# Patient Record
Sex: MALE | Race: WHITE | Employment: OTHER | URBAN - METROPOLITAN AREA
[De-identification: names, ages, dates, MRNs, and addresses within clinical notes are randomized per-mention and may not be internally consistent; named-entity substitution may affect disease eponyms.]

---

## 2017-03-08 ENCOUNTER — TELEPHONE (OUTPATIENT)
Dept: SURGERY | Age: 72
End: 2017-03-08

## 2017-03-13 ENCOUNTER — PAT TELEPHONE (OUTPATIENT)
Dept: PREADMISSION TESTING | Age: 72
End: 2017-03-13

## 2017-03-13 VITALS — HEIGHT: 73 IN | WEIGHT: 225 LBS | BODY MASS INDEX: 29.82 KG/M2

## 2017-03-15 ENCOUNTER — HOSPITAL ENCOUNTER (OUTPATIENT)
Dept: SURGERY | Age: 72
Discharge: HOME | End: 2017-03-15
Attending: SURGERY | Admitting: SURGERY

## 2017-03-15 VITALS
BODY MASS INDEX: 30.58 KG/M2 | OXYGEN SATURATION: 96 % | RESPIRATION RATE: 20 BRPM | TEMPERATURE: 97.6 F | DIASTOLIC BLOOD PRESSURE: 99 MMHG | HEART RATE: 71 BPM | HEIGHT: 73 IN | SYSTOLIC BLOOD PRESSURE: 154 MMHG | WEIGHT: 230.71 LBS

## 2017-03-15 LAB
ANION GAP SERPL CALCULATED.3IONS-SCNC: 12 MMOL/L (ref 3–16)
BASOPHILS ABSOLUTE: 0 K/UL (ref 0–0.2)
BASOPHILS RELATIVE PERCENT: 0.5 %
BUN BLDV-MCNC: 11 MG/DL (ref 7–20)
CALCIUM SERPL-MCNC: 9.7 MG/DL (ref 8.3–10.6)
CHLORIDE BLD-SCNC: 100 MMOL/L (ref 99–110)
CO2: 25 MMOL/L (ref 21–32)
CREAT SERPL-MCNC: 0.6 MG/DL (ref 0.8–1.3)
EOSINOPHILS ABSOLUTE: 0.2 K/UL (ref 0–0.6)
EOSINOPHILS RELATIVE PERCENT: 4 %
GFR AFRICAN AMERICAN: >60
GFR NON-AFRICAN AMERICAN: >60
GLUCOSE BLD-MCNC: 102 MG/DL (ref 70–99)
HCT VFR BLD CALC: 48 % (ref 40.5–52.5)
HEMOGLOBIN: 16.3 G/DL (ref 13.5–17.5)
LYMPHOCYTES ABSOLUTE: 1.2 K/UL (ref 1–5.1)
LYMPHOCYTES RELATIVE PERCENT: 22.6 %
MCH RBC QN AUTO: 33.5 PG (ref 26–34)
MCHC RBC AUTO-ENTMCNC: 33.9 G/DL (ref 31–36)
MCV RBC AUTO: 99 FL (ref 80–100)
MONOCYTES ABSOLUTE: 0.6 K/UL (ref 0–1.3)
MONOCYTES RELATIVE PERCENT: 10.5 %
NEUTROPHILS ABSOLUTE: 3.4 K/UL (ref 1.7–7.7)
NEUTROPHILS RELATIVE PERCENT: 62.4 %
PDW BLD-RTO: 14 % (ref 12.4–15.4)
PLATELET # BLD: 187 K/UL (ref 135–450)
PMV BLD AUTO: 8.7 FL (ref 5–10.5)
POTASSIUM SERPL-SCNC: 4.1 MMOL/L (ref 3.5–5.1)
RBC # BLD: 4.85 M/UL (ref 4.2–5.9)
SODIUM BLD-SCNC: 137 MMOL/L (ref 136–145)
WBC # BLD: 5.4 K/UL (ref 4–11)

## 2017-03-15 PROCEDURE — 49568 PR IMPLANT MESH HERNIA REPAIR/DEBRIDEMENT CLOSURE: CPT | Performed by: SURGERY

## 2017-03-15 PROCEDURE — 49560 PR REPAIR INCISIONAL HERNIA,REDUCIBLE: CPT | Performed by: SURGERY

## 2017-03-15 PROCEDURE — 93010 ELECTROCARDIOGRAM REPORT: CPT | Performed by: INTERNAL MEDICINE

## 2017-03-15 RX ORDER — OXYCODONE HYDROCHLORIDE 5 MG/1
5 TABLET ORAL EVERY 4 HOURS PRN
Qty: 30 TABLET | Refills: 0 | Status: SHIPPED | OUTPATIENT
Start: 2017-03-15 | End: 2017-03-30 | Stop reason: CLARIF

## 2017-03-15 RX ORDER — SODIUM CHLORIDE 0.9 % (FLUSH) 0.9 %
10 SYRINGE (ML) INJECTION PRN
Status: DISCONTINUED | OUTPATIENT
Start: 2017-03-15 | End: 2017-03-16 | Stop reason: HOSPADM

## 2017-03-15 RX ORDER — SODIUM CHLORIDE 9 MG/ML
INJECTION, SOLUTION INTRAVENOUS CONTINUOUS
Status: DISCONTINUED | OUTPATIENT
Start: 2017-03-15 | End: 2017-03-16 | Stop reason: HOSPADM

## 2017-03-15 RX ORDER — OXYCODONE HYDROCHLORIDE 5 MG/1
5 TABLET ORAL EVERY 4 HOURS PRN
Qty: 30 TABLET | Refills: 0 | Status: SHIPPED | OUTPATIENT
Start: 2017-03-15 | End: 2017-03-15

## 2017-03-15 RX ORDER — NAPROXEN 500 MG/1
500 TABLET ORAL 2 TIMES DAILY WITH MEALS
Qty: 30 TABLET | Refills: 0 | Status: SHIPPED | OUTPATIENT
Start: 2017-03-15 | End: 2017-03-15

## 2017-03-15 RX ORDER — OXYCODONE HYDROCHLORIDE 5 MG/1
5 TABLET ORAL
Status: COMPLETED | OUTPATIENT
Start: 2017-03-15 | End: 2017-03-15

## 2017-03-15 RX ORDER — NAPROXEN 500 MG/1
500 TABLET ORAL 2 TIMES DAILY WITH MEALS
Qty: 30 TABLET | Refills: 0 | Status: SHIPPED | OUTPATIENT
Start: 2017-03-15 | End: 2017-03-30 | Stop reason: CLARIF

## 2017-03-15 RX ORDER — SODIUM CHLORIDE 0.9 % (FLUSH) 0.9 %
10 SYRINGE (ML) INJECTION EVERY 12 HOURS SCHEDULED
Status: DISCONTINUED | OUTPATIENT
Start: 2017-03-15 | End: 2017-03-16 | Stop reason: HOSPADM

## 2017-03-15 RX ADMIN — OXYCODONE HYDROCHLORIDE 5 MG: 5 TABLET ORAL at 14:32

## 2017-03-15 ASSESSMENT — PAIN - FUNCTIONAL ASSESSMENT: PAIN_FUNCTIONAL_ASSESSMENT: 0-10

## 2017-03-15 ASSESSMENT — PAIN SCALES - GENERAL
PAINLEVEL_OUTOF10: 2
PAINLEVEL_OUTOF10: 0
PAINLEVEL_OUTOF10: 2
PAINLEVEL_OUTOF10: 3
PAINLEVEL_OUTOF10: 3

## 2017-03-15 ASSESSMENT — PAIN DESCRIPTION - PAIN TYPE
TYPE: SURGICAL PAIN

## 2017-03-15 ASSESSMENT — ENCOUNTER SYMPTOMS: SHORTNESS OF BREATH: 0

## 2017-03-16 LAB
EKG ATRIAL RATE: 78 BPM
EKG DIAGNOSIS: NORMAL
EKG P AXIS: 68 DEGREES
EKG P-R INTERVAL: 182 MS
EKG Q-T INTERVAL: 376 MS
EKG QRS DURATION: 88 MS
EKG QTC CALCULATION (BAZETT): 428 MS
EKG R AXIS: 11 DEGREES
EKG T AXIS: 59 DEGREES
EKG VENTRICULAR RATE: 78 BPM

## 2017-03-17 ENCOUNTER — TELEPHONE (OUTPATIENT)
Dept: SURGERY | Age: 72
End: 2017-03-17

## 2017-03-30 ENCOUNTER — OFFICE VISIT (OUTPATIENT)
Dept: SURGERY | Age: 72
End: 2017-03-30

## 2017-03-30 DIAGNOSIS — K43.2 INCISIONAL HERNIA, WITHOUT OBSTRUCTION OR GANGRENE: Primary | ICD-10-CM

## 2017-03-30 PROCEDURE — 99024 POSTOP FOLLOW-UP VISIT: CPT | Performed by: SURGERY

## 2017-04-10 ENCOUNTER — OFFICE VISIT (OUTPATIENT)
Dept: INTERNAL MEDICINE CLINIC | Age: 72
End: 2017-04-10

## 2017-04-10 VITALS
BODY MASS INDEX: 30.62 KG/M2 | DIASTOLIC BLOOD PRESSURE: 80 MMHG | WEIGHT: 231 LBS | HEART RATE: 85 BPM | RESPIRATION RATE: 16 BRPM | SYSTOLIC BLOOD PRESSURE: 132 MMHG | OXYGEN SATURATION: 94 % | HEIGHT: 73 IN

## 2017-04-10 DIAGNOSIS — R03.0 ELEVATED BP WITHOUT DIAGNOSIS OF HYPERTENSION: Primary | ICD-10-CM

## 2017-04-10 PROCEDURE — 99213 OFFICE O/P EST LOW 20 MIN: CPT | Performed by: NURSE PRACTITIONER

## 2017-04-10 PROCEDURE — 3017F COLORECTAL CA SCREEN DOC REV: CPT | Performed by: NURSE PRACTITIONER

## 2017-04-10 PROCEDURE — 4040F PNEUMOC VAC/ADMIN/RCVD: CPT | Performed by: NURSE PRACTITIONER

## 2017-04-10 PROCEDURE — G8427 DOCREV CUR MEDS BY ELIG CLIN: HCPCS | Performed by: NURSE PRACTITIONER

## 2017-04-10 PROCEDURE — G8417 CALC BMI ABV UP PARAM F/U: HCPCS | Performed by: NURSE PRACTITIONER

## 2017-04-10 PROCEDURE — 1036F TOBACCO NON-USER: CPT | Performed by: NURSE PRACTITIONER

## 2017-04-10 PROCEDURE — 1123F ACP DISCUSS/DSCN MKR DOCD: CPT | Performed by: NURSE PRACTITIONER

## 2017-04-10 RX ORDER — LORATADINE AND PSEUDOEPHEDRINE 10; 240 MG/1; MG/1
TABLET, EXTENDED RELEASE ORAL
Qty: 90 TABLET | Refills: 0 | OUTPATIENT
Start: 2017-04-10

## 2017-04-11 RX ORDER — LORATADINE AND PSEUDOEPHEDRINE 10; 240 MG/1; MG/1
TABLET, EXTENDED RELEASE ORAL
Qty: 90 TABLET | Refills: 0 | OUTPATIENT
Start: 2017-04-11

## 2017-04-14 RX ORDER — LORATADINE AND PSEUDOEPHEDRINE 10; 240 MG/1; MG/1
TABLET, EXTENDED RELEASE ORAL
Qty: 90 TABLET | Refills: 0 | OUTPATIENT
Start: 2017-04-14

## 2017-05-09 ENCOUNTER — OFFICE VISIT (OUTPATIENT)
Dept: FAMILY MEDICINE CLINIC | Age: 72
End: 2017-05-09

## 2017-05-09 VITALS
BODY MASS INDEX: 30.87 KG/M2 | RESPIRATION RATE: 12 BRPM | DIASTOLIC BLOOD PRESSURE: 90 MMHG | SYSTOLIC BLOOD PRESSURE: 146 MMHG | WEIGHT: 234 LBS | HEART RATE: 67 BPM

## 2017-05-09 DIAGNOSIS — I10 ESSENTIAL HYPERTENSION: ICD-10-CM

## 2017-05-09 DIAGNOSIS — I71.40 ABDOMINAL AORTIC ANEURYSM (AAA) WITHOUT RUPTURE: ICD-10-CM

## 2017-05-09 DIAGNOSIS — R73.03 PREDIABETES: ICD-10-CM

## 2017-05-09 DIAGNOSIS — E78.00 HYPERCHOLESTEREMIA: Primary | ICD-10-CM

## 2017-05-09 DIAGNOSIS — R73.9 HYPERGLYCEMIA: ICD-10-CM

## 2017-05-09 DIAGNOSIS — K21.9 GASTROESOPHAGEAL REFLUX DISEASE, ESOPHAGITIS PRESENCE NOT SPECIFIED: ICD-10-CM

## 2017-05-09 PROCEDURE — 1036F TOBACCO NON-USER: CPT | Performed by: NURSE PRACTITIONER

## 2017-05-09 PROCEDURE — 4040F PNEUMOC VAC/ADMIN/RCVD: CPT | Performed by: NURSE PRACTITIONER

## 2017-05-09 PROCEDURE — G8427 DOCREV CUR MEDS BY ELIG CLIN: HCPCS | Performed by: NURSE PRACTITIONER

## 2017-05-09 PROCEDURE — 3017F COLORECTAL CA SCREEN DOC REV: CPT | Performed by: NURSE PRACTITIONER

## 2017-05-09 PROCEDURE — G8417 CALC BMI ABV UP PARAM F/U: HCPCS | Performed by: NURSE PRACTITIONER

## 2017-05-09 PROCEDURE — 1123F ACP DISCUSS/DSCN MKR DOCD: CPT | Performed by: NURSE PRACTITIONER

## 2017-05-09 PROCEDURE — 99214 OFFICE O/P EST MOD 30 MIN: CPT | Performed by: NURSE PRACTITIONER

## 2017-05-09 RX ORDER — LISINOPRIL 10 MG/1
10 TABLET ORAL DAILY
Qty: 30 TABLET | Refills: 1 | Status: SHIPPED | OUTPATIENT
Start: 2017-05-09 | End: 2017-05-15 | Stop reason: SDUPTHER

## 2017-05-09 ASSESSMENT — ENCOUNTER SYMPTOMS
CONSTIPATION: 0
NAUSEA: 0
SHORTNESS OF BREATH: 0
DIARRHEA: 0
VOMITING: 0
CHEST TIGHTNESS: 0

## 2017-05-09 ASSESSMENT — PATIENT HEALTH QUESTIONNAIRE - PHQ9
SUM OF ALL RESPONSES TO PHQ QUESTIONS 1-9: 0
SUM OF ALL RESPONSES TO PHQ9 QUESTIONS 1 & 2: 0
1. LITTLE INTEREST OR PLEASURE IN DOING THINGS: 0
2. FEELING DOWN, DEPRESSED OR HOPELESS: 0

## 2017-05-11 DIAGNOSIS — R73.9 HYPERGLYCEMIA: ICD-10-CM

## 2017-05-11 DIAGNOSIS — E78.00 HYPERCHOLESTEREMIA: ICD-10-CM

## 2017-05-11 DIAGNOSIS — R73.03 PREDIABETES: ICD-10-CM

## 2017-05-11 LAB
A/G RATIO: 1.9 (ref 1.1–2.2)
ALBUMIN SERPL-MCNC: 4.4 G/DL (ref 3.4–5)
ALP BLD-CCNC: 73 U/L (ref 40–129)
ALT SERPL-CCNC: 21 U/L (ref 10–40)
ANION GAP SERPL CALCULATED.3IONS-SCNC: 14 MMOL/L (ref 3–16)
AST SERPL-CCNC: 22 U/L (ref 15–37)
BILIRUB SERPL-MCNC: 0.4 MG/DL (ref 0–1)
BUN BLDV-MCNC: 19 MG/DL (ref 7–20)
CALCIUM SERPL-MCNC: 9.6 MG/DL (ref 8.3–10.6)
CHLORIDE BLD-SCNC: 105 MMOL/L (ref 99–110)
CHOLESTEROL, TOTAL: 186 MG/DL (ref 0–199)
CO2: 25 MMOL/L (ref 21–32)
CREAT SERPL-MCNC: 0.8 MG/DL (ref 0.8–1.3)
GFR AFRICAN AMERICAN: >60
GFR NON-AFRICAN AMERICAN: >60
GLOBULIN: 2.3 G/DL
GLUCOSE BLD-MCNC: 97 MG/DL (ref 70–99)
HDLC SERPL-MCNC: 49 MG/DL (ref 40–60)
LDL CHOLESTEROL CALCULATED: 121 MG/DL
POTASSIUM SERPL-SCNC: 5 MMOL/L (ref 3.5–5.1)
SODIUM BLD-SCNC: 144 MMOL/L (ref 136–145)
TOTAL PROTEIN: 6.7 G/DL (ref 6.4–8.2)
TRIGL SERPL-MCNC: 80 MG/DL (ref 0–150)
VLDLC SERPL CALC-MCNC: 16 MG/DL

## 2017-05-12 LAB
ESTIMATED AVERAGE GLUCOSE: 116.9 MG/DL
HBA1C MFR BLD: 5.7 %

## 2017-05-15 DIAGNOSIS — I10 ESSENTIAL HYPERTENSION: ICD-10-CM

## 2017-05-15 RX ORDER — LISINOPRIL 10 MG/1
10 TABLET ORAL DAILY
Qty: 90 TABLET | Refills: 0 | Status: SHIPPED | OUTPATIENT
Start: 2017-05-15 | End: 2017-09-24 | Stop reason: SDUPTHER

## 2017-05-16 RX ORDER — ATORVASTATIN CALCIUM 80 MG/1
TABLET, FILM COATED ORAL
Qty: 90 TABLET | Refills: 1 | Status: SHIPPED | OUTPATIENT
Start: 2017-05-16 | End: 2017-11-09 | Stop reason: SDUPTHER

## 2017-06-06 ENCOUNTER — OFFICE VISIT (OUTPATIENT)
Dept: FAMILY MEDICINE CLINIC | Age: 72
End: 2017-06-06

## 2017-06-06 VITALS
DIASTOLIC BLOOD PRESSURE: 80 MMHG | HEART RATE: 68 BPM | WEIGHT: 229 LBS | RESPIRATION RATE: 12 BRPM | SYSTOLIC BLOOD PRESSURE: 130 MMHG | BODY MASS INDEX: 30.21 KG/M2

## 2017-06-06 DIAGNOSIS — K21.9 GASTROESOPHAGEAL REFLUX DISEASE, ESOPHAGITIS PRESENCE NOT SPECIFIED: ICD-10-CM

## 2017-06-06 DIAGNOSIS — I10 ESSENTIAL HYPERTENSION: ICD-10-CM

## 2017-06-06 DIAGNOSIS — I10 ESSENTIAL HYPERTENSION: Primary | ICD-10-CM

## 2017-06-06 LAB
ANION GAP SERPL CALCULATED.3IONS-SCNC: 12 MMOL/L (ref 3–16)
BUN BLDV-MCNC: 17 MG/DL (ref 7–20)
CALCIUM SERPL-MCNC: 9.4 MG/DL (ref 8.3–10.6)
CHLORIDE BLD-SCNC: 103 MMOL/L (ref 99–110)
CO2: 26 MMOL/L (ref 21–32)
CREAT SERPL-MCNC: 0.8 MG/DL (ref 0.8–1.3)
GFR AFRICAN AMERICAN: >60
GFR NON-AFRICAN AMERICAN: >60
GLUCOSE BLD-MCNC: 90 MG/DL (ref 70–99)
POTASSIUM SERPL-SCNC: 4.6 MMOL/L (ref 3.5–5.1)
SODIUM BLD-SCNC: 141 MMOL/L (ref 136–145)

## 2017-06-06 PROCEDURE — G8417 CALC BMI ABV UP PARAM F/U: HCPCS | Performed by: NURSE PRACTITIONER

## 2017-06-06 PROCEDURE — 99213 OFFICE O/P EST LOW 20 MIN: CPT | Performed by: NURSE PRACTITIONER

## 2017-06-06 PROCEDURE — 1036F TOBACCO NON-USER: CPT | Performed by: NURSE PRACTITIONER

## 2017-06-06 PROCEDURE — G8427 DOCREV CUR MEDS BY ELIG CLIN: HCPCS | Performed by: NURSE PRACTITIONER

## 2017-06-06 PROCEDURE — 1123F ACP DISCUSS/DSCN MKR DOCD: CPT | Performed by: NURSE PRACTITIONER

## 2017-06-06 PROCEDURE — 4040F PNEUMOC VAC/ADMIN/RCVD: CPT | Performed by: NURSE PRACTITIONER

## 2017-06-06 PROCEDURE — 3017F COLORECTAL CA SCREEN DOC REV: CPT | Performed by: NURSE PRACTITIONER

## 2017-06-06 ASSESSMENT — ENCOUNTER SYMPTOMS
SHORTNESS OF BREATH: 0
DIARRHEA: 0
VOMITING: 0
CONSTIPATION: 0
CHEST TIGHTNESS: 0
NAUSEA: 0

## 2017-08-25 ENCOUNTER — OFFICE VISIT (OUTPATIENT)
Dept: FAMILY MEDICINE CLINIC | Age: 72
End: 2017-08-25

## 2017-08-25 VITALS
SYSTOLIC BLOOD PRESSURE: 118 MMHG | HEART RATE: 71 BPM | RESPIRATION RATE: 12 BRPM | BODY MASS INDEX: 29.69 KG/M2 | DIASTOLIC BLOOD PRESSURE: 84 MMHG | WEIGHT: 225 LBS

## 2017-08-25 DIAGNOSIS — K21.9 GASTROESOPHAGEAL REFLUX DISEASE, ESOPHAGITIS PRESENCE NOT SPECIFIED: ICD-10-CM

## 2017-08-25 DIAGNOSIS — S01.112A EYEBROW LACERATION, LEFT, INITIAL ENCOUNTER: ICD-10-CM

## 2017-08-25 PROCEDURE — G8417 CALC BMI ABV UP PARAM F/U: HCPCS | Performed by: NURSE PRACTITIONER

## 2017-08-25 PROCEDURE — G8427 DOCREV CUR MEDS BY ELIG CLIN: HCPCS | Performed by: NURSE PRACTITIONER

## 2017-08-25 PROCEDURE — 1123F ACP DISCUSS/DSCN MKR DOCD: CPT | Performed by: NURSE PRACTITIONER

## 2017-08-25 PROCEDURE — 4040F PNEUMOC VAC/ADMIN/RCVD: CPT | Performed by: NURSE PRACTITIONER

## 2017-08-25 PROCEDURE — 99213 OFFICE O/P EST LOW 20 MIN: CPT | Performed by: NURSE PRACTITIONER

## 2017-08-25 PROCEDURE — 1036F TOBACCO NON-USER: CPT | Performed by: NURSE PRACTITIONER

## 2017-08-25 PROCEDURE — 3017F COLORECTAL CA SCREEN DOC REV: CPT | Performed by: NURSE PRACTITIONER

## 2017-08-25 RX ORDER — ESOMEPRAZOLE MAGNESIUM 40 MG/1
CAPSULE, DELAYED RELEASE ORAL
Qty: 90 CAPSULE | Refills: 1 | Status: SHIPPED | OUTPATIENT
Start: 2017-08-25 | End: 2018-02-09 | Stop reason: SDUPTHER

## 2017-08-26 ASSESSMENT — ENCOUNTER SYMPTOMS
SHORTNESS OF BREATH: 0
CONSTIPATION: 0
DIARRHEA: 0
CHEST TIGHTNESS: 0
NAUSEA: 0
VOMITING: 0

## 2017-08-28 RX ORDER — LORATADINE AND PSEUDOEPHEDRINE 10; 240 MG/1; MG/1
TABLET, EXTENDED RELEASE ORAL
Qty: 90 TABLET | Refills: 0 | Status: SHIPPED | OUTPATIENT
Start: 2017-08-28 | End: 2017-11-06 | Stop reason: ALTCHOICE

## 2017-09-24 DIAGNOSIS — I10 ESSENTIAL HYPERTENSION: ICD-10-CM

## 2017-09-25 RX ORDER — LISINOPRIL 10 MG/1
TABLET ORAL
Qty: 90 TABLET | Refills: 0 | Status: SHIPPED | OUTPATIENT
Start: 2017-09-25 | End: 2017-11-06 | Stop reason: ALTCHOICE

## 2017-11-06 ENCOUNTER — OFFICE VISIT (OUTPATIENT)
Dept: FAMILY MEDICINE CLINIC | Age: 72
End: 2017-11-06

## 2017-11-06 VITALS
BODY MASS INDEX: 30.62 KG/M2 | HEART RATE: 74 BPM | WEIGHT: 231 LBS | DIASTOLIC BLOOD PRESSURE: 72 MMHG | SYSTOLIC BLOOD PRESSURE: 128 MMHG | OXYGEN SATURATION: 96 % | HEIGHT: 73 IN | TEMPERATURE: 98.3 F

## 2017-11-06 DIAGNOSIS — K21.9 GASTROESOPHAGEAL REFLUX DISEASE, ESOPHAGITIS PRESENCE NOT SPECIFIED: ICD-10-CM

## 2017-11-06 DIAGNOSIS — Z23 NEED FOR INFLUENZA VACCINATION: ICD-10-CM

## 2017-11-06 DIAGNOSIS — E78.00 HYPERCHOLESTEREMIA: Primary | ICD-10-CM

## 2017-11-06 DIAGNOSIS — R73.9 HYPERGLYCEMIA: ICD-10-CM

## 2017-11-06 DIAGNOSIS — R73.03 PREDIABETES: ICD-10-CM

## 2017-11-06 DIAGNOSIS — I71.40 ABDOMINAL AORTIC ANEURYSM (AAA) WITHOUT RUPTURE: ICD-10-CM

## 2017-11-06 DIAGNOSIS — J30.89 CHRONIC NON-SEASONAL ALLERGIC RHINITIS, UNSPECIFIED TRIGGER: ICD-10-CM

## 2017-11-06 DIAGNOSIS — I10 ESSENTIAL HYPERTENSION: ICD-10-CM

## 2017-11-06 PROCEDURE — G8427 DOCREV CUR MEDS BY ELIG CLIN: HCPCS | Performed by: NURSE PRACTITIONER

## 2017-11-06 PROCEDURE — G8417 CALC BMI ABV UP PARAM F/U: HCPCS | Performed by: NURSE PRACTITIONER

## 2017-11-06 PROCEDURE — 99214 OFFICE O/P EST MOD 30 MIN: CPT | Performed by: NURSE PRACTITIONER

## 2017-11-06 PROCEDURE — 1123F ACP DISCUSS/DSCN MKR DOCD: CPT | Performed by: NURSE PRACTITIONER

## 2017-11-06 PROCEDURE — G8484 FLU IMMUNIZE NO ADMIN: HCPCS | Performed by: NURSE PRACTITIONER

## 2017-11-06 PROCEDURE — G0008 ADMIN INFLUENZA VIRUS VAC: HCPCS | Performed by: NURSE PRACTITIONER

## 2017-11-06 PROCEDURE — 3017F COLORECTAL CA SCREEN DOC REV: CPT | Performed by: NURSE PRACTITIONER

## 2017-11-06 PROCEDURE — 4040F PNEUMOC VAC/ADMIN/RCVD: CPT | Performed by: NURSE PRACTITIONER

## 2017-11-06 PROCEDURE — 1036F TOBACCO NON-USER: CPT | Performed by: NURSE PRACTITIONER

## 2017-11-06 PROCEDURE — 90662 IIV NO PRSV INCREASED AG IM: CPT | Performed by: NURSE PRACTITIONER

## 2017-11-06 RX ORDER — LOSARTAN POTASSIUM 50 MG/1
50 TABLET ORAL DAILY
Qty: 30 TABLET | Refills: 1 | Status: SHIPPED | OUTPATIENT
Start: 2017-11-06 | End: 2017-12-04 | Stop reason: SDUPTHER

## 2017-11-06 RX ORDER — CETIRIZINE HYDROCHLORIDE, PSEUDOEPHEDRINE HYDROCHLORIDE 5; 120 MG/1; MG/1
1 TABLET, FILM COATED, EXTENDED RELEASE ORAL 2 TIMES DAILY
COMMUNITY

## 2017-11-06 ASSESSMENT — ENCOUNTER SYMPTOMS
COUGH: 1
VOMITING: 0
DIARRHEA: 0
SHORTNESS OF BREATH: 0
NAUSEA: 0
CHEST TIGHTNESS: 0
CONSTIPATION: 0

## 2017-11-06 NOTE — PROGRESS NOTES
11/6/2017    This is a 80 y.o. male   Chief Complaint   Patient presents with    6 Month Follow-Up    Cough   . HPI  Mark Rodríguez is a 80 y.o. male here to f/u on HTN, HLD, prediabetes, aaa, gerd, chuckie. Patient with  known GERD disease. Patient with no overt symptoms to include belching, heart burn, water brash,odyophagia, dysphasia, or epigastric pain while on daily PPI medication. Tried to decrease PPI dose and cough increased. AAA--ultrasound done in Oct 2016 --no change --no sx  Pt. with history of hypercholesterolemia. Pt. Claims compliance with treatments recommended to include low cholesterol diet, and exercise. CHUCKIE--daily flonase and zyrtec-d   Glaucoma--seeing eye dr for this--borderline pressures per pt  [place] polyps--done 3/16  ED--stable on cialis prn--gets from urology  Prostate ca-s/p prostatectomy-seeing dr Elmer Arambula recently per pt    Positive for dry cough. Does have PND. Thought cough was r/t increased reflux, but now back on higher dose of nexium, cough continues. Hyperlipidemia:  No new myalgias or GI upset on atorvastatin (Lipitor).        Lab Results   Component Value Date    LABA1C 5.7 05/11/2017    LABA1C 5.7 10/04/2016    LABA1C 5.4 11/09/2015     Lab Results   Component Value Date    LABMICR Not Indicated 07/09/2015    CREATININE 0.8 06/06/2017     Lab Results   Component Value Date    ALT 21 05/11/2017    AST 22 05/11/2017     Lab Results   Component Value Date    CHOL 186 05/11/2017    TRIG 80 05/11/2017    HDL 49 05/11/2017    LDLCALC 121 (H) 05/11/2017           Patient Active Problem List   Diagnosis    Adenocarcinoma of prostate-s/p total prostatectomy--sees dr Karla Rebolledo of left eye    AAA (abdominal aortic aneurysm)-(3.2 cm- lunchanged--done 10/16)--repeat 1 yr    Diverticula, colon    Allergic rhinitis    Glaucoma-sees dr Aris Gibbons BPH w/o urinary obs/LUTS--s/p prostatectomy    Hypercholesteremia    GERD (gastroesophageal reflux disease)--on daily ppi    Colonic polyp-(last colo 3/16--pos-repeat 3-5yrs)-dr mccarty    ED (erectile dysfunction)--started on daily cialis by dr Obdulio Solano    Incisional hernia, without obstruction or gangrene    Prediabetes    Essential hypertension       Current Outpatient Prescriptions   Medication Sig Dispense Refill    Cetirizine HCl (ZYRTEC ALLERGY PO) Take by mouth      lisinopril (PRINIVIL;ZESTRIL) 10 MG tablet TAKE 1 TABLET BY MOUTH DAILY FOR HIGH BLOOD PRESSURE. 90 tablet 0    esomeprazole (NEXIUM) 40 MG delayed release capsule Take 1 capsule by mouth  every morning 90 capsule 1    atorvastatin (LIPITOR) 80 MG tablet Take 1 tablet by mouth  daily 90 tablet 1    polyethylene glycol (GLYCOLAX) powder Take 17 g by mouth daily      tadalafil (CIALIS) 20 MG tablet Take 20 mg by mouth as needed for Erectile Dysfunction      hydrocortisone 1 % cream Apply topically as needed Apply topically 2 times daily.  fluticasone (FLONASE) 50 MCG/ACT nasal spray 1 spray by Nasal route daily. (Patient taking differently: 1 spray by Nasal route as needed ) 3 Bottle 3    aspirin EC 81 MG EC tablet Take 81 mg by mouth daily.  brimonidine-timolol (COMBIGAN) 0.2-0.5 % ophthalmic solution Place 1 drop into the right eye every 12 hours.  loratadine-pseudoephedrine (CLARITIN-D 24HR)  MG per extended release tablet TAKE ONE TABLET BY MOUTH ONCE DAILY 90 tablet 0     No current facility-administered medications for this visit. No Known Allergies    Review of Systems   Constitutional: Negative for activity change and fatigue. Respiratory: Positive for cough. Negative for chest tightness and shortness of breath. Cardiovascular: Negative for chest pain. Gastrointestinal: Negative for constipation, diarrhea, nausea and vomiting. Neurological: Negative for dizziness, syncope, weakness and headaches. Psychiatric/Behavioral: Negative for confusion.        Vitals:    11/06/17 0957   BP: 128/72   Pulse: 74   Temp: 98.3 °F (36.8 °C)   SpO2: 96%   Weight: 231 lb (104.8 kg)   Height: 6' 1\" (1.854 m)       Body mass index is 30.48 kg/m². Wt Readings from Last 3 Encounters:   11/06/17 231 lb (104.8 kg)   08/25/17 225 lb (102.1 kg)   08/13/17 227 lb 8.2 oz (103.2 kg)       BP Readings from Last 3 Encounters:   11/06/17 128/72   08/25/17 118/84   08/13/17 (!) 146/91       Physical Exam   Constitutional: He is oriented to person, place, and time. He appears well-developed and well-nourished. No distress. HENT:   Head: Normocephalic and atraumatic. Eyes: EOM are normal.   Neck: Neck supple. Carotid bruit is not present. Cardiovascular: Normal rate, regular rhythm and normal heart sounds. Exam reveals no gallop and no friction rub. No murmur heard. Pulmonary/Chest: Effort normal and breath sounds normal. No respiratory distress. Musculoskeletal: He exhibits no edema. Right ankle: He exhibits normal pulse. Left ankle: He exhibits normal pulse. Neurological: He is alert and oriented to person, place, and time. Skin: Skin is warm and dry. Psychiatric: He has a normal mood and affect. His behavior is normal. Judgment and thought content normal.   Nursing note and vitals reviewed. Assessment and Plan  Rufina Tompkins was seen today for check-up. Diagnoses and all orders for this visit:    Hypercholesteremia  -     Comprehensive Metabolic Panel; Future  -     Lipid Panel; Future  On atorvastatin 80 mg daily. Diet and weight loss discussed in needed. Prediabetes/ Hyperglycemia  -     Hemoglobin A1C; Future  Low-carb diet, aerobic exercise, and weight loss discussed in needed. Gastroesophageal reflux disease, esophagitis presence not specified: Stable on Nexium 40 mg daily. Tried to decrease dose in the past, and was not able to tolerate. Abdominal aortic aneurysm (AAA) without rupture (HCC)  Asymptomatic. Recheck ultrasound.      Essential hypertension: controlled on lisinopril 10 mg daily, but believe cough is r/t medication. Will d/c lisinopril and start losartan 50 mg daily. Side effects discussed. Low-salt diet, aerobic exercise, and weight loss discussed in needed. Need for influenza vaccination  -     INFLUENZA, HIGH DOSE, 65 YRS +, IM, PF, PREFILL SYR, 0.5ML (FLUZONE HD)    Chronic non-seasonal allergic rhinitis, unspecified trigger  Stable. Continue flonase and zyrtec. Return in about 4 weeks (around 12/4/2017), or if symptoms worsen or fail to improve, for blood pressure and cough .

## 2017-11-09 RX ORDER — ATORVASTATIN CALCIUM 80 MG/1
TABLET, FILM COATED ORAL
Qty: 90 TABLET | Refills: 1 | Status: SHIPPED | OUTPATIENT
Start: 2017-11-09 | End: 2018-05-07 | Stop reason: SDUPTHER

## 2017-11-14 ENCOUNTER — HOSPITAL ENCOUNTER (OUTPATIENT)
Dept: ULTRASOUND IMAGING | Age: 72
Discharge: HOME | End: 2017-11-14
Attending: NURSE PRACTITIONER | Admitting: NURSE PRACTITIONER

## 2017-11-14 DIAGNOSIS — E78.00 HYPERCHOLESTEREMIA: ICD-10-CM

## 2017-11-14 DIAGNOSIS — R73.03 PREDIABETES: ICD-10-CM

## 2017-11-14 DIAGNOSIS — I10 ESSENTIAL HYPERTENSION: ICD-10-CM

## 2017-11-14 DIAGNOSIS — I71.40 ABDOMINAL AORTIC ANEURYSM (AAA) WITHOUT RUPTURE: ICD-10-CM

## 2017-11-14 DIAGNOSIS — I71.40 ABDOMINAL ANEURYSM: ICD-10-CM

## 2017-11-14 DIAGNOSIS — R73.9 HYPERGLYCEMIA: ICD-10-CM

## 2017-11-14 DIAGNOSIS — K21.9 GASTROESOPHAGEAL REFLUX DISEASE, ESOPHAGITIS PRESENCE NOT SPECIFIED: ICD-10-CM

## 2017-11-14 LAB
A/G RATIO: 1.7 (ref 1.1–2.2)
ALBUMIN SERPL-MCNC: 4.3 G/DL (ref 3.4–5)
ALP BLD-CCNC: 79 U/L (ref 40–129)
ALT SERPL-CCNC: 26 U/L (ref 10–40)
ANION GAP SERPL CALCULATED.3IONS-SCNC: 11 MMOL/L (ref 3–16)
AST SERPL-CCNC: 29 U/L (ref 15–37)
BILIRUB SERPL-MCNC: 0.4 MG/DL (ref 0–1)
BUN BLDV-MCNC: 15 MG/DL (ref 7–20)
CALCIUM SERPL-MCNC: 9.9 MG/DL (ref 8.3–10.6)
CHLORIDE BLD-SCNC: 101 MMOL/L (ref 99–110)
CHOLESTEROL, TOTAL: 196 MG/DL (ref 0–199)
CO2: 29 MMOL/L (ref 21–32)
CREAT SERPL-MCNC: 0.7 MG/DL (ref 0.8–1.3)
GFR AFRICAN AMERICAN: >60
GFR NON-AFRICAN AMERICAN: >60
GLOBULIN: 2.6 G/DL
GLUCOSE BLD-MCNC: 88 MG/DL (ref 70–99)
HDLC SERPL-MCNC: 60 MG/DL (ref 40–60)
LDL CHOLESTEROL CALCULATED: 118 MG/DL
MAGNESIUM: 2.3 MG/DL (ref 1.8–2.4)
POTASSIUM SERPL-SCNC: 4.7 MMOL/L (ref 3.5–5.1)
SODIUM BLD-SCNC: 141 MMOL/L (ref 136–145)
TOTAL PROTEIN: 6.9 G/DL (ref 6.4–8.2)
TRIGL SERPL-MCNC: 89 MG/DL (ref 0–150)
VLDLC SERPL CALC-MCNC: 18 MG/DL

## 2017-11-15 LAB
ESTIMATED AVERAGE GLUCOSE: 122.6 MG/DL
HBA1C MFR BLD: 5.9 %

## 2017-12-04 ENCOUNTER — OFFICE VISIT (OUTPATIENT)
Dept: FAMILY MEDICINE CLINIC | Age: 72
End: 2017-12-04

## 2017-12-04 VITALS
BODY MASS INDEX: 30.87 KG/M2 | DIASTOLIC BLOOD PRESSURE: 72 MMHG | WEIGHT: 234 LBS | SYSTOLIC BLOOD PRESSURE: 122 MMHG | RESPIRATION RATE: 12 BRPM | HEART RATE: 73 BPM

## 2017-12-04 DIAGNOSIS — I10 ESSENTIAL HYPERTENSION: ICD-10-CM

## 2017-12-04 PROCEDURE — G8427 DOCREV CUR MEDS BY ELIG CLIN: HCPCS | Performed by: NURSE PRACTITIONER

## 2017-12-04 PROCEDURE — G8484 FLU IMMUNIZE NO ADMIN: HCPCS | Performed by: NURSE PRACTITIONER

## 2017-12-04 PROCEDURE — 1036F TOBACCO NON-USER: CPT | Performed by: NURSE PRACTITIONER

## 2017-12-04 PROCEDURE — G8417 CALC BMI ABV UP PARAM F/U: HCPCS | Performed by: NURSE PRACTITIONER

## 2017-12-04 PROCEDURE — 1123F ACP DISCUSS/DSCN MKR DOCD: CPT | Performed by: NURSE PRACTITIONER

## 2017-12-04 PROCEDURE — 3017F COLORECTAL CA SCREEN DOC REV: CPT | Performed by: NURSE PRACTITIONER

## 2017-12-04 PROCEDURE — 4040F PNEUMOC VAC/ADMIN/RCVD: CPT | Performed by: NURSE PRACTITIONER

## 2017-12-04 PROCEDURE — 99213 OFFICE O/P EST LOW 20 MIN: CPT | Performed by: NURSE PRACTITIONER

## 2017-12-04 RX ORDER — LOSARTAN POTASSIUM 50 MG/1
50 TABLET ORAL DAILY
Qty: 90 TABLET | Refills: 1 | Status: SHIPPED | OUTPATIENT
Start: 2017-12-04 | End: 2017-12-15 | Stop reason: SDUPTHER

## 2017-12-04 ASSESSMENT — ENCOUNTER SYMPTOMS
CONSTIPATION: 0
DIARRHEA: 0
NAUSEA: 0
SHORTNESS OF BREATH: 0
CHEST TIGHTNESS: 0
VOMITING: 0

## 2017-12-15 DIAGNOSIS — I10 ESSENTIAL HYPERTENSION: ICD-10-CM

## 2017-12-15 RX ORDER — LOSARTAN POTASSIUM 50 MG/1
50 TABLET ORAL DAILY
Qty: 30 TABLET | Refills: 1 | Status: SHIPPED | OUTPATIENT
Start: 2017-12-15 | End: 2017-12-28

## 2017-12-27 DIAGNOSIS — I10 ESSENTIAL HYPERTENSION: ICD-10-CM

## 2017-12-27 RX ORDER — LISINOPRIL 10 MG/1
TABLET ORAL
Qty: 90 TABLET | Refills: 0 | Status: SHIPPED | OUTPATIENT
Start: 2017-12-27 | End: 2017-12-28

## 2017-12-28 DIAGNOSIS — I10 ESSENTIAL HYPERTENSION: ICD-10-CM

## 2017-12-28 RX ORDER — LOSARTAN POTASSIUM 50 MG/1
50 TABLET ORAL DAILY
Qty: 90 TABLET | Refills: 1 | Status: SHIPPED | OUTPATIENT
Start: 2017-12-28 | End: 2018-05-07 | Stop reason: SDUPTHER

## 2018-02-09 DIAGNOSIS — K21.9 GASTROESOPHAGEAL REFLUX DISEASE, ESOPHAGITIS PRESENCE NOT SPECIFIED: ICD-10-CM

## 2018-02-09 RX ORDER — ESOMEPRAZOLE MAGNESIUM 40 MG/1
CAPSULE, DELAYED RELEASE ORAL
Qty: 90 CAPSULE | Refills: 1 | Status: SHIPPED | OUTPATIENT
Start: 2018-02-09 | End: 2018-05-07 | Stop reason: SDUPTHER

## 2018-05-07 ENCOUNTER — OFFICE VISIT (OUTPATIENT)
Dept: FAMILY MEDICINE CLINIC | Age: 73
End: 2018-05-07

## 2018-05-07 ENCOUNTER — TELEPHONE (OUTPATIENT)
Dept: FAMILY MEDICINE CLINIC | Age: 73
End: 2018-05-07

## 2018-05-07 VITALS
DIASTOLIC BLOOD PRESSURE: 80 MMHG | WEIGHT: 237 LBS | SYSTOLIC BLOOD PRESSURE: 124 MMHG | RESPIRATION RATE: 12 BRPM | HEART RATE: 50 BPM | BODY MASS INDEX: 31.27 KG/M2

## 2018-05-07 DIAGNOSIS — E78.00 HYPERCHOLESTEREMIA: ICD-10-CM

## 2018-05-07 DIAGNOSIS — I71.40 ABDOMINAL AORTIC ANEURYSM (AAA) WITHOUT RUPTURE: ICD-10-CM

## 2018-05-07 DIAGNOSIS — J30.89 CHRONIC NON-SEASONAL ALLERGIC RHINITIS, UNSPECIFIED TRIGGER: ICD-10-CM

## 2018-05-07 DIAGNOSIS — R73.03 PREDIABETES: ICD-10-CM

## 2018-05-07 DIAGNOSIS — I10 ESSENTIAL HYPERTENSION: ICD-10-CM

## 2018-05-07 DIAGNOSIS — K21.9 GASTROESOPHAGEAL REFLUX DISEASE, ESOPHAGITIS PRESENCE NOT SPECIFIED: ICD-10-CM

## 2018-05-07 PROCEDURE — 1036F TOBACCO NON-USER: CPT | Performed by: NURSE PRACTITIONER

## 2018-05-07 PROCEDURE — G8417 CALC BMI ABV UP PARAM F/U: HCPCS | Performed by: NURSE PRACTITIONER

## 2018-05-07 PROCEDURE — 3017F COLORECTAL CA SCREEN DOC REV: CPT | Performed by: NURSE PRACTITIONER

## 2018-05-07 PROCEDURE — 4040F PNEUMOC VAC/ADMIN/RCVD: CPT | Performed by: NURSE PRACTITIONER

## 2018-05-07 PROCEDURE — 99214 OFFICE O/P EST MOD 30 MIN: CPT | Performed by: NURSE PRACTITIONER

## 2018-05-07 PROCEDURE — 1123F ACP DISCUSS/DSCN MKR DOCD: CPT | Performed by: NURSE PRACTITIONER

## 2018-05-07 PROCEDURE — G8427 DOCREV CUR MEDS BY ELIG CLIN: HCPCS | Performed by: NURSE PRACTITIONER

## 2018-05-07 RX ORDER — LOSARTAN POTASSIUM 50 MG/1
50 TABLET ORAL DAILY
Qty: 90 TABLET | Refills: 2 | Status: SHIPPED | OUTPATIENT
Start: 2018-05-07 | End: 2019-02-21 | Stop reason: SDUPTHER

## 2018-05-07 RX ORDER — ATORVASTATIN CALCIUM 80 MG/1
TABLET, FILM COATED ORAL
Qty: 90 TABLET | Refills: 2 | Status: SHIPPED | OUTPATIENT
Start: 2018-05-07 | End: 2018-07-25 | Stop reason: SDUPTHER

## 2018-05-07 ASSESSMENT — ENCOUNTER SYMPTOMS
SHORTNESS OF BREATH: 0
CHEST TIGHTNESS: 0
COUGH: 0
NAUSEA: 0
VOMITING: 0
DIARRHEA: 0
CONSTIPATION: 0

## 2018-05-08 DIAGNOSIS — R73.03 PREDIABETES: ICD-10-CM

## 2018-05-08 DIAGNOSIS — E78.00 HYPERCHOLESTEREMIA: ICD-10-CM

## 2018-05-08 DIAGNOSIS — I10 ESSENTIAL HYPERTENSION: ICD-10-CM

## 2018-05-08 LAB
A/G RATIO: 1.5 (ref 1.1–2.2)
ALBUMIN SERPL-MCNC: 4.3 G/DL (ref 3.4–5)
ALP BLD-CCNC: 69 U/L (ref 40–129)
ALT SERPL-CCNC: 23 U/L (ref 10–40)
ANION GAP SERPL CALCULATED.3IONS-SCNC: 13 MMOL/L (ref 3–16)
AST SERPL-CCNC: 26 U/L (ref 15–37)
BILIRUB SERPL-MCNC: 0.8 MG/DL (ref 0–1)
BUN BLDV-MCNC: 17 MG/DL (ref 7–20)
CALCIUM SERPL-MCNC: 9.5 MG/DL (ref 8.3–10.6)
CHLORIDE BLD-SCNC: 102 MMOL/L (ref 99–110)
CHOLESTEROL, TOTAL: 195 MG/DL (ref 0–199)
CO2: 26 MMOL/L (ref 21–32)
CREAT SERPL-MCNC: 0.7 MG/DL (ref 0.8–1.3)
ESTIMATED AVERAGE GLUCOSE: 108.3 MG/DL
GFR AFRICAN AMERICAN: >60
GFR NON-AFRICAN AMERICAN: >60
GLOBULIN: 2.8 G/DL
GLUCOSE BLD-MCNC: 97 MG/DL (ref 70–99)
HBA1C MFR BLD: 5.4 %
HDLC SERPL-MCNC: 51 MG/DL (ref 40–60)
LDL CHOLESTEROL CALCULATED: 128 MG/DL
POTASSIUM SERPL-SCNC: 4 MMOL/L (ref 3.5–5.1)
SODIUM BLD-SCNC: 141 MMOL/L (ref 136–145)
TOTAL PROTEIN: 7.1 G/DL (ref 6.4–8.2)
TRIGL SERPL-MCNC: 82 MG/DL (ref 0–150)
VLDLC SERPL CALC-MCNC: 16 MG/DL

## 2018-07-25 RX ORDER — ATORVASTATIN CALCIUM 80 MG/1
TABLET, FILM COATED ORAL
Qty: 90 TABLET | Refills: 1 | Status: SHIPPED | OUTPATIENT
Start: 2018-07-25 | End: 2019-10-01 | Stop reason: SDUPTHER

## 2018-11-08 ENCOUNTER — HOSPITAL ENCOUNTER (OUTPATIENT)
Dept: ULTRASOUND IMAGING | Age: 73
Discharge: HOME | End: 2018-11-08
Payer: MEDICARE

## 2018-11-08 ENCOUNTER — HOSPITAL ENCOUNTER (OUTPATIENT)
Dept: VASCULAR LAB | Age: 73
Discharge: HOME | End: 2018-11-08
Payer: MEDICARE

## 2018-11-08 DIAGNOSIS — I71.40 ABDOMINAL AORTIC ANEURYSM WITHOUT RUPTURE: ICD-10-CM

## 2018-11-08 DIAGNOSIS — I71.40 ABDOMINAL AORTIC ANEURYSM (AAA) WITHOUT RUPTURE: ICD-10-CM

## 2018-11-08 PROCEDURE — 76775 US EXAM ABDO BACK WALL LIM: CPT

## 2018-12-11 ENCOUNTER — TELEPHONE (OUTPATIENT)
Dept: FAMILY MEDICINE CLINIC | Age: 73
End: 2018-12-11

## 2018-12-17 ENCOUNTER — OFFICE VISIT (OUTPATIENT)
Dept: FAMILY MEDICINE CLINIC | Age: 73
End: 2018-12-17
Payer: MEDICARE

## 2018-12-17 VITALS
DIASTOLIC BLOOD PRESSURE: 70 MMHG | BODY MASS INDEX: 31 KG/M2 | RESPIRATION RATE: 18 BRPM | WEIGHT: 235 LBS | SYSTOLIC BLOOD PRESSURE: 110 MMHG | HEART RATE: 68 BPM

## 2018-12-17 DIAGNOSIS — I10 ESSENTIAL HYPERTENSION: ICD-10-CM

## 2018-12-17 DIAGNOSIS — I71.40 ABDOMINAL AORTIC ANEURYSM (AAA) WITHOUT RUPTURE: ICD-10-CM

## 2018-12-17 DIAGNOSIS — J30.89 CHRONIC NON-SEASONAL ALLERGIC RHINITIS: ICD-10-CM

## 2018-12-17 DIAGNOSIS — R73.03 PREDIABETES: ICD-10-CM

## 2018-12-17 DIAGNOSIS — K21.9 GASTROESOPHAGEAL REFLUX DISEASE, ESOPHAGITIS PRESENCE NOT SPECIFIED: ICD-10-CM

## 2018-12-17 DIAGNOSIS — E78.00 HYPERCHOLESTEREMIA: Primary | ICD-10-CM

## 2018-12-17 PROCEDURE — 1101F PT FALLS ASSESS-DOCD LE1/YR: CPT | Performed by: NURSE PRACTITIONER

## 2018-12-17 PROCEDURE — 4040F PNEUMOC VAC/ADMIN/RCVD: CPT | Performed by: NURSE PRACTITIONER

## 2018-12-17 PROCEDURE — G8427 DOCREV CUR MEDS BY ELIG CLIN: HCPCS | Performed by: NURSE PRACTITIONER

## 2018-12-17 PROCEDURE — G8417 CALC BMI ABV UP PARAM F/U: HCPCS | Performed by: NURSE PRACTITIONER

## 2018-12-17 PROCEDURE — 1123F ACP DISCUSS/DSCN MKR DOCD: CPT | Performed by: NURSE PRACTITIONER

## 2018-12-17 PROCEDURE — 1036F TOBACCO NON-USER: CPT | Performed by: NURSE PRACTITIONER

## 2018-12-17 PROCEDURE — 3017F COLORECTAL CA SCREEN DOC REV: CPT | Performed by: NURSE PRACTITIONER

## 2018-12-17 PROCEDURE — G8482 FLU IMMUNIZE ORDER/ADMIN: HCPCS | Performed by: NURSE PRACTITIONER

## 2018-12-17 PROCEDURE — 99214 OFFICE O/P EST MOD 30 MIN: CPT | Performed by: NURSE PRACTITIONER

## 2018-12-17 ASSESSMENT — PATIENT HEALTH QUESTIONNAIRE - PHQ9
1. LITTLE INTEREST OR PLEASURE IN DOING THINGS: 0
2. FEELING DOWN, DEPRESSED OR HOPELESS: 0
SUM OF ALL RESPONSES TO PHQ QUESTIONS 1-9: 0
SUM OF ALL RESPONSES TO PHQ9 QUESTIONS 1 & 2: 0
SUM OF ALL RESPONSES TO PHQ QUESTIONS 1-9: 0

## 2018-12-17 ASSESSMENT — ENCOUNTER SYMPTOMS
CONSTIPATION: 0
VOMITING: 0
CHEST TIGHTNESS: 0
NAUSEA: 0
DIARRHEA: 0
SHORTNESS OF BREATH: 0
COUGH: 0

## 2018-12-19 DIAGNOSIS — I10 ESSENTIAL HYPERTENSION: ICD-10-CM

## 2018-12-19 DIAGNOSIS — E78.00 HYPERCHOLESTEREMIA: ICD-10-CM

## 2018-12-19 DIAGNOSIS — R73.03 PREDIABETES: ICD-10-CM

## 2018-12-19 LAB
A/G RATIO: 1.7 (ref 1.1–2.2)
ALBUMIN SERPL-MCNC: 4.4 G/DL (ref 3.4–5)
ALP BLD-CCNC: 77 U/L (ref 40–129)
ALT SERPL-CCNC: 26 U/L (ref 10–40)
ANION GAP SERPL CALCULATED.3IONS-SCNC: 15 MMOL/L (ref 3–16)
AST SERPL-CCNC: 27 U/L (ref 15–37)
BILIRUB SERPL-MCNC: 0.3 MG/DL (ref 0–1)
BUN BLDV-MCNC: 17 MG/DL (ref 7–20)
CALCIUM SERPL-MCNC: 9.5 MG/DL (ref 8.3–10.6)
CHLORIDE BLD-SCNC: 101 MMOL/L (ref 99–110)
CHOLESTEROL, TOTAL: 200 MG/DL (ref 0–199)
CO2: 25 MMOL/L (ref 21–32)
CREAT SERPL-MCNC: 0.8 MG/DL (ref 0.8–1.3)
ESTIMATED AVERAGE GLUCOSE: 114 MG/DL
GFR AFRICAN AMERICAN: >60
GFR NON-AFRICAN AMERICAN: >60
GLOBULIN: 2.6 G/DL
GLUCOSE BLD-MCNC: 89 MG/DL (ref 70–99)
HBA1C MFR BLD: 5.6 %
HDLC SERPL-MCNC: 55 MG/DL (ref 40–60)
LDL CHOLESTEROL CALCULATED: 125 MG/DL
POTASSIUM SERPL-SCNC: 4.5 MMOL/L (ref 3.5–5.1)
SODIUM BLD-SCNC: 141 MMOL/L (ref 136–145)
TOTAL PROTEIN: 7 G/DL (ref 6.4–8.2)
TRIGL SERPL-MCNC: 102 MG/DL (ref 0–150)
VLDLC SERPL CALC-MCNC: 20 MG/DL

## 2019-02-21 DIAGNOSIS — I10 ESSENTIAL HYPERTENSION: ICD-10-CM

## 2019-02-21 RX ORDER — LOSARTAN POTASSIUM 50 MG/1
TABLET ORAL
Qty: 90 TABLET | Refills: 1 | Status: SHIPPED | OUTPATIENT
Start: 2019-02-21 | End: 2019-08-03 | Stop reason: SDUPTHER

## 2019-05-30 ENCOUNTER — OFFICE VISIT (OUTPATIENT)
Dept: FAMILY MEDICINE CLINIC | Age: 74
End: 2019-05-30
Payer: MEDICARE

## 2019-05-30 VITALS
TEMPERATURE: 98.4 F | BODY MASS INDEX: 31.01 KG/M2 | DIASTOLIC BLOOD PRESSURE: 100 MMHG | WEIGHT: 234 LBS | HEART RATE: 81 BPM | SYSTOLIC BLOOD PRESSURE: 146 MMHG | HEIGHT: 73 IN | OXYGEN SATURATION: 95 %

## 2019-05-30 DIAGNOSIS — R07.89 CHEST WALL PAIN: Primary | ICD-10-CM

## 2019-05-30 PROCEDURE — G8427 DOCREV CUR MEDS BY ELIG CLIN: HCPCS | Performed by: FAMILY MEDICINE

## 2019-05-30 PROCEDURE — 1036F TOBACCO NON-USER: CPT | Performed by: FAMILY MEDICINE

## 2019-05-30 PROCEDURE — 99213 OFFICE O/P EST LOW 20 MIN: CPT | Performed by: FAMILY MEDICINE

## 2019-05-30 PROCEDURE — G8417 CALC BMI ABV UP PARAM F/U: HCPCS | Performed by: FAMILY MEDICINE

## 2019-05-30 PROCEDURE — 1123F ACP DISCUSS/DSCN MKR DOCD: CPT | Performed by: FAMILY MEDICINE

## 2019-05-30 PROCEDURE — 4040F PNEUMOC VAC/ADMIN/RCVD: CPT | Performed by: FAMILY MEDICINE

## 2019-05-30 PROCEDURE — 3017F COLORECTAL CA SCREEN DOC REV: CPT | Performed by: FAMILY MEDICINE

## 2019-05-30 ASSESSMENT — PATIENT HEALTH QUESTIONNAIRE - PHQ9
SUM OF ALL RESPONSES TO PHQ QUESTIONS 1-9: 0
SUM OF ALL RESPONSES TO PHQ QUESTIONS 1-9: 0
1. LITTLE INTEREST OR PLEASURE IN DOING THINGS: 0
SUM OF ALL RESPONSES TO PHQ9 QUESTIONS 1 & 2: 0
2. FEELING DOWN, DEPRESSED OR HOPELESS: 0

## 2019-05-30 NOTE — PROGRESS NOTES
PROBLEM VISIT NOTE     Subjective:     Chief Complaint   Patient presents with    Breast Pain     sharp shooting pain at bottom of left breast. 5 times today, goes away quickly. - approx 2-3 weeks     Corona Alfaro is a 68 y.o. male who presents pain episodic left chest  Sharp, lasts seconds. Worse when reach for something. About 5x a day. Duration 3 weeks  Associated with reaching but not always  Denies chest pressure, shortness of breath, N, sweat, rash, lumps    Patient's medications, allergies, past medical, and social histories were reviewed and updated as appropriate (see below). Review of Systems   General ROS: fever? No,    Night sweats? No  Endocrine ROS:malaise/lethargy? No   Unexpected weight changes? No  Respiratory ROS: cough? No   Shortness of breath? No  Cardiovascular ROS:chest pain? Yes   Shortness of breath with exertion? No  Gastrointestinal ROS: abdominal pain? No   Change in stools? No  Genito-Urinary ROS: painful urination? No   Trouble voiding? No  Neurological ROS: TIA or stroke symptoms? No   Numbness/tingling in feet? No    * Documentation provided by medical assistant reviewed and updated by provider. HISTORY:  Patient's medications, allergies, past medical, and social histories were reviewed and updated as appropriate.      CHART REVIEW  Health Maintenance   Topic Date Due    A1C test (Diabetic or Prediabetic)  12/19/2019    Potassium monitoring  12/19/2019    Creatinine monitoring  12/19/2019    Lipid screen  12/19/2023    Colon cancer screen colonoscopy  03/15/2026    DTaP/Tdap/Td vaccine (3 - Td) 08/12/2027    Flu vaccine  Completed    Shingles Vaccine  Completed    Pneumococcal 65+ years Vaccine  Completed    Hepatitis C screen  Discontinued     The 10-year ASCVD risk score (Tierra Kiser, et al., 2013) is: 26.3%    Values used to calculate the score:      Age: 68 years      Sex: Male      Is Non- : No      Diabetic: No      Tobacco smoker: No      Systolic Blood Pressure: 546 mmHg      Is BP treated: Yes      HDL Cholesterol: 55 mg/dL      Total Cholesterol: 200 mg/dL  Prior to Visit Medications    Medication Sig Taking? Authorizing Provider   losartan (COZAAR) 50 MG tablet TAKE 1 TABLET BY MOUTH EVERY DAY Yes ROSAMARIA Campbell CNP   esomeprazole (NEXIUM) 20 MG delayed release capsule TAKE 1 CAPSULE BY MOUTH EVERY DAY IN THE MORNING BEFORE BREAKFAST Yes ROSAMARIA Campbell CNP   atorvastatin (LIPITOR) 80 MG tablet TAKE 1 TABLET BY MOUTH DAILY Yes ROSAMARIA Campbell CNP   cetirizine-psuedoephedrine (ZYRTEC-D) 5-120 MG per extended release tablet Take 1 tablet by mouth 2 times daily Yes Historical Provider, MD   polyethylene glycol (GLYCOLAX) powder Take 17 g by mouth daily Yes Historical Provider, MD   tadalafil (CIALIS) 20 MG tablet Take 20 mg by mouth as needed for Erectile Dysfunction Yes Historical Provider, MD   hydrocortisone 1 % cream Apply topically as needed Apply topically 2 times daily. Yes Historical Provider, MD   fluticasone (FLONASE) 50 MCG/ACT nasal spray 1 spray by Nasal route daily. Patient taking differently: 1 spray by Nasal route as needed  Yes Mervin Seymour MD   aspirin EC 81 MG EC tablet Take 81 mg by mouth daily. Yes Historical Provider, MD   brimonidine-timolol (COMBIGAN) 0.2-0.5 % ophthalmic solution Place 1 drop into the right eye every 12 hours. Yes Giovana Fox MD      Family History   Problem Relation Age of Onset    Heart Disease Mother     High Blood Pressure Mother     High Blood Pressure Father      Social History     Tobacco Use    Smoking status: Former Smoker     Packs/day: 0.50     Years: 3.00     Pack years: 1.50     Types: Cigarettes     Start date: 1967     Last attempt to quit: 1970     Years since quittin.4    Smokeless tobacco: Never Used    Tobacco comment: counseled on tobacco exposure avoidance   Substance Use Topics    Alcohol use:  Yes Alcohol/week: 0.0 oz     Comment: 2-3 drinks 5 days per week beer or gin    Drug use: No      LAST LABS  Cholesterol, Total   Date Value Ref Range Status   12/19/2018 200 (H) 0 - 199 mg/dL Final     LDL Calculated   Date Value Ref Range Status   12/19/2018 125 (H) <100 mg/dL Final     HDL   Date Value Ref Range Status   12/19/2018 55 40 - 60 mg/dL Final     Triglycerides   Date Value Ref Range Status   12/19/2018 102 0 - 150 mg/dL Final     Lab Results   Component Value Date    GLUCOSE 89 12/19/2018     Lab Results   Component Value Date     12/19/2018    K 4.5 12/19/2018    CREATININE 0.8 12/19/2018     Lab Results   Component Value Date    WBC 5.4 03/15/2017    HGB 16.3 03/15/2017    HCT 48.0 03/15/2017    MCV 99.0 03/15/2017     03/15/2017     Lab Results   Component Value Date    ALT 26 12/19/2018    AST 27 12/19/2018    ALKPHOS 77 12/19/2018    BILITOT 0.3 12/19/2018     No results found for: TSH  Lab Results   Component Value Date    LABA1C 5.6 12/19/2018      Objective:    PHYSICAL EXAM   BP (!) 140/98 (Site: Left Upper Arm, Position: Sitting, Cuff Size: Large Adult)   Pulse 81   Temp 98.4 °F (36.9 °C) (Oral)   Ht 6' 1\" (1.854 m)   Wt 234 lb (106.1 kg)   SpO2 95%   BMI 30.87 kg/m²   BP Readings from Last 5 Encounters:   05/30/19 (!) 140/98   12/17/18 110/70   05/07/18 124/80   12/04/17 122/72   11/06/17 128/72     Wt Readings from Last 5 Encounters:   05/30/19 234 lb (106.1 kg)   12/17/18 235 lb (106.6 kg)   05/07/18 237 lb (107.5 kg)   12/04/17 234 lb (106.1 kg)   11/06/17 231 lb (104.8 kg)      GENERAL:   · well-developed, well-nourished, alert, no distress. LUNGS:    · Breathing unlabored  · clear to auscultation bilaterally and good air movement  CARDIOVASC:   · regular rate and rhythm, S1, S2 normal. No murmurs noted.   · Apical impulse normal  SKIN: warm and dry  · No rashes or suspicious lesions  · No nodules or induration in L breast  MUSCULOSKEL:    · No rib tenderness Assessment and Plan:      Diagnosis Orders   1. Chest wall pain     Seems like localized nerve issue. Could try NSAID but pt said not bad enough. Plan below. INSTRUCTIONS  See Ivis Gamboa (Nurse Practitioner) next week. Not cardiac. Try heat or ice to area.

## 2019-05-30 NOTE — PATIENT INSTRUCTIONS
INSTRUCTIONS  See Celmagali Si (Nurse Practitioner) next week. Not cardiac. Try heat or ice to area.

## 2019-06-06 ENCOUNTER — OFFICE VISIT (OUTPATIENT)
Dept: FAMILY MEDICINE CLINIC | Age: 74
End: 2019-06-06
Payer: MEDICARE

## 2019-06-06 VITALS
DIASTOLIC BLOOD PRESSURE: 80 MMHG | HEART RATE: 85 BPM | BODY MASS INDEX: 31 KG/M2 | RESPIRATION RATE: 18 BRPM | SYSTOLIC BLOOD PRESSURE: 124 MMHG | WEIGHT: 235 LBS

## 2019-06-06 DIAGNOSIS — Z01.818 PREOP EXAMINATION: Primary | ICD-10-CM

## 2019-06-06 DIAGNOSIS — H26.9 CATARACT OF RIGHT EYE, UNSPECIFIED CATARACT TYPE: ICD-10-CM

## 2019-06-06 DIAGNOSIS — I10 ESSENTIAL HYPERTENSION: ICD-10-CM

## 2019-06-06 PROCEDURE — 4040F PNEUMOC VAC/ADMIN/RCVD: CPT | Performed by: NURSE PRACTITIONER

## 2019-06-06 PROCEDURE — G8427 DOCREV CUR MEDS BY ELIG CLIN: HCPCS | Performed by: NURSE PRACTITIONER

## 2019-06-06 PROCEDURE — 99214 OFFICE O/P EST MOD 30 MIN: CPT | Performed by: NURSE PRACTITIONER

## 2019-06-06 PROCEDURE — 1123F ACP DISCUSS/DSCN MKR DOCD: CPT | Performed by: NURSE PRACTITIONER

## 2019-06-06 PROCEDURE — 93000 ELECTROCARDIOGRAM COMPLETE: CPT | Performed by: NURSE PRACTITIONER

## 2019-06-06 PROCEDURE — G8417 CALC BMI ABV UP PARAM F/U: HCPCS | Performed by: NURSE PRACTITIONER

## 2019-06-06 PROCEDURE — 3017F COLORECTAL CA SCREEN DOC REV: CPT | Performed by: NURSE PRACTITIONER

## 2019-06-06 PROCEDURE — 1036F TOBACCO NON-USER: CPT | Performed by: NURSE PRACTITIONER

## 2019-06-06 NOTE — PROGRESS NOTES
Preoperative Consultation      Deni Campbell  YOB: 1945    Date of Service:  6/6/2019    Vitals:    06/06/19 0949   BP: 124/80   Site: Right Upper Arm   Position: Sitting   Cuff Size: Medium Adult   Pulse: 85   Resp: 18   Weight: 235 lb (106.6 kg)      Wt Readings from Last 2 Encounters:   06/06/19 235 lb (106.6 kg)   05/30/19 234 lb (106.1 kg)     BP Readings from Last 3 Encounters:   06/06/19 124/80   05/30/19 (!) 146/100   12/17/18 110/70        Chief Complaint   Patient presents with    Pre-op Exam     6/18, [facility], Cataract right eye,      Allergies   Allergen Reactions    Lisinopril Other (See Comments)     cough     Outpatient Medications Marked as Taking for the 6/6/19 encounter (Office Visit) with Theoplmarianna Sosa, APRN - CNP   Medication Sig Dispense Refill    losartan (COZAAR) 50 MG tablet TAKE 1 TABLET BY MOUTH EVERY DAY 90 tablet 1    esomeprazole (NEXIUM) 20 MG delayed release capsule TAKE 1 CAPSULE BY MOUTH EVERY DAY IN THE MORNING BEFORE BREAKFAST 90 capsule 1    atorvastatin (LIPITOR) 80 MG tablet TAKE 1 TABLET BY MOUTH DAILY 90 tablet 1    cetirizine-psuedoephedrine (ZYRTEC-D) 5-120 MG per extended release tablet Take 1 tablet by mouth 2 times daily      polyethylene glycol (GLYCOLAX) powder Take 17 g by mouth daily      tadalafil (CIALIS) 20 MG tablet Take 20 mg by mouth as needed for Erectile Dysfunction      hydrocortisone 1 % cream Apply topically as needed Apply topically 2 times daily.  fluticasone (FLONASE) 50 MCG/ACT nasal spray 1 spray by Nasal route daily. (Patient taking differently: 1 spray by Nasal route as needed ) 3 Bottle 3    aspirin EC 81 MG EC tablet Take 81 mg by mouth daily.  brimonidine-timolol (COMBIGAN) 0.2-0.5 % ophthalmic solution Place 1 drop into the right eye every 12 hours.            This patient presents to the office today for a preoperative consultation at the request of surgeon,  Monica Diggs, who plans on performing right cataract removal on June 18, 2019 at [facility] for [employer] [facility]. The current problem began 15  years ago, and symptoms have been worsening with time. Patient reports that he is having decreased and blurry vision.      Planned anesthesia: MAC  Known anesthesia problems: None   Bleeding risk: No recent or remote history of abnormal bleeding  Personal or FH of DVT/PE: No    Patient objection to receiving blood products: No    Patient Active Problem List   Diagnosis    Adenocarcinoma of prostate-s/p total prostatectomy--sees dr Christa Hernandez of left eye    AAA (abdominal aortic aneurysm)-(3.2 cm- lunchanged--done 10/16)--repeat 1 yr    Diverticula, colon    Chronic non-seasonal allergic rhinitis    Glaucoma-sees dr Lucy Lucia BPH w/o urinary obs/LUTS--s/p prostatectomy    Hypercholesteremia    GERD (gastroesophageal reflux disease)--on daily ppi    Colonic polyp-(last colo 3/16--pos-repeat 3-5yrs)-dr mccarty    ED (erectile dysfunction)--started on daily cialis by dr Deanna Tesfaye Incisional hernia, without obstruction or gangrene    Prediabetes    Essential hypertension       Past Medical History:   Diagnosis Date    AAA (abdominal aortic aneurysm) ([address] a    Allergic rhinitis, seasonal     Blindness of left eye     Colonic polyp     Diverticulosis of colon     Gastritis     hx of     GERD (gastroesophageal reflux disease)     Glaucoma NEC     Hypercholesteremia     Hypertrophy of prostate without urinary obstruction and other lower urinary tract symptoms (LUTS)      Past Surgical History:   Procedure Laterality Date    COLONOSCOPY      ENDOSCOPY, COLON, DIAGNOSTIC      EYE SURGERY      PROSTATECTOMY  2010    dr proctor/rohit    REFRACTIVE SURGERY      right eye     TOTAL KNEE ARTHROPLASTY Left 4.19.16    Dr. Philip Yoder  03/15/2017    with mesh     Family History   Problem Relation Age of Onset    Heart Disease Mother     High Blood Pressure Mother     High Blood Pressure Father      Social History     Socioeconomic History    Marital status:      Spouse name: Naresh Morales Number of children: 3    Years of education: Not on file    Highest education level: Not on file   Occupational History    Occupation: Retired-    Tobacco Use    Smoking status: Former Smoker     Packs/day: 0.50     Years: 3.00     Pack years: 1.50     Types: Cigarettes     Start date: 1967     Last attempt to quit: 1970     Years since quittin.4    Smokeless tobacco: Never Used    Tobacco comment: counseled on tobacco exposure avoidance   Substance and Sexual Activity    Alcohol use: Yes     Alcohol/week: 0.0 oz     Comment: 2-3 drinks 5 days per week beer or gin    Drug use: No    Sexual activity: Yes     Partners: Female       Review of Systems  A comprehensive review of systems was negative except for what was noted in the HPI. Physical Exam   Constitutional: He is oriented to person, place, and time. He appears well-developed and well-nourished. No distress. HENT:   Head: Normocephalic and atraumatic. Mouth/Throat: Uvula is midline, oropharynx is clear and moist and mucous membranes are normal.   Eyes: Conjunctivae and EOM are normal. Pupils are equal, round, and reactive to light. Neck: Trachea normal and normal range of motion. Neck supple. No JVD present. Carotid bruit is not present. Cardiovascular: Normal rate, regular rhythm, normal heart sounds and intact distal pulses. Exam reveals no gallop and no friction rub. No murmur heard. Pulmonary/Chest: Effort normal and breath sounds normal. No respiratory distress. He has no wheezes. He has no rales. Musculoskeletal: He exhibits no edema and no tenderness. Neurological: He is alert and oriented to person, place, and time. He has normal strength. No cranial nerve deficit or sensory deficit. Coordination and gait normal.   Skin: Skin is warm and dry. No rash noted. No erythema. Psychiatric: He has a normal mood and affect. His behavior is normal.     EKG Interpretation:  normal sinus rhythm, unchanged from previous tracing (3/15/19). Lab Review   Lab Results   Component Value Date     12/19/2018    K 4.5 12/19/2018     12/19/2018    CO2 25 12/19/2018    BUN 17 12/19/2018    CREATININE 0.8 12/19/2018    GLUCOSE 89 12/19/2018    GLUCOSE 93 07/13/2011    CALCIUM 9.5 12/19/2018     Lab Results   Component Value Date    WBC 5.4 03/15/2017    HGB 16.3 03/15/2017    HCT 48.0 03/15/2017    MCV 99.0 03/15/2017     03/15/2017      Lab Results   Component Value Date    LABA1C 5.6 12/19/2018     Lab Results   Component Value Date    .0 12/19/2018      Assessment:       68 y.o. patient with planned surgery as above. Known risk factors for perioperative complications: Hypertension  Current medications which may produce withdrawal symptoms if withheld perioperatively: none      Plan:     1. Preoperative workup as follows: ECG  2. Change in medication regimen before surgery: Take losartan on morning of surgery with sip of water, and hold all other medications until after surgery, Discontinue ASA 7 days before surgery  3. Prophylaxis for cardiac events with perioperative beta-blockers: Not indicated  ACC/AHA indications for pre-operative beta-blocker use:    · Vascular surgery with history of postitive stress test  · Intermediate or high risk surgery with history of CAD   · Intermediate or high risk surgery with multiple clinical predictors of CAD- 2 of the following: history of compensated or prior heart failure, history of cerebrovascular disease, DM, or renal insufficiency    Routine administration of higher-dose, long-acting metoprolol in beta-blocker-naïve patients on the day of surgery, and in the absence of dose titration is associated with an overall increase in mortality. Beta-blockers should be started days to weeks prior to surgery and titrated to pulse < 70.  4. Deep vein thrombosis prophylaxis: regimen to be chosen by surgical team  5. No contraindications to planned surgery of right cataract removal with Dr. Amanda Bishop on 6/18/19. 6.  HTN- controlled current therapy

## 2019-07-26 DIAGNOSIS — K21.9 GASTROESOPHAGEAL REFLUX DISEASE, ESOPHAGITIS PRESENCE NOT SPECIFIED: ICD-10-CM

## 2019-08-03 DIAGNOSIS — I10 ESSENTIAL HYPERTENSION: ICD-10-CM

## 2019-08-05 RX ORDER — LOSARTAN POTASSIUM 50 MG/1
TABLET ORAL
Qty: 90 TABLET | Refills: 0 | Status: SHIPPED | OUTPATIENT
Start: 2019-08-05 | End: 2019-11-03 | Stop reason: SDUPTHER

## 2019-08-07 ENCOUNTER — HOSPITAL ENCOUNTER (EMERGENCY)
Age: 74
Discharge: HOME | End: 2019-08-08
Payer: MEDICARE

## 2019-08-07 DIAGNOSIS — N20.1 URETEROLITHIASIS: Primary | ICD-10-CM

## 2019-08-07 DIAGNOSIS — I71.40 ABDOMINAL AORTIC ANEURYSM (AAA) WITHOUT RUPTURE: ICD-10-CM

## 2019-08-07 LAB
A/G RATIO: 1.4 (ref 1.1–2.2)
ALBUMIN SERPL-MCNC: 4.2 G/DL (ref 3.4–5)
ALP BLD-CCNC: 77 U/L (ref 40–129)
ALT SERPL-CCNC: 24 U/L (ref 10–40)
ANION GAP SERPL CALCULATED.3IONS-SCNC: 15 MMOL/L (ref 3–16)
AST SERPL-CCNC: 28 U/L (ref 15–37)
BASOPHILS ABSOLUTE: 0 K/UL (ref 0–0.2)
BASOPHILS RELATIVE PERCENT: 0.3 %
BILIRUB SERPL-MCNC: 0.6 MG/DL (ref 0–1)
BILIRUBIN URINE: ABNORMAL
BLOOD, URINE: ABNORMAL
BUN BLDV-MCNC: 16 MG/DL (ref 7–20)
CALCIUM SERPL-MCNC: 9.6 MG/DL (ref 8.3–10.6)
CHLORIDE BLD-SCNC: 100 MMOL/L (ref 99–110)
CLARITY: ABNORMAL
CO2: 25 MMOL/L (ref 21–32)
COLOR: ABNORMAL
CREAT SERPL-MCNC: 0.8 MG/DL (ref 0.8–1.3)
EOSINOPHILS ABSOLUTE: 0.1 K/UL (ref 0–0.6)
EOSINOPHILS RELATIVE PERCENT: 1.6 %
EPITHELIAL CELLS, UA: 1 /HPF (ref 0–5)
GFR AFRICAN AMERICAN: >60
GFR NON-AFRICAN AMERICAN: >60
GLOBULIN: 3 G/DL
GLUCOSE BLD-MCNC: 120 MG/DL (ref 70–99)
GLUCOSE URINE: NEGATIVE MG/DL
HCT VFR BLD CALC: 45.1 % (ref 40.5–52.5)
HEMOGLOBIN: 15.4 G/DL (ref 13.5–17.5)
HYALINE CASTS: 1 /LPF (ref 0–8)
KETONES, URINE: ABNORMAL MG/DL
LEUKOCYTE ESTERASE, URINE: ABNORMAL
LIPASE: 17 U/L (ref 13–60)
LYMPHOCYTES ABSOLUTE: 1.4 K/UL (ref 1–5.1)
LYMPHOCYTES RELATIVE PERCENT: 19.4 %
MCH RBC QN AUTO: 34.9 PG (ref 26–34)
MCHC RBC AUTO-ENTMCNC: 34.1 G/DL (ref 31–36)
MCV RBC AUTO: 102.4 FL (ref 80–100)
MICROSCOPIC EXAMINATION: YES
MONOCYTES ABSOLUTE: 0.6 K/UL (ref 0–1.3)
MONOCYTES RELATIVE PERCENT: 8.8 %
NEUTROPHILS ABSOLUTE: 5 K/UL (ref 1.7–7.7)
NEUTROPHILS RELATIVE PERCENT: 69.9 %
NITRITE, URINE: NEGATIVE
PDW BLD-RTO: 14.2 % (ref 12.4–15.4)
PH UA: 7 (ref 5–8)
PLATELET # BLD: 183 K/UL (ref 135–450)
PMV BLD AUTO: 9.1 FL (ref 5–10.5)
POTASSIUM REFLEX MAGNESIUM: 3.6 MMOL/L (ref 3.5–5.1)
PROTEIN UA: 100 MG/DL
RBC # BLD: 4.4 M/UL (ref 4.2–5.9)
RBC UA: >900 /HPF (ref 0–4)
SODIUM BLD-SCNC: 140 MMOL/L (ref 136–145)
SPECIFIC GRAVITY UA: 1.02 (ref 1–1.03)
TOTAL PROTEIN: 7.2 G/DL (ref 6.4–8.2)
URINE REFLEX TO CULTURE: YES
URINE TYPE: ABNORMAL
UROBILINOGEN, URINE: 1 E.U./DL
WBC # BLD: 7.1 K/UL (ref 4–11)
WBC UA: 8 /HPF (ref 0–5)

## 2019-08-07 PROCEDURE — 81001 URINALYSIS AUTO W/SCOPE: CPT

## 2019-08-07 PROCEDURE — 87086 URINE CULTURE/COLONY COUNT: CPT

## 2019-08-07 PROCEDURE — 83690 ASSAY OF LIPASE: CPT

## 2019-08-07 PROCEDURE — 36415 COLL VENOUS BLD VENIPUNCTURE: CPT

## 2019-08-07 PROCEDURE — 99284 EMERGENCY DEPT VISIT MOD MDM: CPT

## 2019-08-07 PROCEDURE — 85025 COMPLETE CBC W/AUTO DIFF WBC: CPT

## 2019-08-07 PROCEDURE — 93005 ELECTROCARDIOGRAM TRACING: CPT | Performed by: EMERGENCY MEDICINE

## 2019-08-07 PROCEDURE — 80053 COMPREHEN METABOLIC PANEL: CPT

## 2019-08-07 ASSESSMENT — PAIN DESCRIPTION - PAIN TYPE: TYPE: ACUTE PAIN

## 2019-08-07 ASSESSMENT — PAIN DESCRIPTION - LOCATION: LOCATION: ABDOMEN

## 2019-08-07 ASSESSMENT — PAIN DESCRIPTION - ORIENTATION: ORIENTATION: RIGHT;LOWER

## 2019-08-07 ASSESSMENT — PAIN SCALES - GENERAL: PAINLEVEL_OUTOF10: 9

## 2019-08-08 ENCOUNTER — APPOINTMENT (OUTPATIENT)
Dept: CT IMAGING | Age: 74
End: 2019-08-08
Payer: MEDICARE

## 2019-08-08 VITALS
BODY MASS INDEX: 31.06 KG/M2 | OXYGEN SATURATION: 95 % | HEIGHT: 73 IN | WEIGHT: 234.35 LBS | SYSTOLIC BLOOD PRESSURE: 148 MMHG | RESPIRATION RATE: 18 BRPM | HEART RATE: 74 BPM | TEMPERATURE: 97.4 F | DIASTOLIC BLOOD PRESSURE: 81 MMHG

## 2019-08-08 LAB
EKG ATRIAL RATE: 73 BPM
EKG DIAGNOSIS: NORMAL
EKG P AXIS: 30 DEGREES
EKG P-R INTERVAL: 188 MS
EKG Q-T INTERVAL: 394 MS
EKG QRS DURATION: 78 MS
EKG QTC CALCULATION (BAZETT): 434 MS
EKG R AXIS: 50 DEGREES
EKG T AXIS: 78 DEGREES
EKG VENTRICULAR RATE: 73 BPM

## 2019-08-08 PROCEDURE — 93010 ELECTROCARDIOGRAM REPORT: CPT | Performed by: INTERNAL MEDICINE

## 2019-08-08 PROCEDURE — 74176 CT ABD & PELVIS W/O CONTRAST: CPT

## 2019-08-08 ASSESSMENT — PAIN DESCRIPTION - LOCATION: LOCATION: ABDOMEN

## 2019-08-08 ASSESSMENT — PAIN DESCRIPTION - PAIN TYPE: TYPE: ACUTE PAIN

## 2019-08-08 ASSESSMENT — PAIN DESCRIPTION - ORIENTATION: ORIENTATION: LOWER

## 2019-08-08 ASSESSMENT — PAIN SCALES - GENERAL: PAINLEVEL_OUTOF10: 2

## 2019-08-08 NOTE — ED PROVIDER NOTES
[address] HERNIA REPAIR  03/15/2017    with mesh       CURRENT MEDICATIONS  (may include discharge medications prescribed in the ED)  Current Outpatient Rx   Medication Sig Dispense Refill    losartan (COZAAR) 50 MG tablet TAKE 1 TABLET BY MOUTH EVERY DAY 90 tablet 0    esomeprazole (NEXIUM) 20 MG delayed release capsule TAKE 1 CAPSULE BY MOUTH EVERY DAY IN THE MORNING BEFORE BREAKFAST 90 capsule 0    atorvastatin (LIPITOR) 80 MG tablet TAKE 1 TABLET BY MOUTH DAILY 90 tablet 1    cetirizine-psuedoephedrine (ZYRTEC-D) 5-120 MG per extended release tablet Take 1 tablet by mouth 2 times daily      polyethylene glycol (GLYCOLAX) powder Take 17 g by mouth daily      tadalafil (CIALIS) 20 MG tablet Take 20 mg by mouth as needed for Erectile Dysfunction      hydrocortisone 1 % cream Apply topically as needed Apply topically 2 times daily.  fluticasone (FLONASE) 50 MCG/ACT nasal spray 1 spray by Nasal route daily. (Patient taking differently: 1 spray by Nasal route as needed ) 3 Bottle 3    aspirin EC 81 MG EC tablet Take 81 mg by mouth daily.  brimonidine-timolol (COMBIGAN) 0.2-0.5 % ophthalmic solution Place 1 drop into the right eye every 12 hours.            ALLERGIES    Allergies   Allergen Reactions    Lisinopril Other (See Comments)     cough       SOCIAL HISTORY    Social History     Socioeconomic History    Marital status:      Spouse name: Rosalinda Bustillo Number of children: 3    Years of education: Not on file    Highest education level: Not on file   Occupational History    Occupation: Retired-    Social Needs    Financial resource strain: Not on file    Food insecurity:     Worry: Not on file     Inability: Not on file    Transportation needs:     Medical: Not on file     Non-medical: Not on file   Tobacco Use    Smoking status: Former Smoker     Packs/day: 0.50     Years: 3.00     Pack years: 1.50     Types: Cigarettes     Start date:

## 2019-08-08 NOTE — ED TRIAGE NOTES
Pt to ED with right lower quadrant pain. Pt states onset was about 1900 on 8/7/2019. Pt states that pain has mostly subsided at this point. Pt states he felt as though he needed to have a bowel movement when he went home about 2000 last night. Pt states he had moderate BM. Pt denies emesis but was mildly nauseated earlier.

## 2019-08-09 LAB
ORGANISM: ABNORMAL
URINE CULTURE, ROUTINE: ABNORMAL
URINE CULTURE, ROUTINE: ABNORMAL

## 2019-09-13 ENCOUNTER — OFFICE VISIT (OUTPATIENT)
Dept: FAMILY MEDICINE CLINIC | Age: 74
End: 2019-09-13
Payer: MEDICARE

## 2019-09-13 VITALS
RESPIRATION RATE: 18 BRPM | OXYGEN SATURATION: 98 % | TEMPERATURE: 99.4 F | BODY MASS INDEX: 30.48 KG/M2 | HEART RATE: 78 BPM | WEIGHT: 231 LBS | SYSTOLIC BLOOD PRESSURE: 122 MMHG | DIASTOLIC BLOOD PRESSURE: 80 MMHG

## 2019-09-13 DIAGNOSIS — J01.90 ACUTE BACTERIAL SINUSITIS: Primary | ICD-10-CM

## 2019-09-13 DIAGNOSIS — B96.89 ACUTE BACTERIAL SINUSITIS: Primary | ICD-10-CM

## 2019-09-13 PROCEDURE — 3017F COLORECTAL CA SCREEN DOC REV: CPT | Performed by: NURSE PRACTITIONER

## 2019-09-13 PROCEDURE — 1123F ACP DISCUSS/DSCN MKR DOCD: CPT | Performed by: NURSE PRACTITIONER

## 2019-09-13 PROCEDURE — 1036F TOBACCO NON-USER: CPT | Performed by: NURSE PRACTITIONER

## 2019-09-13 PROCEDURE — 99213 OFFICE O/P EST LOW 20 MIN: CPT | Performed by: NURSE PRACTITIONER

## 2019-09-13 PROCEDURE — G8427 DOCREV CUR MEDS BY ELIG CLIN: HCPCS | Performed by: NURSE PRACTITIONER

## 2019-09-13 PROCEDURE — 4040F PNEUMOC VAC/ADMIN/RCVD: CPT | Performed by: NURSE PRACTITIONER

## 2019-09-13 PROCEDURE — G8417 CALC BMI ABV UP PARAM F/U: HCPCS | Performed by: NURSE PRACTITIONER

## 2019-09-13 RX ORDER — CEFDINIR 300 MG/1
300 CAPSULE ORAL 2 TIMES DAILY
Qty: 20 CAPSULE | Refills: 0 | Status: SHIPPED | OUTPATIENT
Start: 2019-09-13 | End: 2019-09-23

## 2019-09-13 ASSESSMENT — ENCOUNTER SYMPTOMS
SHORTNESS OF BREATH: 0
DIARRHEA: 0
RHINORRHEA: 1
SINUS PAIN: 0
SINUS PRESSURE: 0
VOMITING: 0
NAUSEA: 0
CHEST TIGHTNESS: 0
COUGH: 1
SORE THROAT: 1

## 2019-09-13 NOTE — PROGRESS NOTES
tablet by mouth 2 times daily      polyethylene glycol (GLYCOLAX) powder Take 17 g by mouth daily      tadalafil (CIALIS) 20 MG tablet Take 20 mg by mouth as needed for Erectile Dysfunction      hydrocortisone 1 % cream Apply topically as needed Apply topically 2 times daily.  fluticasone (FLONASE) 50 MCG/ACT nasal spray 1 spray by Nasal route daily. (Patient taking differently: 1 spray by Nasal route as needed ) 3 Bottle 3    aspirin EC 81 MG EC tablet Take 81 mg by mouth daily.  brimonidine-timolol (COMBIGAN) 0.2-0.5 % ophthalmic solution Place 1 drop into the right eye every 12 hours. No current facility-administered medications for this visit. Allergies   Allergen Reactions    Lisinopril Other (See Comments)     cough       Review of Systems   Constitutional: Positive for activity change, chills and fatigue. Negative for fever. HENT: Positive for congestion, postnasal drip, rhinorrhea and sore throat. Negative for ear pain, sinus pressure and sinus pain. Respiratory: Positive for cough. Negative for chest tightness and shortness of breath. Small amount of mucus production    Cardiovascular: Negative for chest pain. Gastrointestinal: Negative for diarrhea, nausea and vomiting. Neurological: Negative for dizziness and headaches. Vitals:    09/13/19 0937   BP: 122/80   Site: Right Upper Arm   Position: Sitting   Cuff Size: Medium Adult   Pulse: 78   Resp: 18   Temp: 99.4 °F (37.4 °C)   TempSrc: Oral   SpO2: 98%   Weight: 231 lb (104.8 kg)       Body mass index is 30.48 kg/m². Wt Readings from Last 3 Encounters:   09/13/19 231 lb (104.8 kg)   08/07/19 234 lb 5.6 oz (106.3 kg)   06/06/19 235 lb (106.6 kg)       BP Readings from Last 3 Encounters:   09/13/19 122/80   08/08/19 (!) 148/81   06/06/19 124/80       Physical Exam   Constitutional: He is oriented to person, place, and time. He appears well-developed and well-nourished. No distress.    HENT:   Head:

## 2019-09-19 ENCOUNTER — OFFICE VISIT (OUTPATIENT)
Dept: FAMILY MEDICINE CLINIC | Age: 74
End: 2019-09-19
Payer: MEDICARE

## 2019-09-19 VITALS
HEART RATE: 98 BPM | DIASTOLIC BLOOD PRESSURE: 78 MMHG | TEMPERATURE: 98.5 F | OXYGEN SATURATION: 98 % | WEIGHT: 229 LBS | RESPIRATION RATE: 18 BRPM | SYSTOLIC BLOOD PRESSURE: 120 MMHG | BODY MASS INDEX: 30.21 KG/M2

## 2019-09-19 DIAGNOSIS — R73.03 PREDIABETES: ICD-10-CM

## 2019-09-19 DIAGNOSIS — K21.9 GASTROESOPHAGEAL REFLUX DISEASE, ESOPHAGITIS PRESENCE NOT SPECIFIED: ICD-10-CM

## 2019-09-19 DIAGNOSIS — I71.40 ABDOMINAL AORTIC ANEURYSM (AAA) WITHOUT RUPTURE: ICD-10-CM

## 2019-09-19 DIAGNOSIS — I10 ESSENTIAL HYPERTENSION: ICD-10-CM

## 2019-09-19 DIAGNOSIS — E78.00 HYPERCHOLESTEREMIA: Primary | ICD-10-CM

## 2019-09-19 DIAGNOSIS — J30.89 CHRONIC NON-SEASONAL ALLERGIC RHINITIS: ICD-10-CM

## 2019-09-19 PROCEDURE — 3017F COLORECTAL CA SCREEN DOC REV: CPT | Performed by: NURSE PRACTITIONER

## 2019-09-19 PROCEDURE — G8427 DOCREV CUR MEDS BY ELIG CLIN: HCPCS | Performed by: NURSE PRACTITIONER

## 2019-09-19 PROCEDURE — 4040F PNEUMOC VAC/ADMIN/RCVD: CPT | Performed by: NURSE PRACTITIONER

## 2019-09-19 PROCEDURE — 1036F TOBACCO NON-USER: CPT | Performed by: NURSE PRACTITIONER

## 2019-09-19 PROCEDURE — 1123F ACP DISCUSS/DSCN MKR DOCD: CPT | Performed by: NURSE PRACTITIONER

## 2019-09-19 PROCEDURE — G8417 CALC BMI ABV UP PARAM F/U: HCPCS | Performed by: NURSE PRACTITIONER

## 2019-09-19 PROCEDURE — 99214 OFFICE O/P EST MOD 30 MIN: CPT | Performed by: NURSE PRACTITIONER

## 2019-09-19 ASSESSMENT — ENCOUNTER SYMPTOMS
COUGH: 0
VOMITING: 0
RHINORRHEA: 1
CHEST TIGHTNESS: 0
SINUS COMPLAINT: 1
SHORTNESS OF BREATH: 0
DIARRHEA: 0
CONSTIPATION: 0
NAUSEA: 0

## 2019-09-19 NOTE — PROGRESS NOTES
drip and rhinorrhea. Respiratory: Negative for cough, chest tightness and shortness of breath. Cardiovascular: Negative for chest pain and leg swelling. Gastrointestinal: Negative for constipation, diarrhea, nausea and vomiting. Neurological: Negative for dizziness, syncope, weakness and headaches. Psychiatric/Behavioral: Negative for confusion. Vitals:    09/19/19 0913   BP: 120/78   Site: Right Upper Arm   Position: Sitting   Cuff Size: Medium Adult   Pulse: 98   Resp: 18   Temp: 98.5 °F (36.9 °C)   TempSrc: Oral   SpO2: 98%   Weight: 229 lb (103.9 kg)       Body mass index is 30.21 kg/m². Wt Readings from Last 3 Encounters:   09/19/19 229 lb (103.9 kg)   09/13/19 231 lb (104.8 kg)   08/07/19 234 lb 5.6 oz (106.3 kg)       BP Readings from Last 3 Encounters:   09/19/19 120/78   09/13/19 122/80   08/08/19 (!) 148/81       Physical Exam   Constitutional: He is oriented to person, place, and time. He appears well-developed and well-nourished. No distress. HENT:   Head: Normocephalic and atraumatic. Right Ear: Tympanic membrane, external ear and ear canal normal.   Left Ear: Tympanic membrane, external ear and ear canal normal.   Mouth/Throat: Uvula is midline, oropharynx is clear and moist and mucous membranes are normal.   Eyes: EOM are normal.   Neck: Neck supple. Carotid bruit is not present. Cardiovascular: Normal rate, regular rhythm and normal heart sounds. Exam reveals no gallop and no friction rub. No murmur heard. Pulmonary/Chest: Effort normal and breath sounds normal. No respiratory distress. Musculoskeletal: He exhibits no edema. Right ankle: He exhibits normal pulse. Left ankle: He exhibits normal pulse. Neurological: He is alert and oriented to person, place, and time. Skin: Skin is warm and dry. Psychiatric: He has a normal mood and affect.  His behavior is normal. Judgment and thought content normal.   Nursing note and vitals

## 2019-09-20 DIAGNOSIS — R73.03 PREDIABETES: ICD-10-CM

## 2019-09-20 DIAGNOSIS — I10 ESSENTIAL HYPERTENSION: ICD-10-CM

## 2019-09-20 DIAGNOSIS — E78.00 HYPERCHOLESTEREMIA: ICD-10-CM

## 2019-09-20 LAB
A/G RATIO: 2 (ref 1.1–2.2)
ALBUMIN SERPL-MCNC: 4.5 G/DL (ref 3.4–5)
ALP BLD-CCNC: 80 U/L (ref 40–129)
ALT SERPL-CCNC: 34 U/L (ref 10–40)
ANION GAP SERPL CALCULATED.3IONS-SCNC: 14 MMOL/L (ref 3–16)
AST SERPL-CCNC: 34 U/L (ref 15–37)
BILIRUB SERPL-MCNC: 0.7 MG/DL (ref 0–1)
BUN BLDV-MCNC: 12 MG/DL (ref 7–20)
CALCIUM SERPL-MCNC: 9.4 MG/DL (ref 8.3–10.6)
CHLORIDE BLD-SCNC: 101 MMOL/L (ref 99–110)
CHOLESTEROL, TOTAL: 142 MG/DL (ref 0–199)
CO2: 23 MMOL/L (ref 21–32)
CREAT SERPL-MCNC: 0.8 MG/DL (ref 0.8–1.3)
GFR AFRICAN AMERICAN: >60
GFR NON-AFRICAN AMERICAN: >60
GLOBULIN: 2.3 G/DL
GLUCOSE BLD-MCNC: 97 MG/DL (ref 70–99)
HDLC SERPL-MCNC: 37 MG/DL (ref 40–60)
LDL CHOLESTEROL CALCULATED: 89 MG/DL
POTASSIUM SERPL-SCNC: 4.6 MMOL/L (ref 3.5–5.1)
SODIUM BLD-SCNC: 138 MMOL/L (ref 136–145)
TOTAL PROTEIN: 6.8 G/DL (ref 6.4–8.2)
TRIGL SERPL-MCNC: 82 MG/DL (ref 0–150)
VLDLC SERPL CALC-MCNC: 16 MG/DL

## 2019-09-21 LAB
ESTIMATED AVERAGE GLUCOSE: 114 MG/DL
HBA1C MFR BLD: 5.6 %

## 2019-10-01 RX ORDER — ATORVASTATIN CALCIUM 80 MG/1
TABLET, FILM COATED ORAL
Qty: 90 TABLET | Refills: 1 | Status: SHIPPED | OUTPATIENT
Start: 2019-10-01 | End: 2020-03-12

## 2019-10-16 DIAGNOSIS — K21.9 GASTROESOPHAGEAL REFLUX DISEASE, ESOPHAGITIS PRESENCE NOT SPECIFIED: ICD-10-CM

## 2019-11-03 DIAGNOSIS — I10 ESSENTIAL HYPERTENSION: ICD-10-CM

## 2019-11-04 RX ORDER — LOSARTAN POTASSIUM 50 MG/1
TABLET ORAL
Qty: 90 TABLET | Refills: 1 | Status: SHIPPED | OUTPATIENT
Start: 2019-11-04 | End: 2019-12-13

## 2019-11-11 ENCOUNTER — HOSPITAL ENCOUNTER (OUTPATIENT)
Dept: ULTRASOUND IMAGING | Age: 74
Discharge: HOME | End: 2019-11-11
Payer: MEDICARE

## 2019-11-11 DIAGNOSIS — I71.40 ABDOMINAL AORTIC ANEURYSM (AAA) WITHOUT RUPTURE: ICD-10-CM

## 2019-11-11 PROCEDURE — 76775 US EXAM ABDO BACK WALL LIM: CPT

## 2019-12-10 ENCOUNTER — OFFICE VISIT (OUTPATIENT)
Dept: FAMILY MEDICINE CLINIC | Age: 74
End: 2019-12-10
Payer: MEDICARE

## 2019-12-10 VITALS
HEART RATE: 76 BPM | OXYGEN SATURATION: 99 % | DIASTOLIC BLOOD PRESSURE: 88 MMHG | TEMPERATURE: 98.2 F | SYSTOLIC BLOOD PRESSURE: 140 MMHG | WEIGHT: 235 LBS | BODY MASS INDEX: 31 KG/M2

## 2019-12-10 DIAGNOSIS — I10 ESSENTIAL HYPERTENSION: ICD-10-CM

## 2019-12-10 DIAGNOSIS — L02.11 CUTANEOUS ABSCESS OF NECK: Primary | ICD-10-CM

## 2019-12-10 PROCEDURE — 10060 I&D ABSCESS SIMPLE/SINGLE: CPT | Performed by: NURSE PRACTITIONER

## 2019-12-10 RX ORDER — CEPHALEXIN 500 MG/1
500 CAPSULE ORAL 3 TIMES DAILY
Qty: 30 CAPSULE | Refills: 0 | Status: SHIPPED | OUTPATIENT
Start: 2019-12-10 | End: 2019-12-20

## 2019-12-10 ASSESSMENT — ENCOUNTER SYMPTOMS
COUGH: 0
COLOR CHANGE: 1
SHORTNESS OF BREATH: 0

## 2019-12-13 ENCOUNTER — OFFICE VISIT (OUTPATIENT)
Dept: FAMILY MEDICINE CLINIC | Age: 74
End: 2019-12-13
Payer: MEDICARE

## 2019-12-13 VITALS
SYSTOLIC BLOOD PRESSURE: 142 MMHG | BODY MASS INDEX: 31.14 KG/M2 | HEART RATE: 90 BPM | WEIGHT: 235 LBS | DIASTOLIC BLOOD PRESSURE: 94 MMHG | HEIGHT: 73 IN | OXYGEN SATURATION: 98 %

## 2019-12-13 DIAGNOSIS — L02.11 CUTANEOUS ABSCESS OF NECK: Primary | ICD-10-CM

## 2019-12-13 DIAGNOSIS — I10 ESSENTIAL HYPERTENSION: ICD-10-CM

## 2019-12-13 PROCEDURE — 4040F PNEUMOC VAC/ADMIN/RCVD: CPT | Performed by: NURSE PRACTITIONER

## 2019-12-13 PROCEDURE — G8417 CALC BMI ABV UP PARAM F/U: HCPCS | Performed by: NURSE PRACTITIONER

## 2019-12-13 PROCEDURE — G8427 DOCREV CUR MEDS BY ELIG CLIN: HCPCS | Performed by: NURSE PRACTITIONER

## 2019-12-13 PROCEDURE — 3017F COLORECTAL CA SCREEN DOC REV: CPT | Performed by: NURSE PRACTITIONER

## 2019-12-13 PROCEDURE — G8484 FLU IMMUNIZE NO ADMIN: HCPCS | Performed by: NURSE PRACTITIONER

## 2019-12-13 PROCEDURE — 99213 OFFICE O/P EST LOW 20 MIN: CPT | Performed by: NURSE PRACTITIONER

## 2019-12-13 PROCEDURE — 1036F TOBACCO NON-USER: CPT | Performed by: NURSE PRACTITIONER

## 2019-12-13 PROCEDURE — 1123F ACP DISCUSS/DSCN MKR DOCD: CPT | Performed by: NURSE PRACTITIONER

## 2019-12-13 RX ORDER — LOSARTAN POTASSIUM 100 MG/1
100 TABLET ORAL DAILY
Qty: 90 TABLET | Refills: 0 | Status: SHIPPED | OUTPATIENT
Start: 2019-12-13 | End: 2020-03-12

## 2019-12-13 ASSESSMENT — ENCOUNTER SYMPTOMS
SHORTNESS OF BREATH: 0
DIARRHEA: 0
ABDOMINAL PAIN: 0
NAUSEA: 0
COUGH: 0
VOMITING: 0

## 2019-12-16 LAB
GRAM STAIN RESULT: ABNORMAL
WOUND/ABSCESS: ABNORMAL

## 2019-12-23 ENCOUNTER — OFFICE VISIT (OUTPATIENT)
Dept: SURGERY | Age: 74
End: 2019-12-23
Payer: MEDICARE

## 2019-12-23 VITALS
HEIGHT: 73 IN | BODY MASS INDEX: 31.14 KG/M2 | WEIGHT: 235 LBS | DIASTOLIC BLOOD PRESSURE: 82 MMHG | SYSTOLIC BLOOD PRESSURE: 136 MMHG

## 2019-12-23 PROCEDURE — 11421 EXC H-F-NK-SP B9+MARG 0.6-1: CPT | Performed by: SURGERY

## 2020-01-02 ENCOUNTER — OFFICE VISIT (OUTPATIENT)
Dept: SURGERY | Age: 75
End: 2020-01-02

## 2020-01-02 PROCEDURE — 99024 POSTOP FOLLOW-UP VISIT: CPT | Performed by: SURGERY

## 2020-01-02 NOTE — PROGRESS NOTES
Subjective:      Patient ID: Celestina Nino is a 69 y.o. male. HPI  Post op from cyst removal. Sutures out today. Healing well. Follow up with me as needed    Review of Systems    Objective:   Physical Exam    Assessment:       Diagnosis Orders   1.  Sebaceous cyst             Plan:      Follow up with me as needed          Ami Parmar MD

## 2020-01-02 NOTE — PROGRESS NOTES
Subjective:      Patient ID: Vita Kurtz is a 69 y.o. male. HPI  Presents for removal of enlarging cyst on right neck. Present for years with slow increase in size and now causing pain. The risks, benefits and alternatives to the planned procedure were discussed. Patient expressed an understanding and is willing to proceed. Procedure  Under sterile conditions, lidocaine infused. A 1 cm incision made on right neck and a 1 cm cystic lesion removed. Hemostasis was obtained with direct pressure and wound closed with 4-0nylons. No immediate complications noted. Dressing placed. No specimen sent. Review of Systems    Objective:   Physical Exam    Assessment:       Diagnosis Orders   1.  Sebaceous cyst  24431 - [place] EXC SKIN BENIG 0.6-1CM REMAINDR BODY           Plan:      Suture removal in 10 days        Marsha Francisco MD

## 2020-01-13 ENCOUNTER — OFFICE VISIT (OUTPATIENT)
Dept: FAMILY MEDICINE CLINIC | Age: 75
End: 2020-01-13
Payer: COMMERCIAL

## 2020-01-13 VITALS
SYSTOLIC BLOOD PRESSURE: 160 MMHG | OXYGEN SATURATION: 98 % | RESPIRATION RATE: 10 BRPM | HEART RATE: 78 BPM | WEIGHT: 235 LBS | BODY MASS INDEX: 31 KG/M2 | DIASTOLIC BLOOD PRESSURE: 80 MMHG

## 2020-01-13 DIAGNOSIS — I10 ESSENTIAL HYPERTENSION: ICD-10-CM

## 2020-01-13 LAB
ANION GAP SERPL CALCULATED.3IONS-SCNC: 17 MMOL/L (ref 3–16)
BUN BLDV-MCNC: 14 MG/DL (ref 7–20)
CALCIUM SERPL-MCNC: 9.9 MG/DL (ref 8.3–10.6)
CHLORIDE BLD-SCNC: 101 MMOL/L (ref 99–110)
CO2: 22 MMOL/L (ref 21–32)
CREAT SERPL-MCNC: 0.6 MG/DL (ref 0.8–1.3)
GFR AFRICAN AMERICAN: >60
GFR NON-AFRICAN AMERICAN: >60
GLUCOSE BLD-MCNC: 96 MG/DL (ref 70–99)
POTASSIUM SERPL-SCNC: 4.4 MMOL/L (ref 3.5–5.1)
SODIUM BLD-SCNC: 140 MMOL/L (ref 136–145)

## 2020-01-13 PROCEDURE — 99213 OFFICE O/P EST LOW 20 MIN: CPT | Performed by: NURSE PRACTITIONER

## 2020-01-13 RX ORDER — AMLODIPINE BESYLATE 5 MG/1
5 TABLET ORAL DAILY
Qty: 30 TABLET | Refills: 1 | Status: SHIPPED | OUTPATIENT
Start: 2020-01-13 | End: 2020-01-23 | Stop reason: SDUPTHER

## 2020-01-13 ASSESSMENT — ENCOUNTER SYMPTOMS
COUGH: 0
SHORTNESS OF BREATH: 0

## 2020-01-13 ASSESSMENT — PATIENT HEALTH QUESTIONNAIRE - PHQ9
SUM OF ALL RESPONSES TO PHQ QUESTIONS 1-9: 0
1. LITTLE INTEREST OR PLEASURE IN DOING THINGS: 0
SUM OF ALL RESPONSES TO PHQ QUESTIONS 1-9: 0
SUM OF ALL RESPONSES TO PHQ9 QUESTIONS 1 & 2: 0
2. FEELING DOWN, DEPRESSED OR HOPELESS: 0

## 2020-01-13 NOTE — PROGRESS NOTES
(ZYRTEC-D) 5-120 MG per extended release tablet Take 1 tablet by mouth 2 times daily      polyethylene glycol (GLYCOLAX) powder Take 17 g by mouth daily      tadalafil (CIALIS) 20 MG tablet Take 20 mg by mouth as needed for Erectile Dysfunction      hydrocortisone 1 % cream Apply topically as needed Apply topically 2 times daily.  fluticasone (FLONASE) 50 MCG/ACT nasal spray 1 spray by Nasal route daily. (Patient taking differently: 1 spray by Nasal route as needed ) 3 Bottle 3    aspirin EC 81 MG EC tablet Take 81 mg by mouth daily.  brimonidine-timolol (COMBIGAN) 0.2-0.5 % ophthalmic solution Place 1 drop into the right eye every 12 hours. No current facility-administered medications for this visit. Allergies   Allergen Reactions    Lisinopril Other (See Comments)     cough       Review of Systems   Constitutional: Negative for activity change and fever. Respiratory: Negative for cough and shortness of breath. Cardiovascular: Negative for chest pain, palpitations and leg swelling. Neurological: Negative for dizziness and headaches. Vitals:    01/13/20 1127 01/13/20 1133 01/13/20 1138   BP: (!) 148/90 (!) 160/94 (!) 160/80   Site: Right Upper Arm Left Upper Arm Right Upper Arm   Position: Sitting Sitting Sitting   Cuff Size: Medium Adult Large Adult Medium Adult   Pulse: 78     Resp: 10     SpO2: 98%     Weight: 235 lb (106.6 kg)         Body mass index is 31 kg/m². Wt Readings from Last 3 Encounters:   01/13/20 235 lb (106.6 kg)   12/23/19 235 lb (106.6 kg)   12/13/19 235 lb (106.6 kg)       BP Readings from Last 3 Encounters:   01/13/20 (!) 160/94   12/23/19 136/82   12/13/19 (!) 142/94       Physical Exam  Vitals signs and nursing note reviewed. Constitutional:       General: He is not in acute distress. Appearance: He is well-developed. HENT:      Head: Normocephalic and atraumatic. Neck:      Musculoskeletal: Neck supple.    Cardiovascular:      Rate and Rhythm: Normal rate and regular rhythm. Heart sounds: Normal heart sounds. No murmur. No friction rub. No gallop. Pulmonary:      Effort: Pulmonary effort is normal. No respiratory distress. Breath sounds: Normal breath sounds. Musculoskeletal:      Right lower leg: No edema. Left lower leg: No edema. Skin:     General: Skin is warm and dry. Neurological:      Mental Status: He is alert and oriented to person, place, and time. Psychiatric:         Behavior: Behavior normal.         Thought Content: Thought content normal.         Judgment: Judgment normal.         Assessmentand Plan  Yuriy Mccloud was seen today for hypertension. Diagnoses and all orders for this visit:    Essential hypertension: uncontrolled. Continue losartan 100 mg daily. Will recheck BMP on increased dose of losartan. Advised to start amlodipine 5 mg daily. Side effects of medication discussed. -     Basic Metabolic Panel; Future  -     amLODIPine (NORVASC) 5 MG tablet; Take 1 tablet by mouth daily    Leaving for [place] end of this month. He will come in for BP check before going to [facility]. Return in about 11 days (around 1/24/2020), or if symptoms worsen or fail to improve, for blood pressure.

## 2020-01-23 ENCOUNTER — OFFICE VISIT (OUTPATIENT)
Dept: FAMILY MEDICINE CLINIC | Age: 75
End: 2020-01-23
Payer: MEDICARE

## 2020-01-23 VITALS
WEIGHT: 236 LBS | BODY MASS INDEX: 31.14 KG/M2 | SYSTOLIC BLOOD PRESSURE: 132 MMHG | HEART RATE: 82 BPM | DIASTOLIC BLOOD PRESSURE: 74 MMHG | OXYGEN SATURATION: 98 %

## 2020-01-23 PROCEDURE — 99213 OFFICE O/P EST LOW 20 MIN: CPT | Performed by: NURSE PRACTITIONER

## 2020-01-23 RX ORDER — AMLODIPINE BESYLATE 5 MG/1
5 TABLET ORAL DAILY
Qty: 90 TABLET | Refills: 1 | Status: SHIPPED | OUTPATIENT
Start: 2020-01-23 | End: 2020-07-13

## 2020-01-23 ASSESSMENT — ENCOUNTER SYMPTOMS
VOMITING: 0
COUGH: 0
DIARRHEA: 0
SHORTNESS OF BREATH: 0
NAUSEA: 0

## 2020-01-23 NOTE — PROGRESS NOTES
1/23/2020    This is a 69 y.o. male   Chief Complaint   Patient presents with    Hypertension   . HPI  Hypertension:  Home blood pressure monitoring: Yes - 140-153/80-98. He is adherent to a low sodium diet. Patient denies chest pain, shortness of breath, headache, peripheral edema, palpitations and dry cough. Antihypertensive medication side effects: no medication side effects noted.                                         Sodium (mmol/L)   Date Value   01/13/2020 140    BUN (mg/dL)   Date Value   01/13/2020 14    Glucose (mg/dL)   Date Value   01/13/2020 96   07/13/2011 93      Potassium (mmol/L)   Date Value   01/13/2020 4.4     Potassium reflex Magnesium (mmol/L)   Date Value   08/07/2019 3.6    CREATININE (mg/dL)   Date Value   01/13/2020 0.6 (L)            Patient Active Problem List   Diagnosis    Adenocarcinoma of prostate-s/p total prostatectomy--sees dr Priya Cintron of left eye    AAA (abdominal aortic aneurysm)-(3.2 cm- lunchanged--done 10/16)--repeat 1 yr    Diverticula, colon    Chronic non-seasonal allergic rhinitis    Glaucoma-sees dr Holly Lantigua BPH w/o urinary obs/LUTS--s/p prostatectomy    Hypercholesteremia    GERD (gastroesophageal reflux disease)--on daily ppi    Colonic polyp-(last colo 3/16--pos-repeat 3-5yrs)-dr mccarty    ED (erectile dysfunction)--started on daily cialis by dr Griselda Dobbins Incisional hernia, without obstruction or gangrene    Prediabetes    Essential hypertension          Current Outpatient Medications   Medication Sig Dispense Refill    amLODIPine (NORVASC) 5 MG tablet Take 1 tablet by mouth daily 30 tablet 1    esomeprazole (NEXIUM) 20 MG delayed release capsule TAKE 1 CAPSULE BY MOUTH TWICE A  capsule 0    losartan (COZAAR) 100 MG tablet Take 1 tablet by mouth daily 90 tablet 0    atorvastatin (LIPITOR) 80 MG tablet TAKE 1 TABLET BY MOUTH EVERY DAY 90 tablet 1    cetirizine-psuedoephedrine (ZYRTEC-D) 5-120 MG per extended release tablet Take 1 tablet by mouth 2 times daily      polyethylene glycol (GLYCOLAX) powder Take 17 g by mouth daily      tadalafil (CIALIS) 20 MG tablet Take 20 mg by mouth as needed for Erectile Dysfunction      hydrocortisone 1 % cream Apply topically as needed Apply topically 2 times daily.  fluticasone (FLONASE) 50 MCG/ACT nasal spray 1 spray by Nasal route daily. (Patient taking differently: 1 spray by Nasal route as needed ) 3 Bottle 3    aspirin EC 81 MG EC tablet Take 81 mg by mouth daily.  brimonidine-timolol (COMBIGAN) 0.2-0.5 % ophthalmic solution Place 1 drop into the right eye every 12 hours. No current facility-administered medications for this visit. Allergies   Allergen Reactions    Lisinopril Other (See Comments)     cough       Review of Systems   Constitutional: Negative for activity change and fever. Respiratory: Negative for cough and shortness of breath. Cardiovascular: Negative for chest pain and leg swelling. Gastrointestinal: Negative for diarrhea, nausea and vomiting. Neurological: Negative for dizziness, syncope and headaches. Vitals:    01/23/20 0850 01/23/20 0902   BP: 138/80 132/74   Site:  Right Upper Arm   Position:  Sitting   Cuff Size:  Large Adult   Pulse: 82    SpO2: 98%    Weight: 236 lb (107 kg)    Home cuff- 139/93    Body mass index is 31.14 kg/m². Wt Readings from Last 3 Encounters:   01/23/20 236 lb (107 kg)   01/13/20 235 lb (106.6 kg)   12/23/19 235 lb (106.6 kg)       BP Readings from Last 3 Encounters:   01/23/20 138/80   01/13/20 (!) 160/80   12/23/19 136/82       Physical Exam  Vitals signs and nursing note reviewed. Constitutional:       General: He is not in acute distress. Appearance: He is well-developed. HENT:      Head: Normocephalic and atraumatic. Neck:      Musculoskeletal: Neck supple. Cardiovascular:      Rate and Rhythm: Normal rate and regular rhythm.       Heart sounds: Normal heart sounds. No murmur. No friction rub. No gallop. Pulmonary:      Effort: Pulmonary effort is normal. No respiratory distress. Breath sounds: Normal breath sounds. Musculoskeletal:      Right lower leg: No edema. Left lower leg: No edema. Skin:     General: Skin is warm and dry. Neurological:      Mental Status: He is alert and oriented to person, place, and time. Psychiatric:         Behavior: Behavior normal.         Thought Content: Thought content normal.         Judgment: Judgment normal.         Assessmentand Plan  Juan Pablo Read was seen today for hypertension. Diagnoses and all orders for this visit:    Essential hypertension: BP has improved. Continue   -     amLODIPine (NORVASC) 5 MG tablet; Take 1 tablet by mouth daily  Losartan 100 mg daily. Continue to monitor BP at home- home BP cuff is higher than office cuff. Continue to work on diet, aerobic exercise, and weight loss. Return in about 8 weeks (around 3/19/2020), or if symptoms worsen or fail to improve, for blood pressure.

## 2020-03-12 RX ORDER — ATORVASTATIN CALCIUM 80 MG/1
TABLET, FILM COATED ORAL
Qty: 90 TABLET | Refills: 1 | Status: SHIPPED | OUTPATIENT
Start: 2020-03-12 | End: 2020-09-15

## 2020-03-12 RX ORDER — LOSARTAN POTASSIUM 100 MG/1
TABLET ORAL
Qty: 90 TABLET | Refills: 0 | Status: SHIPPED | OUTPATIENT
Start: 2020-03-12 | End: 2020-04-20 | Stop reason: SDUPTHER

## 2020-03-19 RX ORDER — LOSARTAN POTASSIUM 100 MG/1
TABLET ORAL
Qty: 90 TABLET | Refills: 0 | Status: CANCELLED | OUTPATIENT
Start: 2020-03-19

## 2020-03-19 RX ORDER — ATORVASTATIN CALCIUM 80 MG/1
TABLET, FILM COATED ORAL
Qty: 90 TABLET | Refills: 1 | Status: CANCELLED | OUTPATIENT
Start: 2020-03-19

## 2020-03-19 RX ORDER — ESOMEPRAZOLE MAGNESIUM 40 MG/1
40 CAPSULE, DELAYED RELEASE ORAL
Qty: 90 CAPSULE | Refills: 1 | Status: CANCELLED | OUTPATIENT
Start: 2020-03-19

## 2020-04-20 ENCOUNTER — TELEPHONE (OUTPATIENT)
Dept: FAMILY MEDICINE CLINIC | Age: 75
End: 2020-04-20

## 2020-04-20 ENCOUNTER — TELEMEDICINE (OUTPATIENT)
Dept: FAMILY MEDICINE CLINIC | Age: 75
End: 2020-04-20
Payer: MEDICARE

## 2020-04-20 VITALS
WEIGHT: 232 LBS | DIASTOLIC BLOOD PRESSURE: 85 MMHG | HEIGHT: 73 IN | HEART RATE: 80 BPM | SYSTOLIC BLOOD PRESSURE: 122 MMHG | BODY MASS INDEX: 30.75 KG/M2

## 2020-04-20 PROCEDURE — 99213 OFFICE O/P EST LOW 20 MIN: CPT | Performed by: NURSE PRACTITIONER

## 2020-04-20 RX ORDER — LOSARTAN POTASSIUM 100 MG/1
TABLET ORAL
Qty: 90 TABLET | Refills: 1 | Status: SHIPPED | OUTPATIENT
Start: 2020-04-20 | End: 2020-10-06

## 2020-04-20 RX ORDER — ESOMEPRAZOLE MAGNESIUM 40 MG/1
40 CAPSULE, DELAYED RELEASE ORAL
Qty: 90 CAPSULE | Refills: 1 | Status: SHIPPED | OUTPATIENT
Start: 2020-04-20 | End: 2020-07-27 | Stop reason: SDUPTHER

## 2020-04-20 ASSESSMENT — ENCOUNTER SYMPTOMS
COUGH: 0
SHORTNESS OF BREATH: 0

## 2020-04-20 NOTE — PROGRESS NOTES
Normocephalic, atraumatic. [] Abnormal   [x] Mouth/Throat: Mucous membranes are moist.     External Ears [x] Normal  [] Abnormal-     Neck: [x] No visualized mass     Pulmonary/Chest: [x] Respiratory effort normal.  [x] No visualized signs of difficulty breathing or respiratory distress        [] Abnormal-      Musculoskeletal:   [x] Normal gait with no signs of ataxia         [x] Normal range of motion of neck        [] Abnormal-       Neurological:        [x] No Facial Asymmetry (Cranial nerve 7 motor function) (limited exam to video visit)          [x] No gaze palsy        [] Abnormal-         Skin:        [x] No significant exanthematous lesions or discoloration noted on facial skin         [] Abnormal-            Psychiatric:       [x] Normal Affect [x] No Hallucinations        [] Abnormal-     Other pertinent observable physical exam findings-     ASSESSMENT/PLAN:  1. Essential hypertension  Controlled   Continue   - losartan (COZAAR) 100 MG tablet; TAKE 1 TABLET BY MOUTH EVERY DAY  Dispense: 90 tablet; Refill: 1  - amlodipine 5 mg daily  - Continue with aerobic exercise daily   - Low salt diet discussed  - Continue to monitor BP at home and call if consistently above 140/90.    2. Gastroesophageal reflux disease, esophagitis presence not specified  Controlled   - esomeprazole (NEXIUM) 40 MG delayed release capsule; Take 1 capsule by mouth every morning (before breakfast)  Dispense: 90 capsule; Refill: 1      Return in about 3 months (around 7/20/2020), or if symptoms worsen or fail to improve, for chronic conditions. Theron Baer is a 69 y.o. male being evaluated by a Virtual Visit (video visit) encounter to address concerns as mentioned above. A caregiver was present when appropriate. Due to this being a TeleHealth encounter (During FBKWO-09 public health emergency), evaluation of the following organ systems was limited: Vitals/Constitutional/EENT/Resp/CV/GI//MS/Neuro/Skin/Heme-Lymph-Imm. Pursuant to the emergency declaration under the [facility], [address] [place] [address] authority and the [employer] and [employer] Act, this Virtual Visit was conducted with patient's (and/or legal guardian's) consent, to reduce the patient's risk of exposure to COVID-19 and provide necessary medical care. The patient (and/or legal guardian) has also been advised to contact this office for worsening conditions or problems, and seek emergency medical treatment and/or call 911 if deemed necessary. Services were provided through a video synchronous discussion virtually to substitute for in-person clinic visit. Patient and provider were located at their individual homes. --Isabel Amos, ROSAMARIA - CNP on 4/20/2020 at 10:02 AM    An electronic signature was used to authenticate this note.

## 2020-06-08 ENCOUNTER — OFFICE VISIT (OUTPATIENT)
Dept: SURGERY | Age: 75
End: 2020-06-08

## 2020-06-08 VITALS
BODY MASS INDEX: 30.61 KG/M2 | WEIGHT: 232 LBS | DIASTOLIC BLOOD PRESSURE: 96 MMHG | HEART RATE: 86 BPM | SYSTOLIC BLOOD PRESSURE: 151 MMHG

## 2020-06-08 PROCEDURE — 99999 PR OFFICE/OUTPT VISIT,PROCEDURE ONLY: CPT | Performed by: SURGERY

## 2020-06-08 NOTE — PROGRESS NOTES
Subjective:      Patient ID: Jean Pierre Romero is a 69 y.o. male. HPI  Known from prior removal of a sebaceous cyst on posterior neck presents for evaluation of two new cysts. Located near previous removal. Present a few months. No significant pain but seem to be getting bigger. Plan removal in office. Review of Systems    Objective:   Physical Exam    Assessment:       Diagnosis Orders   1. Sebaceous cyst               Plan:      Removal.     The risks, benefits and alternatives to the planned procedure were discussed. Patient expressed an understanding and is willing to proceed.             Vitkoriya Bae MD

## 2020-06-15 ENCOUNTER — OFFICE VISIT (OUTPATIENT)
Dept: SURGERY | Age: 75
End: 2020-06-15
Payer: MEDICARE

## 2020-06-15 PROCEDURE — 11421 EXC H-F-NK-SP B9+MARG 0.6-1: CPT | Performed by: SURGERY

## 2020-06-29 ENCOUNTER — OFFICE VISIT (OUTPATIENT)
Dept: SURGERY | Age: 75
End: 2020-06-29

## 2020-06-29 PROCEDURE — 99024 POSTOP FOLLOW-UP VISIT: CPT | Performed by: SURGERY

## 2020-07-13 RX ORDER — AMLODIPINE BESYLATE 5 MG/1
TABLET ORAL
Qty: 90 TABLET | Refills: 1 | Status: SHIPPED | OUTPATIENT
Start: 2020-07-13 | End: 2020-07-27 | Stop reason: SDUPTHER

## 2020-07-27 ENCOUNTER — TELEMEDICINE (OUTPATIENT)
Dept: FAMILY MEDICINE CLINIC | Age: 75
End: 2020-07-27
Payer: MEDICARE

## 2020-07-27 PROCEDURE — 99214 OFFICE O/P EST MOD 30 MIN: CPT | Performed by: NURSE PRACTITIONER

## 2020-07-27 RX ORDER — AMLODIPINE BESYLATE 5 MG/1
TABLET ORAL
Qty: 90 TABLET | Refills: 2 | Status: SHIPPED | OUTPATIENT
Start: 2020-07-27 | End: 2021-03-26 | Stop reason: SDUPTHER

## 2020-07-27 RX ORDER — ESOMEPRAZOLE MAGNESIUM 40 MG/1
40 CAPSULE, DELAYED RELEASE ORAL
Qty: 90 CAPSULE | Refills: 2 | Status: SHIPPED | OUTPATIENT
Start: 2020-07-27 | End: 2021-03-26 | Stop reason: SDUPTHER

## 2020-07-27 ASSESSMENT — ENCOUNTER SYMPTOMS
DIARRHEA: 0
SHORTNESS OF BREATH: 0
NAUSEA: 0
CHEST TIGHTNESS: 0
CONSTIPATION: 0
COUGH: 0
VOMITING: 0

## 2020-07-27 NOTE — PROGRESS NOTES
2020    This is a 69 y.o. male   Chief Complaint   Patient presents with    Hypertension     f/u   . HPI  Chapito Blanchard is a 69 y.o. male here to f/u on HTN, HLD, prediabetes, aaa, gerd, chuckie. Chronic GERD- stable on daily PPI. Reports that he has rare heartburn. He has not been able to decrease his dose in the past due to increase in cough. AAA--ultrasound done in 2019 --no change --no sx    CHUCKIE--daily flonase and zyrtec-d     Glaucoma--seeing eye dr for this--borderline pressures per pt  Gordon polyps--done 3/16    ED--stable on cialis prn--gets from urology  Prostate ca-s/p prostatectomy-seeing dr Keaton Montes recently per pt    Hypertension:  Home blood pressure monitorin/89. He is adherent to a low sodium diet. Patient denies chest pain, shortness of breath, peripheral edema, palpitations and dry cough. Antihypertensive medication side effects: no medication side effects noted. Had dry cough on lisinopril. Cough resolved on losartan. Hyperlipidemia:  No new myalgias or GI upset on atorvastatin (Lipitor).        Prediabetes: diet- none; exercise- walking 2 miles per day     Lab Results   Component Value Date    LABA1C 5.6 2019    LABA1C 5.6 2018    LABA1C 5.4 2018     Lab Results   Component Value Date    LABMICR YES 2019    CREATININE 0.6 (L) 2020     Lab Results   Component Value Date    ALT 34 2019    AST 34 2019     Lab Results   Component Value Date    CHOL 142 2019    TRIG 82 2019    HDL 37 (L) 2019    LDLCALC 89 2019           Patient Active Problem List   Diagnosis    History of adenocarcinoma of prostate    Blindness of left eye    AAA (abdominal aortic aneurysm)-(3.2 cm- lunchanged--done 10/16)--repeat 1 yr    Diverticula, colon    Chronic non-seasonal allergic rhinitis    Glaucoma-sees dr Arpit Hutchinson BPH w/o urinary obs/LUTS--s/p prostatectomy    Hypercholesteremia    GERD (gastroesophageal reflux disease)--on daily ppi    Colonic polyp-(last colo 3/16--pos-repeat 3-5yrs)-dr mccarty    ED (erectile dysfunction)--started on daily cialis by dr Justice Coyle    Incisional hernia, without obstruction or gangrene    Prediabetes    Essential hypertension       Current Outpatient Medications   Medication Sig Dispense Refill    amLODIPine (NORVASC) 5 MG tablet TAKE 1 TABLET BY MOUTH EVERY DAY 90 tablet 1    losartan (COZAAR) 100 MG tablet TAKE 1 TABLET BY MOUTH EVERY DAY 90 tablet 1    esomeprazole (NEXIUM) 20 MG delayed release capsule TAKE 1 CAPSULE BY MOUTH TWICE A DAY (Patient taking differently: 40 mg TAKE 1 CAPSULE BY MOUTH TWICE A DAY) 180 capsule 0    atorvastatin (LIPITOR) 80 MG tablet TAKE 1 TABLET BY MOUTH EVERY DAY 90 tablet 1    cetirizine-psuedoephedrine (ZYRTEC-D) 5-120 MG per extended release tablet Take 1 tablet by mouth 2 times daily      polyethylene glycol (GLYCOLAX) powder Take 17 g by mouth daily      tadalafil (CIALIS) 20 MG tablet Take 20 mg by mouth as needed for Erectile Dysfunction      hydrocortisone 1 % cream Apply topically as needed Apply topically 2 times daily.  fluticasone (FLONASE) 50 MCG/ACT nasal spray 1 spray by Nasal route daily. (Patient taking differently: 1 spray by Nasal route as needed ) 3 Bottle 3    aspirin EC 81 MG EC tablet Take 81 mg by mouth daily.  brimonidine-timolol (COMBIGAN) 0.2-0.5 % ophthalmic solution Place 1 drop into the right eye every 12 hours. No current facility-administered medications for this visit. Allergies   Allergen Reactions    Lisinopril Other (See Comments)     cough       Review of Systems   Constitutional: Negative for activity change, fatigue and fever. Respiratory: Negative for cough, chest tightness and shortness of breath. Cardiovascular: Negative for chest pain and leg swelling.    Gastrointestinal: Negative for constipation, diarrhea, nausea exercise, and weight loss discussed in needed. Chronic non-seasonal allergic rhinitis, unspecified trigger  Stable. Continue flonase and zyrtec. Return in about 8 months (around 3/27/2021), or if symptoms worsen or fail to improve, for chronic conditions. Kev Zapata is a 69 y.o. male being evaluated by a Virtual Visit (video visit) encounter to address concerns as mentioned above. A caregiver was present when appropriate. Due to this being a TeleHealth encounter (During [place] [address] public health emergency), evaluation of the following organ systems was limited: Vitals/Constitutional/EENT/Resp/CV/GI//MS/Neuro/Skin/Heme-Lymph-Imm. Pursuant to the emergency declaration under the [address] [place] [address] authority and the [employer] and [employer] Act, this Virtual Visit was conducted with patient's (and/or legal guardian's) consent, to reduce the patient's risk of exposure to COVID-19 and provide necessary medical care. The patient (and/or legal guardian) has also been advised to contact this office for worsening conditions or problems, and seek emergency medical treatment and/or call 911 if deemed necessary. Patient identification was verified at the start of the visit: Yes    Total time spent for this encounter: Not billed by time    Services were provided through a video synchronous discussion virtually to substitute for in-person clinic visit. Patient and provider were located at their individual homes. --ROSAMARIA Restrepo - CNP on 7/27/2020 at 9:21 AM    An electronic signature was used to authenticate this note.

## 2020-10-06 RX ORDER — LOSARTAN POTASSIUM 100 MG/1
TABLET ORAL
Qty: 90 TABLET | Refills: 1 | Status: SHIPPED | OUTPATIENT
Start: 2020-10-06 | End: 2021-04-30

## 2020-12-08 ENCOUNTER — HOSPITAL ENCOUNTER (OUTPATIENT)
Dept: ULTRASOUND IMAGING | Age: 75
Discharge: HOME | End: 2020-12-08
Payer: MEDICARE

## 2020-12-08 PROCEDURE — 76775 US EXAM ABDO BACK WALL LIM: CPT

## 2021-02-18 ENCOUNTER — TELEPHONE (OUTPATIENT)
Dept: FAMILY MEDICINE CLINIC | Age: 76
End: 2021-02-18

## 2021-02-18 NOTE — TELEPHONE ENCOUNTER
----- Message from Tomás Alarcon sent at 2/18/2021 11:43 AM EST -----  Subject: Message to Provider    QUESTIONS  Information for Provider? Patient called in and needs a appointment for a   wellness visit and it would not let me schedule the appointment . Wife and    would like to have appointment back to back sometime end of March.  ---------------------------------------------------------------------------  --------------  [address]  What is the best way for the office to contact you? OK to leave message on   voicemail  Preferred Call Back Phone Number? [phone]  ---------------------------------------------------------------------------  --------------  SCRIPT ANSWERS  Relationship to Patient?  Self

## 2021-03-04 ENCOUNTER — IMMUNIZATION (OUTPATIENT)
Dept: PRIMARY CARE CLINIC | Age: 76
End: 2021-03-04
Payer: MEDICARE

## 2021-03-04 PROCEDURE — 91301 COVID-19, MODERNA VACCINE 100MCG/0.5ML DOSE: CPT | Performed by: FAMILY MEDICINE

## 2021-03-04 PROCEDURE — 0011A COVID-19, MODERNA VACCINE 100MCG/0.5ML DOSE: CPT | Performed by: FAMILY MEDICINE

## 2021-03-08 RX ORDER — ATORVASTATIN CALCIUM 80 MG/1
TABLET, FILM COATED ORAL
Qty: 90 TABLET | Refills: 1 | Status: SHIPPED | OUTPATIENT
Start: 2021-03-08 | End: 2021-08-17

## 2021-03-26 ENCOUNTER — OFFICE VISIT (OUTPATIENT)
Dept: FAMILY MEDICINE CLINIC | Age: 76
End: 2021-03-26
Payer: MEDICARE

## 2021-03-26 VITALS
HEART RATE: 77 BPM | DIASTOLIC BLOOD PRESSURE: 82 MMHG | TEMPERATURE: 97 F | BODY MASS INDEX: 30.08 KG/M2 | WEIGHT: 228 LBS | SYSTOLIC BLOOD PRESSURE: 132 MMHG | OXYGEN SATURATION: 96 %

## 2021-03-26 DIAGNOSIS — R73.03 PREDIABETES: ICD-10-CM

## 2021-03-26 DIAGNOSIS — I10 ESSENTIAL HYPERTENSION: ICD-10-CM

## 2021-03-26 DIAGNOSIS — E78.00 HYPERCHOLESTEREMIA: Primary | ICD-10-CM

## 2021-03-26 DIAGNOSIS — K21.9 GASTROESOPHAGEAL REFLUX DISEASE, UNSPECIFIED WHETHER ESOPHAGITIS PRESENT: ICD-10-CM

## 2021-03-26 DIAGNOSIS — K21.9 GASTROESOPHAGEAL REFLUX DISEASE: ICD-10-CM

## 2021-03-26 DIAGNOSIS — I71.40 ABDOMINAL AORTIC ANEURYSM (AAA) WITHOUT RUPTURE: ICD-10-CM

## 2021-03-26 DIAGNOSIS — Z13.29 THYROID DISORDER SCREENING: ICD-10-CM

## 2021-03-26 PROCEDURE — 99214 OFFICE O/P EST MOD 30 MIN: CPT | Performed by: NURSE PRACTITIONER

## 2021-03-26 RX ORDER — AMLODIPINE BESYLATE 5 MG/1
TABLET ORAL
Qty: 90 TABLET | Refills: 1 | Status: SHIPPED | OUTPATIENT
Start: 2021-03-26 | End: 2021-12-09

## 2021-03-26 RX ORDER — ESOMEPRAZOLE MAGNESIUM 40 MG/1
40 CAPSULE, DELAYED RELEASE ORAL
Qty: 90 CAPSULE | Refills: 1 | Status: SHIPPED | OUTPATIENT
Start: 2021-03-26 | End: 2021-09-29

## 2021-03-26 ASSESSMENT — ENCOUNTER SYMPTOMS
VOMITING: 0
DIARRHEA: 0
CHEST TIGHTNESS: 0
NAUSEA: 0
SHORTNESS OF BREATH: 0
COUGH: 0
CONSTIPATION: 0

## 2021-03-26 ASSESSMENT — PATIENT HEALTH QUESTIONNAIRE - PHQ9
2. FEELING DOWN, DEPRESSED OR HOPELESS: 0
SUM OF ALL RESPONSES TO PHQ QUESTIONS 1-9: 0

## 2021-03-26 NOTE — PROGRESS NOTES
3/26/2021    This is a 77 y.o. male   Chief Complaint   Patient presents with    Hypertension   . ISSA Rice is a 77 y.o. male here to f/u on HTN, HLD, prediabetes, aaa, gerd, chuckie. Chronic GERD- stable on daily PPI. Sometimes he will need to take BID. AAA--ultrasound done in Dec 2020 --no change --no sx    CHUCKIE--daily flonase and zyrtec-d     Glaucoma--seeing eye dr for this--borderline pressures per pt  [place] polyps--done 3/16- recall 5 years     ED--stable on cialis prn--gets from urology    Prostate ca-s/p prostatectomy-seeing dr Angie Gillette recently per pt. Last PSA level was normal in Nov 2020    Hypertension:  Home blood pressure monitoring: No.  He is adherent to a low sodium diet. Patient denies chest pain, shortness of breath, peripheral edema, palpitations and dry cough. Antihypertensive medication side effects: no medication side effects noted. Had dry cough on lisinopril. Cough resolved on losartan. Hyperlipidemia:  No new myalgias or GI upset on atorvastatin (Lipitor).        Prediabetes: diet- none; exercise- tries to stay active     Lab Results   Component Value Date    LABA1C 5.6 09/20/2019    LABA1C 5.6 12/19/2018    LABA1C 5.4 05/08/2018     Lab Results   Component Value Date    LABMICR YES 08/07/2019    CREATININE 0.6 (L) 01/13/2020     Lab Results   Component Value Date    ALT 34 09/20/2019    AST 34 09/20/2019     Lab Results   Component Value Date    CHOL 142 09/20/2019    TRIG 82 09/20/2019    HDL 37 (L) 09/20/2019    LDLCALC 89 09/20/2019           Patient Active Problem List   Diagnosis    History of adenocarcinoma of prostate    Blindness of left eye    AAA (abdominal aortic aneurysm)- 3.9cm 12/2020, repeat 2 yr    Diverticula, colon    Chronic non-seasonal allergic rhinitis    Glaucoma-sees dr Von Beard BPH w/o urinary obs/LUTS--s/p prostatectomy    Hypercholesteremia    GERD (gastroesophageal reflux disease)--on daily ppi    Colonic polyp-(last colo 3/16--pos-repeat 3-5yrs)-dr mccarty    ED (erectile dysfunction)--started on daily cialis by dr Cierra Haider    Incisional hernia, without obstruction or gangrene    Prediabetes    Essential hypertension       Current Outpatient Medications   Medication Sig Dispense Refill    atorvastatin (LIPITOR) 80 MG tablet TAKE 1 TABLET BY MOUTH EVERY DAY 90 tablet 1    losartan (COZAAR) 100 MG tablet TAKE 1 TABLET BY MOUTH EVERY DAY 90 tablet 1    esomeprazole (NEXIUM) 40 MG delayed release capsule Take 1 capsule by mouth every morning (before breakfast) TAKE 1 CAPSULE BY MOUTH TWICE A DAY (Patient taking differently: Take 40 mg by mouth every morning (before breakfast) ) 90 capsule 2    amLODIPine (NORVASC) 5 MG tablet TAKE 1 TABLET BY MOUTH EVERY DAY 90 tablet 2    cetirizine-psuedoephedrine (ZYRTEC-D) 5-120 MG per extended release tablet Take 1 tablet by mouth 2 times daily      polyethylene glycol (GLYCOLAX) powder Take 17 g by mouth daily      tadalafil (CIALIS) 20 MG tablet Take 20 mg by mouth as needed for Erectile Dysfunction      hydrocortisone 1 % cream Apply topically as needed Apply topically 2 times daily.  fluticasone (FLONASE) 50 MCG/ACT nasal spray 1 spray by Nasal route daily. (Patient taking differently: 1 spray by Nasal route as needed ) 3 Bottle 3    aspirin EC 81 MG EC tablet Take 81 mg by mouth daily.  brimonidine-timolol (COMBIGAN) 0.2-0.5 % ophthalmic solution Place 1 drop into the right eye every 12 hours. No current facility-administered medications for this visit. Allergies   Allergen Reactions    Lisinopril Other (See Comments)     cough       Review of Systems   Constitutional: Negative for activity change, fatigue and fever. Respiratory: Negative for cough, chest tightness and shortness of breath. Cardiovascular: Negative for chest pain and leg swelling.    Gastrointestinal: Negative for constipation, diarrhea, nausea and vomiting. Neurological: Negative for dizziness, syncope, weakness and headaches. Psychiatric/Behavioral: Negative for confusion. Vitals:    03/26/21 1143   BP: 132/82   Pulse: 77   Temp: 97 °F (36.1 °C)   SpO2: 96%   Weight: 228 lb (103.4 kg)       Body mass index is 30.08 kg/m². Wt Readings from Last 3 Encounters:   03/26/21 228 lb (103.4 kg)   06/08/20 232 lb (105.2 kg)   04/20/20 232 lb (105.2 kg)       BP Readings from Last 3 Encounters:   03/26/21 132/82   06/08/20 (!) 151/96   04/20/20 122/85       Physical Exam   Constitutional: He is oriented to person, place, and time. He appears well-developed and well-nourished. No distress. HENT:   Head: Normocephalic and atraumatic. Right Ear: Tympanic membrane, external ear and ear canal normal.   Left Ear: Tympanic membrane, external ear and ear canal normal.   Mouth/Throat: Uvula is midline, oropharynx is clear and moist and mucous membranes are normal.   Eyes: EOM are normal.   Neck: Neck supple. Carotid bruit is not present. Cardiovascular: Normal rate, regular rhythm and normal heart sounds. Exam reveals no gallop and no friction rub. No murmur heard. Pulmonary/Chest: Effort normal and breath sounds normal. No respiratory distress. Musculoskeletal:         General: No edema. Right ankle: He exhibits normal pulse. Left ankle: He exhibits normal pulse. Neurological: He is alert and oriented to person, place, and time. Skin: Skin is warm and dry. Psychiatric: He has a normal mood and affect. His behavior is normal. Judgment and thought content normal.   Nursing note and vitals reviewed. Assessment and Plan  Claudeen Reedy was seen today for check-up. Diagnoses and all orders for this visit:    Hypercholesteremia  -     Comprehensive Metabolic Panel; Future  -     Lipid Panel; Future  On atorvastatin 80 mg daily. Diet and weight loss discussed and needed.     Prediabetes  -     Hemoglobin A1C; Future  Low-carb diet, aerobic exercise, and weight loss discussed in needed. Gastroesophageal reflux disease, esophagitis presence not specified: Stable on Nexium 20 mg daily. He has not been able to wean off medication in the past due to recurrent symptoms. Advised to avoid foods that cause reflux.   -     Magnesium; Future  -     CBC Auto Differential; Future    Abdominal aortic aneurysm (AAA) without rupture (HCC)  Asymptomatic. Recheck ultrasound Dec 2021. Essential hypertension: controlled on losartan 100 mg daily and amlodipine 5 mg daily. Low-salt diet, aerobic exercise, and weight loss discussed in needed. Chronic non-seasonal allergic rhinitis, unspecified trigger  Stable. Continue flonase and zyrtec. Thyroid disorder screening  -     TSH with Reflex; Future    Return in about 6 months (around 9/26/2021), or if symptoms worsen or fail to improve, for chronic conditions.

## 2021-03-29 DIAGNOSIS — I10 ESSENTIAL HYPERTENSION: ICD-10-CM

## 2021-03-29 DIAGNOSIS — Z13.29 THYROID DISORDER SCREENING: ICD-10-CM

## 2021-03-29 DIAGNOSIS — K21.9 GASTROESOPHAGEAL REFLUX DISEASE, UNSPECIFIED WHETHER ESOPHAGITIS PRESENT: ICD-10-CM

## 2021-03-29 DIAGNOSIS — R73.03 PREDIABETES: ICD-10-CM

## 2021-03-29 DIAGNOSIS — E78.00 HYPERCHOLESTEREMIA: ICD-10-CM

## 2021-03-29 LAB
A/G RATIO: 1.4 (ref 1.1–2.2)
ALBUMIN SERPL-MCNC: 4.2 G/DL (ref 3.4–5)
ALP BLD-CCNC: 77 U/L (ref 40–129)
ALT SERPL-CCNC: 26 U/L (ref 10–40)
ANION GAP SERPL CALCULATED.3IONS-SCNC: 10 MMOL/L (ref 3–16)
AST SERPL-CCNC: 30 U/L (ref 15–37)
BASOPHILS ABSOLUTE: 0 K/UL (ref 0–0.2)
BASOPHILS RELATIVE PERCENT: 0.7 %
BILIRUB SERPL-MCNC: 0.3 MG/DL (ref 0–1)
BUN BLDV-MCNC: 13 MG/DL (ref 7–20)
CALCIUM SERPL-MCNC: 9.5 MG/DL (ref 8.3–10.6)
CHLORIDE BLD-SCNC: 106 MMOL/L (ref 99–110)
CHOLESTEROL, FASTING: 202 MG/DL (ref 0–199)
CO2: 26 MMOL/L (ref 21–32)
CREAT SERPL-MCNC: 0.7 MG/DL (ref 0.8–1.3)
EOSINOPHILS ABSOLUTE: 0.2 K/UL (ref 0–0.6)
EOSINOPHILS RELATIVE PERCENT: 4.2 %
GFR AFRICAN AMERICAN: >60
GFR NON-AFRICAN AMERICAN: >60
GLOBULIN: 3.1 G/DL
GLUCOSE FASTING: 89 MG/DL (ref 70–99)
HCT VFR BLD CALC: 44.7 % (ref 40.5–52.5)
HDLC SERPL-MCNC: 54 MG/DL (ref 40–60)
HEMOGLOBIN: 15.3 G/DL (ref 13.5–17.5)
LDL CHOLESTEROL CALCULATED: 129 MG/DL
LYMPHOCYTES ABSOLUTE: 1.4 K/UL (ref 1–5.1)
LYMPHOCYTES RELATIVE PERCENT: 32.1 %
MAGNESIUM: 2.1 MG/DL (ref 1.8–2.4)
MCH RBC QN AUTO: 34.8 PG (ref 26–34)
MCHC RBC AUTO-ENTMCNC: 34.2 G/DL (ref 31–36)
MCV RBC AUTO: 101.7 FL (ref 80–100)
MONOCYTES ABSOLUTE: 0.4 K/UL (ref 0–1.3)
MONOCYTES RELATIVE PERCENT: 10.1 %
NEUTROPHILS ABSOLUTE: 2.3 K/UL (ref 1.7–7.7)
NEUTROPHILS RELATIVE PERCENT: 52.9 %
PDW BLD-RTO: 13.5 % (ref 12.4–15.4)
PLATELET # BLD: 195 K/UL (ref 135–450)
PMV BLD AUTO: 9.2 FL (ref 5–10.5)
POTASSIUM SERPL-SCNC: 4.3 MMOL/L (ref 3.5–5.1)
RBC # BLD: 4.4 M/UL (ref 4.2–5.9)
SODIUM BLD-SCNC: 142 MMOL/L (ref 136–145)
TOTAL PROTEIN: 7.3 G/DL (ref 6.4–8.2)
TRIGLYCERIDE, FASTING: 95 MG/DL (ref 0–150)
TSH REFLEX: 2.3 UIU/ML (ref 0.27–4.2)
VLDLC SERPL CALC-MCNC: 19 MG/DL
WBC # BLD: 4.4 K/UL (ref 4–11)

## 2021-03-30 LAB
ESTIMATED AVERAGE GLUCOSE: 116.9 MG/DL
HBA1C MFR BLD: 5.7 %

## 2021-04-01 ENCOUNTER — IMMUNIZATION (OUTPATIENT)
Dept: PRIMARY CARE CLINIC | Age: 76
End: 2021-04-01
Payer: MEDICARE

## 2021-04-01 PROCEDURE — 91301 COVID-19, MODERNA VACCINE 100MCG/0.5ML DOSE: CPT | Performed by: FAMILY MEDICINE

## 2021-04-01 PROCEDURE — 0012A COVID-19, MODERNA VACCINE 100MCG/0.5ML DOSE: CPT | Performed by: FAMILY MEDICINE

## 2021-04-29 DIAGNOSIS — I10 ESSENTIAL HYPERTENSION: ICD-10-CM

## 2021-04-30 RX ORDER — LOSARTAN POTASSIUM 100 MG/1
TABLET ORAL
Qty: 90 TABLET | Refills: 1 | Status: SHIPPED | OUTPATIENT
Start: 2021-04-30 | End: 2021-09-29

## 2021-06-07 ENCOUNTER — TELEMEDICINE (OUTPATIENT)
Dept: FAMILY MEDICINE CLINIC | Age: 76
End: 2021-06-07
Payer: MEDICARE

## 2021-06-07 DIAGNOSIS — Z00.00 ROUTINE GENERAL MEDICAL EXAMINATION AT A HEALTH CARE FACILITY: Primary | ICD-10-CM

## 2021-06-07 PROCEDURE — G0438 PPPS, INITIAL VISIT: HCPCS | Performed by: NURSE PRACTITIONER

## 2021-06-07 ASSESSMENT — LIFESTYLE VARIABLES
HOW OFTEN DO YOU HAVE SIX OR MORE DRINKS ON ONE OCCASION: 0
AUDIT-C TOTAL SCORE: 4
AUDIT-C TOTAL SCORE: 0
HOW OFTEN DURING THE LAST YEAR HAVE YOU HAD A FEELING OF GUILT OR REMORSE AFTER DRINKING: 0
HAS A RELATIVE, FRIEND, DOCTOR, OR ANOTHER HEALTH PROFESSIONAL EXPRESSED CONCERN ABOUT YOUR DRINKING OR SUGGESTED YOU CUT DOWN: NO
HOW OFTEN DURING THE LAST YEAR HAVE YOU BEEN UNABLE TO REMEMBER WHAT HAPPENED THE NIGHT BEFORE BECAUSE YOU HAD BEEN DRINKING: NEVER
HOW OFTEN DO YOU HAVE SIX OR MORE DRINKS ON ONE OCCASION: NEVER
HOW OFTEN DURING THE LAST YEAR HAVE YOU FOUND THAT YOU WERE NOT ABLE TO STOP DRINKING ONCE YOU HAD STARTED: NEVER
HOW OFTEN DURING THE LAST YEAR HAVE YOU BEEN UNABLE TO REMEMBER WHAT HAPPENED THE NIGHT BEFORE BECAUSE YOU HAD BEEN DRINKING: 0
HOW OFTEN DURING THE LAST YEAR HAVE YOU NEEDED AN ALCOHOLIC DRINK FIRST THING IN THE MORNING TO GET YOURSELF GOING AFTER A NIGHT OF HEAVY DRINKING: 0
HAVE YOU OR SOMEONE ELSE BEEN INJURED AS A RESULT OF YOUR DRINKING: NO
HOW OFTEN DO YOU HAVE A DRINK CONTAINING ALCOHOL: 4
HOW OFTEN DO YOU HAVE A DRINK CONTAINING ALCOHOL: FOUR OR MORE TIMES A WEEK
AUDIT TOTAL SCORE: 0
HOW OFTEN DURING THE LAST YEAR HAVE YOU FAILED TO DO WHAT WAS NORMALLY EXPECTED FROM YOU BECAUSE OF DRINKING: 0
AUDIT TOTAL SCORE: 4
HAS A RELATIVE, FRIEND, DOCTOR, OR ANOTHER HEALTH PROFESSIONAL EXPRESSED CONCERN ABOUT YOUR DRINKING OR SUGGESTED YOU CUT DOWN: 0
HOW OFTEN DURING THE LAST YEAR HAVE YOU FAILED TO DO WHAT WAS NORMALLY EXPECTED FROM YOU BECAUSE OF DRINKING: NEVER
HOW OFTEN DURING THE LAST YEAR HAVE YOU FOUND THAT YOU WERE NOT ABLE TO STOP DRINKING ONCE YOU HAD STARTED: 0
HOW MANY STANDARD DRINKS CONTAINING ALCOHOL DO YOU HAVE ON A TYPICAL DAY: 0
HOW OFTEN DURING THE LAST YEAR HAVE YOU HAD A FEELING OF GUILT OR REMORSE AFTER DRINKING: NEVER
HAVE YOU OR SOMEONE ELSE BEEN INJURED AS A RESULT OF YOUR DRINKING: 0
HOW MANY STANDARD DRINKS CONTAINING ALCOHOL DO YOU HAVE ON A TYPICAL DAY: ONE OR TWO
HOW OFTEN DURING THE LAST YEAR HAVE YOU NEEDED AN ALCOHOLIC DRINK FIRST THING IN THE MORNING TO GET YOURSELF GOING AFTER A NIGHT OF HEAVY DRINKING: NEVER

## 2021-06-07 ASSESSMENT — PATIENT HEALTH QUESTIONNAIRE - PHQ9
1. LITTLE INTEREST OR PLEASURE IN DOING THINGS: 0
2. FEELING DOWN, DEPRESSED OR HOPELESS: 0
SUM OF ALL RESPONSES TO PHQ QUESTIONS 1-9: 0
SUM OF ALL RESPONSES TO PHQ9 QUESTIONS 1 & 2: 0

## 2021-06-07 NOTE — PATIENT INSTRUCTIONS
Personalized Preventive Plan for Naresh Umana - 6/7/2021  Medicare offers a range of preventive health benefits. Some of the tests and screenings are paid in full while other may be subject to a deductible, co-insurance, and/or copay. Some of these benefits include a comprehensive review of your medical history including lifestyle, illnesses that may run in your family, and various assessments and screenings as appropriate. After reviewing your medical record and screening and assessments performed today your provider may have ordered immunizations, labs, imaging, and/or referrals for you. A list of these orders (if applicable) as well as your Preventive Care list are included within your After Visit Summary for your review. Other Preventive Recommendations:    · A preventive eye exam performed by an eye specialist is recommended every 1-2 years to screen for glaucoma; cataracts, macular degeneration, and other eye disorders. · A preventive dental visit is recommended every 6 months. · Try to get at least 150 minutes of exercise per week or 10,000 steps per day on a pedometer . · Order or download the FREE \"Exercise & Physical Activity: Your Everyday Guide\" from [employer]. Call 0-302.431.9410 or search [employer] online. · You need 3090-9686 mg of calcium and 6427-5293 IU of vitamin D per day. It is possible to meet your calcium requirement with diet alone, but a vitamin D supplement is usually necessary to meet this goal.  · When exposed to the sun, use a sunscreen that protects against both UVA and UVB radiation with an SPF of 30 or greater. Reapply every 2 to 3 hours or after sweating, drying off with a towel, or swimming. · Always wear a seat belt when traveling in a car. Always wear a helmet when riding a bicycle or motorcycle.

## 2021-06-07 NOTE — PROGRESS NOTES
Medicare Annual Wellness Visit  Name: Sher Treadwell Date: 2021   MRN: <[id number]> Sex: Male   Age: 77 y.o. Ethnicity: Non-/Non    : 1945 Race: Monica Campbell is here for Medicare AWV    Screenings for behavioral, psychosocial and functional/safety risks, and cognitive dysfunction are all negative except as indicated below. These results, as well as other patient data from the [address] form, are documented in Flowsheets linked to this Encounter. Allergies   Allergen Reactions    Lisinopril Other (See Comments)     cough       Prior to Visit Medications    Medication Sig Taking? Authorizing Provider   losartan (COZAAR) 100 MG tablet TAKE 1 TABLET BY MOUTH EVERY DAY Yes ROSAMARIA Montes CNP   amLODIPine (NORVASC) 5 MG tablet TAKE 1 TABLET BY MOUTH EVERY DAY Yes ROSAMARIA Montes CNP   esomeprazole (NEXIUM) 40 MG delayed release capsule Take 1 capsule by mouth every morning (before breakfast) Yes ROSAMARIA Montes CNP   atorvastatin (LIPITOR) 80 MG tablet TAKE 1 TABLET BY MOUTH EVERY DAY Yes ROSAMARIA Montes CNP   cetirizine-psuedoephedrine (ZYRTEC-D) 5-120 MG per extended release tablet Take 1 tablet by mouth 2 times daily Yes Historical Provider, MD   polyethylene glycol (GLYCOLAX) powder Take 17 g by mouth daily Yes Historical Provider, MD   tadalafil (CIALIS) 20 MG tablet Take 20 mg by mouth as needed for Erectile Dysfunction Yes Historical Provider, MD   hydrocortisone 1 % cream Apply topically as needed Apply topically 2 times daily. Yes Historical Provider, MD   fluticasone (FLONASE) 50 MCG/ACT nasal spray 1 spray by Nasal route daily. Patient taking differently: 1 spray by Nasal route as needed  Yes Poonam Yun MD   aspirin EC 81 MG EC tablet Take 81 mg by mouth daily.    Yes Historical Provider, MD   brimonidine-timolol (COMBIGAN) 0.2-0.5 % ophthalmic solution Place 1 drop into the right eye every 12 hours. Yes Rebeca Mayer MD       Past Medical History:   Diagnosis Date    AAA (abdominal aortic aneurysm) ([facility] [address] a    Allergic rhinitis, seasonal     Blindness of left eye     Colonic polyp     Diverticulosis of colon     Gastritis     hx of     GERD (gastroesophageal reflux disease)     Glaucoma NEC     Hypercholesteremia     Hypertrophy of prostate without urinary obstruction and other lower urinary tract symptoms (LUTS)        Past Surgical History:   Procedure Laterality Date    CATARACT REMOVAL Right 6/18/19    Dr. Reed Aldana, COLON, DIAGNOSTIC     [place] SURGERY  09/03/2019    PROSTATECTOMY  2010    dr proctor/rohit    REFRACTIVE SURGERY      right eye     TOTAL KNEE ARTHROPLASTY Left 4.19.16    Dr. Angelene Olszewski  03/15/2017    with mesh       Family History   Problem Relation Age of Onset    Heart Disease Mother     High Blood Pressure Mother     High Blood Pressure Father        CareTeam (Including outside providers/suppliers regularly involved in providing care):   Patient Care Team:  ROSAMARIA Gimenez CNP as PCP - General (Nurse Practitioner)  ROSAMARIA Gimenez CNP as PCP - [facility] Empaneled Provider    Wt Readings from Last 3 Encounters:   03/26/21 228 lb (103.4 kg)   06/08/20 232 lb (105.2 kg)   04/20/20 232 lb (105.2 kg)     Patient-Reported Vitals 6/7/2021   Patient-Reported Weight 221 lbs   Patient-Reported Height 6'1\"   Patient-Reported Systolic 870   Patient-Reported Diastolic 87   Patient-Reported Pulse -   Patient-Reported Temperature -      There is no height or weight on file to calculate BMI. Based upon direct observation of the patient, evaluation of cognition reveals recent and remote memory intact. Patient's complete Health Risk Assessment and screening values have been reviewed and are found in Flowsheets.  The following problems were reviewed today and where indicated follow up appointments were made and/or referrals ordered. Positive Risk Factor Screenings with Interventions:          General Health and ACP:  General  In general, how would you say your health is?: Excellent  In the past 7 days, have you experienced any of the following? New or Increased Pain, New or Increased Fatigue, Loneliness, Social Isolation, Stress or Anger?: None of These  Do you get the social and emotional support that you need?: Yes  Do you have a Living Will?: Yes  Advance Directives     Power of  Living Will ACP-Advance Directive ACP-Power of     Not on File Filed on 05/09/17 Filed Not on File      General Health Risk Interventions:  · Patient to bring in a copy of his living will and medical power of  at next visit.      Health Habits/Nutrition:  Health Habits/Nutrition  Do you exercise for at least 20 minutes 2-3 times per week?: Yes  Have you lost any weight without trying in the past 3 months?: No  Do you eat only one meal per day?: No  Have you seen the dentist within the past year?: Yes     Health Habits/Nutrition Interventions:  · Advised to work on healthy diet and aerobic exercise daily       Personalized Preventive Plan   Current Health Maintenance Status  Immunization History   Administered Date(s) Administered    COVID-19, Moderna, PF, 100mcg/0.5mL 03/04/2021, 04/01/2021    Influenza Vaccine, unspecified formulation 11/18/2018    Influenza Virus Vaccine 10/01/2009, 12/01/2011, 12/04/2012, 12/01/2014, 10/15/2020    Influenza, High Dose (Fluzone 65 yrs and older) 12/31/2013, 10/29/2015, 10/04/2016, 11/06/2017, 11/18/2018    Pneumococcal Conjugate 13-valent ([id number]) 03/30/2015    Pneumococcal Polysaccharide ([id number]) 08/03/2012    Tdap (Boostrix, Adacel) 02/20/2008, 08/12/2017    Zoster Recombinant (Shingrix) 07/24/2018, 11/30/2018        Health Maintenance   Topic Date Due    PSA counseling  12/31/2014   Nirav Ling Annual Wellness Visit (AWV)  Never done    A1C test (Diabetic or Prediabetic)  03/29/2022    Lipid screen  03/29/2022    Potassium monitoring  03/29/2022    Creatinine monitoring  03/29/2022    Colon cancer screen colonoscopy  03/15/2026    DTaP/Tdap/Td vaccine (3 - Td or Tdap) 08/12/2027    Flu vaccine  Completed    Shingles Vaccine  Completed    Pneumococcal 65+ years Vaccine  Completed    COVID-19 Vaccine  Completed    Hepatitis A vaccine  Aged Out    Hepatitis B vaccine  Aged Out    Hib vaccine  Aged Out    Meningococcal (ACWY) vaccine  Aged Out    Hepatitis C screen  Discontinued     Recommendations for [place] Due: see orders and patient instructions/AVS.  . Recommended screening schedule for the next 5-10 years is provided to the patient in written form: see Patient Instructions/AVS.    Jennifer West was seen today for medicare awv. Diagnoses and all orders for this visit:    Routine general medical examination at a health care facility  Healthy lifestyles reviewed: diet, aerobic exercise, sunscreen, vision and dental exams. Colonoscopy 3/2016- recall 5 years with Dr. Fan GriffithsJosef Nj is a 77 y.o. male being evaluated by a Virtual Visit (video and audio) encounter to address concerns as mentioned above. A caregiver was present when appropriate. Due to this being a TeleHealth encounter (During IDTCF-90 public health emergency), evaluation of the following organ systems was limited: Vitals/Constitutional/EENT/Resp/CV/GI//MS/Neuro/Skin/Heme-Lymph-Imm. Pursuant to the emergency declaration under the [address] [place] [address], [address] [place] [address] authority and the [employer] Act, this Virtual Visit was conducted with patient's (and/or legal guardian's) consent, to reduce the patient's risk of exposure to COVID-19 and provide necessary medical care.   The patient (and/or legal guardian) has also been advised to contact this office for worsening conditions or problems, and seek emergency medical treatment and/or call 911 if deemed necessary. Patient identification was verified at the start of the visit: Yes    Services were provided through a video synchronous discussion virtually to substitute for in-person clinic visit. Patient and provider were located at their individual homes. --ROSAMARIA Story CNP on 6/7/2021 at 3:19 PM    An electronic signature was used to authenticate this note.

## 2021-08-17 RX ORDER — ATORVASTATIN CALCIUM 80 MG/1
TABLET, FILM COATED ORAL
Qty: 90 TABLET | Refills: 1 | Status: SHIPPED | OUTPATIENT
Start: 2021-08-17 | End: 2022-02-10

## 2021-09-28 ENCOUNTER — OFFICE VISIT (OUTPATIENT)
Dept: FAMILY MEDICINE CLINIC | Age: 76
End: 2021-09-28
Payer: MEDICARE

## 2021-09-28 VITALS
BODY MASS INDEX: 29.69 KG/M2 | SYSTOLIC BLOOD PRESSURE: 129 MMHG | HEART RATE: 74 BPM | HEIGHT: 73 IN | DIASTOLIC BLOOD PRESSURE: 80 MMHG | WEIGHT: 224 LBS | OXYGEN SATURATION: 96 %

## 2021-09-28 DIAGNOSIS — K21.9 GASTROESOPHAGEAL REFLUX DISEASE, UNSPECIFIED WHETHER ESOPHAGITIS PRESENT: ICD-10-CM

## 2021-09-28 DIAGNOSIS — I71.40 ABDOMINAL AORTIC ANEURYSM (AAA) WITHOUT RUPTURE: ICD-10-CM

## 2021-09-28 DIAGNOSIS — E78.00 HYPERCHOLESTEREMIA: Primary | ICD-10-CM

## 2021-09-28 DIAGNOSIS — R73.03 PREDIABETES: ICD-10-CM

## 2021-09-28 DIAGNOSIS — I10 ESSENTIAL HYPERTENSION: ICD-10-CM

## 2021-09-28 DIAGNOSIS — Z23 NEED FOR INFLUENZA VACCINATION: ICD-10-CM

## 2021-09-28 PROCEDURE — G0008 ADMIN INFLUENZA VIRUS VAC: HCPCS | Performed by: NURSE PRACTITIONER

## 2021-09-28 PROCEDURE — 99214 OFFICE O/P EST MOD 30 MIN: CPT | Performed by: NURSE PRACTITIONER

## 2021-09-28 PROCEDURE — 90694 VACC AIIV4 NO PRSRV 0.5ML IM: CPT | Performed by: NURSE PRACTITIONER

## 2021-09-28 ASSESSMENT — ENCOUNTER SYMPTOMS
NAUSEA: 0
VOMITING: 0
CHEST TIGHTNESS: 0
CONSTIPATION: 0
DIARRHEA: 0
COUGH: 0
SHORTNESS OF BREATH: 0

## 2021-09-28 NOTE — PROGRESS NOTES
9/28/2021    This is a 77 y.o. male   Chief Complaint   Patient presents with    Hypertension     f/u   . HPI  Dontrell Hamilton is a 77 y.o. male here to f/u on HTN, HLD, prediabetes, aaa, gerd, chuckie. Chronic GERD- stable on daily PPI. Sometimes he will need to take BID. Has not been able to wean off in the past due to recurrent symptoms. AAA--ultrasound done in Dec 2020 --no change --no sx    CHUCKIE--daily flonase and zyrtec-d     Glaucoma--seeing eye dr for this--borderline pressures per pt    [place] polyps--done 3/16- recall 5 years     ED--stable on cialis prn--gets from urology    Prostate ca-s/p prostatectomy-seeing dr Rain Hilton recently per pt. Last PSA level was normal in Nov 2020    Hypertension:  Home blood pressure monitoring: No.  He is adherent to a low sodium diet. Patient denies chest pain, shortness of breath, peripheral edema, palpitations and dry cough. Antihypertensive medication side effects: no medication side effects noted. Had dry cough on lisinopril. Cough resolved on losartan. Hyperlipidemia:  No new myalgias or GI upset on atorvastatin (Lipitor). Prediabetes: diet- none; exercise- tries to stay active; walking couple of times per week. Going to the [facility] couple of times per week.      Lab Results   Component Value Date    LABA1C 5.7 03/29/2021    LABA1C 5.6 09/20/2019    LABA1C 5.6 12/19/2018     Lab Results   Component Value Date    LABMICR YES 08/07/2019    CREATININE 0.7 (L) 03/29/2021     Lab Results   Component Value Date    ALT 26 03/29/2021    AST 30 03/29/2021     Lab Results   Component Value Date    CHOL 142 09/20/2019    TRIG 82 09/20/2019    HDL 54 03/29/2021    LDLCALC 129 (H) 03/29/2021           Patient Active Problem List   Diagnosis    History of adenocarcinoma of prostate    Blindness of left eye    AAA (abdominal aortic aneurysm)- 3.9cm 12/2020, repeat 2 yr    Diverticula, colon    Chronic non-seasonal allergic rhinitis    Glaucoma-sees dr Regis Gutierres BPH w/o urinary obs/LUTS--s/p prostatectomy    Hypercholesteremia    GERD (gastroesophageal reflux disease)--on daily ppi    Colonic polyp-(last colo 3/16--pos-repeat 3-5yrs)-dr mccarty    ED (erectile dysfunction)--started on daily cialis by dr Drew Ortega    Incisional hernia, without obstruction or gangrene    Prediabetes    Essential hypertension       Current Outpatient Medications   Medication Sig Dispense Refill    atorvastatin (LIPITOR) 80 MG tablet TAKE 1 TABLET BY MOUTH EVERY DAY 90 tablet 1    losartan (COZAAR) 100 MG tablet TAKE 1 TABLET BY MOUTH EVERY DAY 90 tablet 1    amLODIPine (NORVASC) 5 MG tablet TAKE 1 TABLET BY MOUTH EVERY DAY 90 tablet 1    esomeprazole (NEXIUM) 40 MG delayed release capsule Take 1 capsule by mouth every morning (before breakfast) 90 capsule 1    cetirizine-psuedoephedrine (ZYRTEC-D) 5-120 MG per extended release tablet Take 1 tablet by mouth 2 times daily      polyethylene glycol (GLYCOLAX) powder Take 17 g by mouth daily      tadalafil (CIALIS) 20 MG tablet Take 20 mg by mouth as needed for Erectile Dysfunction      hydrocortisone 1 % cream Apply topically as needed Apply topically 2 times daily.  fluticasone (FLONASE) 50 MCG/ACT nasal spray 1 spray by Nasal route daily. (Patient taking differently: 1 spray by Nasal route as needed ) 3 Bottle 3    aspirin EC 81 MG EC tablet Take 81 mg by mouth daily.  brimonidine-timolol (COMBIGAN) 0.2-0.5 % ophthalmic solution Place 1 drop into the right eye every 12 hours. No current facility-administered medications for this visit. Allergies   Allergen Reactions    Lisinopril Other (See Comments)     cough       Review of Systems   Constitutional: Negative for activity change, fatigue and fever. Respiratory: Negative for cough, chest tightness and shortness of breath. Cardiovascular: Negative for chest pain and leg swelling.    Gastrointestinal: Negative for constipation, diarrhea, nausea and vomiting. Neurological: Negative for dizziness, syncope, weakness and headaches. Psychiatric/Behavioral: Negative for confusion. Vitals:    09/28/21 0904   BP: 129/80   Site: Right Upper Arm   Position: Sitting   Cuff Size: Medium Adult   Pulse: 74   SpO2: 96%   Weight: 224 lb (101.6 kg)   Height: 6' 1\" (1.854 m)       Body mass index is 29.55 kg/m². Wt Readings from Last 3 Encounters:   09/28/21 224 lb (101.6 kg)   03/26/21 228 lb (103.4 kg)   06/08/20 232 lb (105.2 kg)       BP Readings from Last 3 Encounters:   09/28/21 129/80   03/26/21 132/82   06/08/20 (!) 151/96       Physical Exam  Vitals and nursing note reviewed. Constitutional:       General: He is not in acute distress. Appearance: He is well-developed. HENT:      Head: Normocephalic and atraumatic. Eyes:      Extraocular Movements: Extraocular movements intact. Conjunctiva/sclera: Conjunctivae normal.   Cardiovascular:      Rate and Rhythm: Normal rate and regular rhythm. Heart sounds: Normal heart sounds. No murmur heard. No friction rub. No gallop. Pulmonary:      Effort: Pulmonary effort is normal. No respiratory distress. Breath sounds: Normal breath sounds. Musculoskeletal:      Cervical back: Neck supple. Right lower leg: No edema. Left lower leg: No edema. Skin:     General: Skin is warm and dry. Neurological:      Mental Status: He is alert and oriented to person, place, and time. Psychiatric:         Behavior: Behavior normal.         Thought Content: Thought content normal.         Judgment: Judgment normal.         Assessment and Plan  Zena Archuleta was seen today for check-up. Diagnoses and all orders for this visit:    Hypercholesteremia  -     Comprehensive Metabolic Panel; Future  -     Lipids checked 3/2021  On atorvastatin 80 mg daily. Diet and weight loss discussed and needed.     Prediabetes  -     Hemoglobin A1C; Future  Low-carb diet, aerobic exercise, and weight loss discussed in needed. Gastroesophageal reflux disease, esophagitis presence not specified: Stable on Nexium 40 mg daily. He has not been able to wean off medication in the past due to recurrent symptoms. Advised to avoid foods that cause reflux.   -     CBC Auto Differential; Future    Abdominal aortic aneurysm (AAA) without rupture (HCC)  Asymptomatic. Recheck ultrasound Dec 2022. Essential hypertension: controlled on losartan 100 mg daily and amlodipine 5 mg daily. Low-salt diet, aerobic exercise, and weight loss discussed in needed. Chronic non-seasonal allergic rhinitis, unspecified trigger  Stable. Continue flonase and zyrtec. Need for influenza vaccination  -     INFLUENZA, QUADV, ADJUVANTED, 65 YRS =, IM, PF, PREFILL SYR, 0.5ML (FLUAD)    Return in about 6 months (around 3/28/2022), or if symptoms worsen or fail to improve, for chronic conditions.

## 2021-09-29 DIAGNOSIS — K21.9 GASTROESOPHAGEAL REFLUX DISEASE: ICD-10-CM

## 2021-09-29 DIAGNOSIS — D75.89 MACROCYTIC: Primary | ICD-10-CM

## 2021-09-29 DIAGNOSIS — I10 ESSENTIAL HYPERTENSION: ICD-10-CM

## 2021-09-29 RX ORDER — LOSARTAN POTASSIUM 100 MG/1
TABLET ORAL
Qty: 90 TABLET | Refills: 1 | Status: SHIPPED | OUTPATIENT
Start: 2021-09-29 | End: 2022-03-07

## 2021-09-29 RX ORDER — ESOMEPRAZOLE MAGNESIUM 40 MG/1
CAPSULE, DELAYED RELEASE ORAL
Qty: 90 CAPSULE | Refills: 1 | Status: SHIPPED | OUTPATIENT
Start: 2021-09-29 | End: 2022-03-07

## 2021-10-02 ENCOUNTER — PATIENT MESSAGE (OUTPATIENT)
Dept: FAMILY MEDICINE CLINIC | Age: 76
End: 2021-10-02

## 2021-10-04 NOTE — TELEPHONE ENCOUNTER
From: Stacie Campbell  To: ROSAMARIA Ortega CNP  Sent: 10/2/2021 10:58 AM EDT  Subject: Test Results Question    Will you send me a message on my chart when you get the b12 results from the lab?

## 2021-10-22 RX ORDER — AMOXICILLIN 500 MG/1
CAPSULE ORAL
COMMUNITY
Start: 2021-08-19

## 2021-10-22 NOTE — PROGRESS NOTES
[address] time_10/28/21 0615___________        Surgery time___0700_________    Take the following medications with a sip of water:    Do not eat or drink anything after 12:00 midnight prior to your surgery. This includes water chewing gum, mints and ice chips. You may brush your teeth and gargle the morning of your surgery, but do not swallow the water     Please see your family doctor/pediatrician for a history and physical and/or concerning medications. Bring any test results/reports from your physicians office. If you are under the care of a heart doctor or specialist doctor, please be aware that you may be asked to them for clearance    You may be asked to stop blood thinners such as Coumadin, Plavix, Fragmin, Lovenox, etc., or any anti-inflammatories such as:  Aspirin, Ibuprofen, Advil, Naproxen prior to your surgery. We also ask that you stop any OTC medications such as fish oil, vitamin E, glucosamine, garlic, Multivitamins, COQ 10, etc.    We ask that you do not smoke 24 hours prior to surgery  We ask that you do not  drink any alcoholic beverages 24 hours prior to surgery     You must make arrangements for a responsible adult to take you home after your surgery. For your safety you will not be allowed to leave alone or drive yourself home. Your surgery will be cancelled if you do not have a ride home. Also for your safety, it is strongly suggested that someone stay with you the first 24 hours after your surgery. A parent or legal guardian must accompany a child scheduled for surgery and plan to stay at the hospital until the child is discharged. Please do not bring other children with you. For your comfort, please wear simple loose fitting clothing to the hospital.  Please do not bring valuables.     Do not wear any make-up or nail polish on your fingers or toes      For your safety, please do not wear any jewelry or body piercing's on the day of surgery. All jewelry must be removed. If you have dentures, they will be removed before going to operating room. For your convenience, we will provide you with a container. If you wear contact lenses or glasses, they will be removed, please bring a case for them. If you have a living will and a durable power of  for healthcare, please bring in a copy. As part of our patient safety program to minimize surgical site infections, we ask you to do the following:    · Please notify your surgeon if you develop any illness between         now and the  day of your surgery. · This includes a cough, cold, fever, sore throat, nausea,         or vomiting, and diarrhea, etc.  ·  Please notify your surgeon if you experience dizziness, shortness         of breath or blurred vision between now and the time of your surgery. Do not shave your operative site 96 hours prior to surgery. For face and neck surgery, men may use an electric razor 48 hours   prior to surgery. You may shower the night before surgery or the morning of   your surgery with an antibacterial soap. You will need to bring a photo ID and insurance card    [facility] has an onsite pharmacy, would you like to utilize our pharmacy     If you will be staying overnight and use a C-pap machine, please bring   your C-pap to hospital     Our goal is to provide you with excellent care, therefore, visitors will be limited to two(2) in the room at a time so that we may focus on providing this care for you. Please contact pre-admission testing if you have any further questions. [facility] phone number:  [phone]  Please note these are generalized instructions for all surgical cases, you may be provided with more specific instructions according to your surgery.

## 2021-10-27 ENCOUNTER — ANESTHESIA EVENT (OUTPATIENT)
Dept: ENDOSCOPY | Age: 76
End: 2021-10-27
Payer: MEDICARE

## 2021-10-27 NOTE — ANESTHESIA PRE PROCEDURE
Department of Anesthesiology  Preprocedure Note       Name:  Nikita Mary   Age:  77 y.o.  :  1945                                          MRN:  [id number]         Date:  10/27/2021      Surgeon: Etta Signs):  Drew Jones MD    Procedure: Procedure(s):  COLONOSCOPY DIAGNOSTIC    Medications prior to admission:   Prior to Admission medications    Medication Sig Start Date End Date Taking? Authorizing Provider   esomeprazole (NEXIUM) 40 MG delayed release capsule TAKE 1 CAPSULE BY MOUTH EVERY DAY IN THE MORNING BEFORE BREAKFAST 21  Yes Nadja Nasra, APRN - CNP   losartan (COZAAR) 100 MG tablet TAKE 1 TABLET BY MOUTH EVERY DAY 21  Yes Nadja Nasra, APRN - CNP   atorvastatin (LIPITOR) 80 MG tablet TAKE 1 TABLET BY MOUTH EVERY DAY 21  Yes Nadja Nasra, APRN - CNP   amLODIPine (NORVASC) 5 MG tablet TAKE 1 TABLET BY MOUTH EVERY DAY 3/26/21  Yes Nadja Nasra, APRN - CNP   cetirizine-psuedoephedrine (ZYRTEC-D) 5-120 MG per extended release tablet Take 1 tablet by mouth 2 times daily   Yes Historical Provider, MD   polyethylene glycol (GLYCOLAX) powder Take 17 g by mouth daily   Yes Historical Provider, MD   tadalafil (CIALIS) 20 MG tablet Take 20 mg by mouth as needed for Erectile Dysfunction   Yes Historical Provider, MD   hydrocortisone 1 % cream Apply topically as needed Apply topically 2 times daily. Yes Historical Provider, MD   fluticasone (FLONASE) 50 MCG/ACT nasal spray 1 spray by Nasal route daily. Patient taking differently: 1 spray by Nasal route as needed  13  Yes Magda Burger MD   aspirin EC 81 MG EC tablet Take 81 mg by mouth daily. Yes Historical Provider, MD   brimonidine-timolol (COMBIGAN) 0.2-0.5 % ophthalmic solution Place 1 drop into the right eye every 12 hours.      Yes Audrey Sheridan MD   amoxicillin (AMOXIL) 500 MG capsule TAKE 4 CAPSULES BY MOUTH ONE HOUR BEFORE YOUR APPOINTMENT 21   Historical Provider, MD Current medications:    No current facility-administered medications for this encounter. Current Outpatient Medications   Medication Sig Dispense Refill    esomeprazole (NEXIUM) 40 MG delayed release capsule TAKE 1 CAPSULE BY MOUTH EVERY DAY IN THE MORNING BEFORE BREAKFAST 90 capsule 1    losartan (COZAAR) 100 MG tablet TAKE 1 TABLET BY MOUTH EVERY DAY 90 tablet 1    atorvastatin (LIPITOR) 80 MG tablet TAKE 1 TABLET BY MOUTH EVERY DAY 90 tablet 1    amLODIPine (NORVASC) 5 MG tablet TAKE 1 TABLET BY MOUTH EVERY DAY 90 tablet 1    cetirizine-psuedoephedrine (ZYRTEC-D) 5-120 MG per extended release tablet Take 1 tablet by mouth 2 times daily      polyethylene glycol (GLYCOLAX) powder Take 17 g by mouth daily      tadalafil (CIALIS) 20 MG tablet Take 20 mg by mouth as needed for Erectile Dysfunction      hydrocortisone 1 % cream Apply topically as needed Apply topically 2 times daily.  fluticasone (FLONASE) 50 MCG/ACT nasal spray 1 spray by Nasal route daily. (Patient taking differently: 1 spray by Nasal route as needed ) 3 Bottle 3    aspirin EC 81 MG EC tablet Take 81 mg by mouth daily.  brimonidine-timolol (COMBIGAN) 0.2-0.5 % ophthalmic solution Place 1 drop into the right eye every 12 hours.  amoxicillin (AMOXIL) 500 MG capsule TAKE 4 CAPSULES BY MOUTH ONE HOUR BEFORE YOUR APPOINTMENT         Allergies:     Allergies   Allergen Reactions    Lisinopril Other (See Comments)     cough       Problem List:    Patient Active Problem List   Diagnosis Code    History of adenocarcinoma of prostate Z85.46    Blindness of left eye H54.40    AAA (abdominal aortic aneurysm)- 3.9cm 12/2020, repeat 2 yr I71.4    Diverticula, colon K57.30    Chronic non-seasonal allergic rhinitis J30.89    Glaucoma-sees dr Fry Render H40.9    BPH w/o urinary obs/LUTS--s/p prostatectomy N40.0    Hypercholesteremia E78.00    GERD (gastroesophageal reflux disease)--on daily ppi K21.9    Colonic polyp-(last colo 3/16--pos-repeat 3-5yrs)-dr mccarty K63.5    ED (erectile dysfunction)--started on daily cialis by dr Sapphire Clark N52.9    Incisional hernia, without obstruction or gangrene K43.2    Prediabetes R73.03    Essential hypertension I10       Past Medical History:        Diagnosis Date    AAA (abdominal aortic aneurysm) (HCC) a    3.8    Allergic rhinitis, seasonal     Arthritis     left knee grade 4 severe-post remote soft ball injury    Blindness of left eye     Cancer ([address] 2016    prostate    Colonic polyp     Diverticulosis of colon     Gastritis     hx of     GERD (gastroesophageal reflux disease)     Glaucoma NEC     Hypercholesteremia     Hypertrophy of prostate without urinary obstruction and other lower urinary tract symptoms (LUTS)        Past Surgical History:        Procedure Laterality Date    CATARACT REMOVAL Right 19    Dr. Ajay Poole, COLON, DIAGNOSTIC     [place] SURGERY  2019    PROSTATECTOMY      dr proctor/rohit    REFRACTIVE SURGERY      right eye     TOTAL KNEE ARTHROPLASTY Left 4.16    Dr. Kike Murrell  03/15/2017    with mesh       Social History:    Social History     Tobacco Use    Smoking status: Former Smoker     Packs/day: 0.50     Years: 3.00     Pack years: 1.50     Types: Cigarettes     Start date: 1967     Quit date: 1970     Years since quittin.8    Smokeless tobacco: Never Used    Tobacco comment: counseled on tobacco exposure avoidance   Substance Use Topics    Alcohol use:  Yes     Alcohol/week: 0.0 standard drinks     Comment: 2-3 drinks 5 days per week beer or gin                                Counseling given: Not Answered  Comment: counseled on tobacco exposure avoidance      Vital Signs (Current):   Vitals:    10/22/21 1414   Weight: 224 lb (101.6 kg)   Height: 6' 1\" (1.854 m) BP Readings from Last 3 Encounters:   09/28/21 129/80   03/26/21 132/82   06/08/20 (!) 151/96       NPO Status:                                                                                 BMI:   Wt Readings from Last 3 Encounters:   09/28/21 224 lb (101.6 kg)   03/26/21 228 lb (103.4 kg)   06/08/20 232 lb (105.2 kg)     Body mass index is 29.55 kg/m². CBC:   Lab Results   Component Value Date    WBC 4.8 09/28/2021    RBC 4.51 09/28/2021    HGB 15.8 09/28/2021    HCT 46.8 09/28/2021    .9 09/28/2021    RDW 14.4 09/28/2021     09/28/2021       CMP:   Lab Results   Component Value Date     09/28/2021    K 5.1 09/28/2021    K 3.6 08/07/2019     09/28/2021    CO2 26 09/28/2021    BUN 17 09/28/2021    CREATININE 0.7 09/28/2021    GFRAA >60 09/28/2021    GFRAA >60 08/06/2012    AGRATIO 1.4 03/29/2021    LABGLOM >60 09/28/2021    LABGLOM 85.0 07/13/2011    GLUCOSE 92 09/28/2021    GLUCOSE 93 07/13/2011    PROT 7.3 03/29/2021    PROT 6.8 08/06/2012    CALCIUM 10.2 09/28/2021    BILITOT 0.3 03/29/2021    ALKPHOS 77 03/29/2021    AST 30 03/29/2021    ALT 26 03/29/2021       POC Tests: No results for input(s): POCGLU, POCNA, POCK, POCCL, POCBUN, POCHEMO, POCHCT in the last 72 hours.     Coags: No results found for: PROTIME, INR, APTT    HCG (If Applicable): No results found for: PREGTESTUR, PREGSERUM, HCG, HCGQUANT     ABGs: No results found for: PHART, PO2ART, WTQ8ZGW, OVJ2MGE, BEART, E5JPEGWK     Type & Screen (If Applicable):  No results found for: LABABO, LABRH    Drug/Infectious Status (If Applicable):  No results found for: HIV, HEPCAB    COVID-19 Screening (If Applicable): No results found for: COVID19        Anesthesia Evaluation   no history of anesthetic complications:   Airway: Mallampati: III  TM distance: >3 FB   Neck ROM: full  Mouth opening: > = 3 FB Dental:      Comment: Small chip on right upper incisor, no loose teeth    Pulmonary: breath sounds clear to auscultation      (-) COPD, asthma, sleep apnea, rhonchi, wheezes, rales and not a current smoker                          ROS comment: Chronic allergic rhinits   Cardiovascular:  Exercise tolerance: good (>4 METS),   (+) hypertension:, hyperlipidemia    (-) weak pulses and peripheral edema      Rhythm: regular  Rate: normal                    Neuro/Psych:      (-) seizures, TIA and CVA            ROS comment: Left eye blindness GI/Hepatic/Renal:   (+) GERD:, bowel prep,      (-) liver disease, no renal disease and no morbid obesity      ROS comment: h/o colon polyps. Endo/Other:    (+) : arthritis (s/p left TKA): OA., malignancy/cancer (prostate, skin: basal cell). (-) diabetes mellitus, hypothyroidism, hyperthyroidism               Abdominal:         (-) obese Abdomen: soft. Vascular:           ROS comment: AAA. Other Findings: Underwent procedure to remove basal cell carcinoma on left temple 10/27. Patient with padded dressing over area. Denies tenderness over area. Understands that he will be placed in LLD for procedure. Anesthesia Plan      MAC     ASA 2       Induction: intravenous. Anesthetic plan and risks discussed with patient. Plan discussed with CRNA. This pre-anesthesia assessment may be used as a history and physical.    DOS STAFF ADDENDUM:    Pt seen and examined, chart reviewed (including anesthesia, drug and allergy history). No interval changes to history and physical examination. Anesthetic plan, risks, benefits, alternatives, and personnel involved discussed with patient. Patient verbalized an understanding and agrees to proceed.       Lon Oswald MD  October 28, 2021  6:47 AM

## 2021-10-28 ENCOUNTER — ANESTHESIA (OUTPATIENT)
Dept: ENDOSCOPY | Age: 76
End: 2021-10-28
Payer: MEDICARE

## 2021-10-28 ENCOUNTER — HOSPITAL ENCOUNTER (OUTPATIENT)
Age: 76
Setting detail: OUTPATIENT SURGERY
Discharge: HOME | End: 2021-10-28
Attending: INTERNAL MEDICINE | Admitting: INTERNAL MEDICINE
Payer: MEDICARE

## 2021-10-28 VITALS
DIASTOLIC BLOOD PRESSURE: 79 MMHG | SYSTOLIC BLOOD PRESSURE: 131 MMHG | BODY MASS INDEX: 29.55 KG/M2 | HEIGHT: 73 IN | HEART RATE: 65 BPM | TEMPERATURE: 97.3 F | OXYGEN SATURATION: 95 % | WEIGHT: 223 LBS | RESPIRATION RATE: 16 BRPM

## 2021-10-28 VITALS — DIASTOLIC BLOOD PRESSURE: 80 MMHG | OXYGEN SATURATION: 97 % | SYSTOLIC BLOOD PRESSURE: 115 MMHG

## 2021-10-28 DIAGNOSIS — Z86.010 HISTORY OF COLON POLYPS: ICD-10-CM

## 2021-10-28 PROCEDURE — 3609010300 HC COLONOSCOPY W/BIOPSY SINGLE/MULTIPLE: Performed by: INTERNAL MEDICINE

## 2021-10-28 PROCEDURE — 3700000001 HC ADD 15 MINUTES (ANESTHESIA): Performed by: INTERNAL MEDICINE

## 2021-10-28 PROCEDURE — 2500000003 HC RX 250 WO HCPCS: Performed by: NURSE ANESTHETIST, CERTIFIED REGISTERED

## 2021-10-28 PROCEDURE — 3700000000 HC ANESTHESIA ATTENDED CARE: Performed by: INTERNAL MEDICINE

## 2021-10-28 PROCEDURE — 2580000003 HC RX 258: Performed by: ANESTHESIOLOGY

## 2021-10-28 PROCEDURE — 7100000011 HC PHASE II RECOVERY - ADDTL 15 MIN: Performed by: INTERNAL MEDICINE

## 2021-10-28 PROCEDURE — 88305 TISSUE EXAM BY PATHOLOGIST: CPT

## 2021-10-28 PROCEDURE — 2709999900 HC NON-CHARGEABLE SUPPLY: Performed by: INTERNAL MEDICINE

## 2021-10-28 PROCEDURE — 6360000002 HC RX W HCPCS: Performed by: NURSE ANESTHETIST, CERTIFIED REGISTERED

## 2021-10-28 PROCEDURE — 7100000010 HC PHASE II RECOVERY - FIRST 15 MIN: Performed by: INTERNAL MEDICINE

## 2021-10-28 RX ORDER — SODIUM CHLORIDE 9 MG/ML
INJECTION, SOLUTION INTRAVENOUS CONTINUOUS
Status: DISCONTINUED | OUTPATIENT
Start: 2021-10-28 | End: 2021-10-28 | Stop reason: HOSPADM

## 2021-10-28 RX ORDER — PROPOFOL 10 MG/ML
INJECTION, EMULSION INTRAVENOUS PRN
Status: DISCONTINUED | OUTPATIENT
Start: 2021-10-28 | End: 2021-10-28 | Stop reason: SDUPTHER

## 2021-10-28 RX ORDER — SODIUM CHLORIDE 0.9 % (FLUSH) 0.9 %
10 SYRINGE (ML) INJECTION PRN
Status: DISCONTINUED | OUTPATIENT
Start: 2021-10-28 | End: 2021-10-28 | Stop reason: HOSPADM

## 2021-10-28 RX ORDER — SODIUM CHLORIDE 0.9 % (FLUSH) 0.9 %
10 SYRINGE (ML) INJECTION EVERY 12 HOURS SCHEDULED
Status: DISCONTINUED | OUTPATIENT
Start: 2021-10-28 | End: 2021-10-28 | Stop reason: HOSPADM

## 2021-10-28 RX ORDER — SODIUM CHLORIDE 9 MG/ML
25 INJECTION, SOLUTION INTRAVENOUS PRN
Status: DISCONTINUED | OUTPATIENT
Start: 2021-10-28 | End: 2021-10-28 | Stop reason: HOSPADM

## 2021-10-28 RX ORDER — ONDANSETRON 2 MG/ML
4 INJECTION INTRAMUSCULAR; INTRAVENOUS
Status: DISCONTINUED | OUTPATIENT
Start: 2021-10-28 | End: 2021-10-28 | Stop reason: HOSPADM

## 2021-10-28 RX ORDER — LIDOCAINE HYDROCHLORIDE 20 MG/ML
INJECTION, SOLUTION EPIDURAL; INFILTRATION; INTRACAUDAL; PERINEURAL PRN
Status: DISCONTINUED | OUTPATIENT
Start: 2021-10-28 | End: 2021-10-28 | Stop reason: SDUPTHER

## 2021-10-28 RX ADMIN — PROPOFOL 30 MG: 10 INJECTION, EMULSION INTRAVENOUS at 07:12

## 2021-10-28 RX ADMIN — PROPOFOL 50 MG: 10 INJECTION, EMULSION INTRAVENOUS at 07:07

## 2021-10-28 RX ADMIN — PROPOFOL 50 MG: 10 INJECTION, EMULSION INTRAVENOUS at 07:18

## 2021-10-28 RX ADMIN — SODIUM CHLORIDE: 9 INJECTION, SOLUTION INTRAVENOUS at 06:56

## 2021-10-28 RX ADMIN — PROPOFOL 50 MG: 10 INJECTION, EMULSION INTRAVENOUS at 07:09

## 2021-10-28 RX ADMIN — LIDOCAINE HYDROCHLORIDE 60 MG: 20 INJECTION, SOLUTION EPIDURAL; INFILTRATION; INTRACAUDAL; PERINEURAL at 07:07

## 2021-10-28 RX ADMIN — PROPOFOL 20 MG: 10 INJECTION, EMULSION INTRAVENOUS at 07:15

## 2021-10-28 ASSESSMENT — PAIN SCALES - GENERAL
PAINLEVEL_OUTOF10: 0
PAINLEVEL_OUTOF10: 0

## 2021-10-28 ASSESSMENT — LIFESTYLE VARIABLES: SMOKING_STATUS: 0

## 2021-10-28 ASSESSMENT — PAIN - FUNCTIONAL ASSESSMENT: PAIN_FUNCTIONAL_ASSESSMENT: 0-10

## 2021-10-28 NOTE — H&P
[facility]   Pre-operative History and Physical    Patient: Danika Barriga  : 1945  Acct#: [de-identified]    History Obtained From: electronic medical record    HISTORY OF PRESENT ILLNESS  Procedure:Colonoscopy  Indications:surveillance  Past Medical History:        Diagnosis Date    AAA (abdominal aortic aneurysm) ([facility] [address] a    3.8    Allergic rhinitis, seasonal     Arthritis     left knee grade 4 severe-post remote soft ball injury    Blindness of left eye     Cancer ([facility] [address] 2016    prostate    Colonic polyp     Diverticulosis of colon     Gastritis     hx of     GERD (gastroesophageal reflux disease)     Glaucoma NEC     Hypercholesteremia     Hypertrophy of prostate without urinary obstruction and other lower urinary tract symptoms (LUTS)      Past Surgical History:        Procedure Laterality Date    CATARACT REMOVAL Right 19    Dr. Sarita Sanders, COLON, DIAGNOSTIC      [address] [place] [address] SURGERY  2019    PROSTATECTOMY      dr proctor/rohit    REFRACTIVE SURGERY      right eye     TOTAL KNEE ARTHROPLASTY Left 16    Dr. Kevin Luciano  03/15/2017    with mesh     Medications prior to admission:   Prior to Admission medications    Medication Sig Start Date End Date Taking?  Authorizing Provider   amoxicillin (AMOXIL) 500 MG capsule TAKE 4 CAPSULES BY MOUTH ONE HOUR BEFORE YOUR APPOINTMENT 21  Yes Historical Provider, MD   esomeprazole (NEXIUM) 40 MG delayed release capsule TAKE 1 CAPSULE BY MOUTH EVERY DAY IN THE MORNING BEFORE BREAKFAST 21  Yes ROSAMARIA Jeter CNP   losartan (COZAAR) 100 MG tablet TAKE 1 TABLET BY MOUTH EVERY DAY 21  Yes ROSAMARIA Jeter CNP   atorvastatin (LIPITOR) 80 MG tablet TAKE 1 TABLET BY MOUTH EVERY DAY 21  Yes ROSAMARIA Jeter CNP   amLODIPine (NORVASC) 5 MG tablet TAKE 1 TABLET BY MOUTH EVERY DAY 3/26/21  Yes Severo Pittman, APRN - CNP   cetirizine-psuedoephedrine (ZYRTEC-D) 5-120 MG per extended release tablet Take 1 tablet by mouth 2 times daily   Yes Historical Provider, MD   polyethylene glycol (GLYCOLAX) powder Take 17 g by mouth daily   Yes Historical Provider, MD   tadalafil (CIALIS) 20 MG tablet Take 20 mg by mouth as needed for Erectile Dysfunction   Yes Historical Provider, MD   hydrocortisone 1 % cream Apply topically as needed Apply topically 2 times daily. Yes Historical Provider, MD   aspirin EC 81 MG EC tablet Take 81 mg by mouth daily. Yes Historical Provider, MD   brimonidine-timolol (COMBIGAN) 0.2-0.5 % ophthalmic solution Place 1 drop into the right eye every 12 hours. Yes Cherrie Nava MD   fluticasone Thelma Bellis) 50 MCG/ACT nasal spray 1 spray by Nasal route daily. Patient taking differently: 1 spray by Nasal route as needed  13   Sherin Powell MD     Allergies:   Lisinopril    Social History     Socioeconomic History    Marital status:      Spouse name: Aurora Howard Number of children: 3    Years of education: Not on file    Highest education level: Not on file   Occupational History    Occupation: Retired-    Tobacco Use    Smoking status: Former Smoker     Packs/day: 0.50     Years: 3.00     Pack years: 1.50     Types: Cigarettes     Start date: 1967     Quit date: 1970     Years since quittin.8    Smokeless tobacco: Never Used    Tobacco comment: counseled on tobacco exposure avoidance   Vaping Use    Vaping Use: Never used   Substance and Sexual Activity    Alcohol use:  Yes     Alcohol/week: 0.0 standard drinks     Comment: 2-3 drinks 5 days per week beer or gin    Drug use: No    Sexual activity: Yes     Partners: Female   Other Topics Concern    Not on file   Social History Narrative    Not on file     Social Determinants of Health     Financial Resource Strain:     Difficulty of Paying Living Expenses:    Food Insecurity:     Worried About Running Out of Food in the Last Year:     [address] in the Last Year:    Transportation Needs:     Lack of Transportation (Medical):  Lack of Transportation (Non-Medical):    Physical Activity:     Days of Exercise per Week:     Minutes of Exercise per Session:    Stress:     Feeling of Stress :    Social Connections:     Frequency of Communication with Friends and Family:     Frequency of Social Gatherings with Friends and Family:     Attends Je[employer] witness Services:     Active Member of Clubs or Organizations:     Attends Club or Organization Meetings:     Marital Status:    Intimate Partner Violence:     Fear of Current or Ex-Partner:     Emotionally Abused:     Physically Abused:     Sexually Abused:      Family History   Problem Relation Age of Onset    Heart Disease Mother     High Blood Pressure Mother     High Blood Pressure Father          PHYSICAL EXAM:      BP (!) 159/92   Pulse 80   Temp 96.6 °F (35.9 °C) (Temporal)   Resp 16   Ht 6' 1\" (1.854 m)   Wt 223 lb (101.2 kg)   SpO2 95%   BMI 29.42 kg/m²  I        Heart:normal    Lungs: normal    Abdomen: normal      ASA Grade:  See anesthesia note      ASSESSMENT AND PLAN:    1. Procedure options, risks and benefits reviewed with patient and expresses understanding.

## 2021-10-28 NOTE — PROCEDURES
[facility]  Endoscopy Note    Patient: Fay Armas  : 1945  Acct#: [de-identified]    Procedure: Colonoscopy with biopsy    Date:  10/28/2021    Surgeon:  Laura Lackey MD, MD    Referring Physician:  Tequila Westbrook    Previous Colonoscopy: Yes  Date: 3/15/16  Greater than 3 years? Yes    Preoperative Diagnosis:  surveillance    Postoperative Diagnosis:  Colon polyp    Anesthesia:  See anesthesia note    Indications: This is a 77y.o. year old male who presents today with previous adenomatous polyp. Procedure: An informed consent was obtained from the patient after explanation of indications, benefits, possible risks and complications of the procedure. The patient was then taken to the endoscopy suite, placed in the left lateral decubitus position, and the above IV anesthesia was administered. A digital rectal examination was performed and revealed negative without mass, lesions or tenderness. The Olympus CFQ-180-AL video colonoscope was placed in the patient's rectum under digital direction and advanced to the cecum. The cecum was identified by characteristic anatomy and ballottment. The ileocecal valve was identified. The preparation was excellent. The scope was then withdrawn back through the cecum, ascending, transverse, descending and sigmoid colons. Carefull circumferential examination of the mucosa in these areas demonstrated a 4 mm polyp in the ascending colon that was biopsied and removed. The scope was then withdrawn into the rectum and retroflexed. The retroflexed view of the anal verge and rectum demonstrates no abnormalities. The scope was straightened, the colon was decompressed and the scope was withdrawn from the patient. The patient tolerated the procedure well and was taken to the PACU in good condition. Estimated Blood Loss:  none    Impression:  Colon polyp    Recommendations:  Await pathology. Repeat colonoscopy in 5 years.     Laura Lackey MD, MD [facility]  10/28/2021

## 2021-10-28 NOTE — H&P
[facility]   Pre-operative History and Physical    Patient: Yecenia Rinaldi  : 1945  Acct#: [de-identified]    History Obtained From: electronic medical record    HISTORY OF PRESENT ILLNESS  Procedure:Colonoscopy  Indications:surveillance  Past Medical History:        Diagnosis Date    AAA (abdominal aortic aneurysm) ([facility] [address] a    3.8    Allergic rhinitis, seasonal     Arthritis     left knee grade 4 severe-post remote soft ball injury    Blindness of left eye     Cancer ([facility] [address] 2016    prostate    Colonic polyp     Diverticulosis of colon     Gastritis     hx of     GERD (gastroesophageal reflux disease)     Glaucoma NEC     Hypercholesteremia     Hypertrophy of prostate without urinary obstruction and other lower urinary tract symptoms (LUTS)      Past Surgical History:        Procedure Laterality Date    CATARACT REMOVAL Right 19    Dr. Maricruz Hancock, COLON, DIAGNOSTIC      [address] [place] [address] SURGERY  2019    PROSTATECTOMY      dr proctor/rohit    REFRACTIVE SURGERY      right eye     TOTAL KNEE ARTHROPLASTY Left 16    Dr. Norbert Medina  03/15/2017    with mesh     Medications prior to admission:   Prior to Admission medications    Medication Sig Start Date End Date Taking?  Authorizing Provider   amoxicillin (AMOXIL) 500 MG capsule TAKE 4 CAPSULES BY MOUTH ONE HOUR BEFORE YOUR APPOINTMENT 21  Yes Historical Provider, MD   esomeprazole (NEXIUM) 40 MG delayed release capsule TAKE 1 CAPSULE BY MOUTH EVERY DAY IN THE MORNING BEFORE BREAKFAST 21  Yes ROSAMARIA Simon CNP   losartan (COZAAR) 100 MG tablet TAKE 1 TABLET BY MOUTH EVERY DAY 21  Yes ROSAMARIA Simon CNP   atorvastatin (LIPITOR) 80 MG tablet TAKE 1 TABLET BY MOUTH EVERY DAY 21  Yes ROSAMARIA Simon CNP   amLODIPine (NORVASC) 5 MG tablet TAKE 1 TABLET BY MOUTH EVERY DAY 3/26/21  Yes Brittany Agosto, APRN - CNP   cetirizine-psuedoephedrine (ZYRTEC-D) 5-120 MG per extended release tablet Take 1 tablet by mouth 2 times daily   Yes Historical Provider, MD   polyethylene glycol (GLYCOLAX) powder Take 17 g by mouth daily   Yes Historical Provider, MD   tadalafil (CIALIS) 20 MG tablet Take 20 mg by mouth as needed for Erectile Dysfunction   Yes Historical Provider, MD   hydrocortisone 1 % cream Apply topically as needed Apply topically 2 times daily. Yes Historical Provider, MD   aspirin EC 81 MG EC tablet Take 81 mg by mouth daily. Yes Historical Provider, MD   brimonidine-timolol (COMBIGAN) 0.2-0.5 % ophthalmic solution Place 1 drop into the right eye every 12 hours. Yes Lupillo Peña MD   fluticasone [facility]) 50 MCG/ACT nasal spray 1 spray by Nasal route daily. Patient taking differently: 1 spray by Nasal route as needed  13   Magalys Hsieh MD     Allergies:   Lisinopril    Social History     Socioeconomic History    Marital status:      Spouse name: Liam Brown Number of children: 3    Years of education: Not on file    Highest education level: Not on file   Occupational History    Occupation: Retired-    Tobacco Use    Smoking status: Former Smoker     Packs/day: 0.50     Years: 3.00     Pack years: 1.50     Types: Cigarettes     Start date: 1967     Quit date: 1970     Years since quittin.8    Smokeless tobacco: Never Used    Tobacco comment: counseled on tobacco exposure avoidance   Vaping Use    Vaping Use: Never used   Substance and Sexual Activity    Alcohol use:  Yes     Alcohol/week: 0.0 standard drinks     Comment: 2-3 drinks 5 days per week beer or gin    Drug use: No    Sexual activity: Yes     Partners: Female   Other Topics Concern    Not on file   Social History Narrative    Not on file     Social Determinants of Health     Financial Resource Strain:     Difficulty of Paying Living Expenses:    Food Insecurity:     Worried About Running Out of Food in the Last Year:     [address] in the Last Year:    Transportation Needs:     Lack of Transportation (Medical):  Lack of Transportation (Non-Medical):    Physical Activity:     Days of Exercise per Week:     Minutes of Exercise per Session:    Stress:     Feeling of Stress :    Social Connections:     Frequency of Communication with Friends and Family:     Frequency of Social Gatherings with Friends and Family:     Attends Confucianist Services:     Active Member of Clubs or Organizations:     Attends Club or Organization Meetings:     Marital Status:    Intimate Partner Violence:     Fear of Current or Ex-Partner:     Emotionally Abused:     Physically Abused:     Sexually Abused:      Family History   Problem Relation Age of Onset    Heart Disease Mother     High Blood Pressure Mother     High Blood Pressure Father          PHYSICAL EXAM:      BP (!) 159/92   Pulse 80   Temp 96.6 °F (35.9 °C) (Temporal)   Resp 16   Ht 6' 1\" (1.854 m)   Wt 223 lb (101.2 kg)   SpO2 95%   BMI 29.42 kg/m²  I        Heart:normal    Lungs: normal    Abdomen: normal      ASA Grade:  See anesthesia note      ASSESSMENT AND PLAN:    1. Procedure options, risks and benefits reviewed with patient and expresses understanding.

## 2021-10-28 NOTE — ANESTHESIA POSTPROCEDURE EVALUATION
Department of Anesthesiology  Postprocedure Note    Patient: Cruz Chanel  MRN: [id number]  YOB: 1945  Date of evaluation: 10/28/2021  Time:  7:28 AM     Procedure Summary     Date: 10/28/21 Room / Location: [address] [place] [address]    Anesthesia Start: 0703 Anesthesia Stop: 8919    Procedure: COLONOSCOPY WITH BIOPSY (N/A ) Diagnosis:       History of colon polyps      (History of colon polyps)    Surgeons: Junior Perez MD Responsible Provider:     Anesthesia Type: MAC ASA Status: 2          Anesthesia Type: MAC    Ana Phase I: Ana Score: 10    Ana Phase II:      Last vitals: Reviewed and per EMR flowsheets. Anesthesia Post Evaluation    Patient location during evaluation: PACU  Patient participation: complete - patient participated  Level of consciousness: awake and alert  Airway patency: patent  Nausea & Vomiting: no nausea and no vomiting  Complications: no  Cardiovascular status: hemodynamically stable and blood pressure returned to baseline  Respiratory status: spontaneous ventilation, nonlabored ventilation and room air  Hydration status: stable  Comments: Mr. Tim [place] resting comfortably upon arrival to PACU, conversing with staff. He does not report any significant pain or nausea at this time. Appropriate for discharge home with , who is present at bedside.

## 2021-11-08 LAB — PROSTATE SPECIFIC ANTIGEN: NORMAL

## 2021-12-08 DIAGNOSIS — I10 ESSENTIAL HYPERTENSION: ICD-10-CM

## 2021-12-09 RX ORDER — AMLODIPINE BESYLATE 5 MG/1
TABLET ORAL
Qty: 90 TABLET | Refills: 1 | Status: SHIPPED | OUTPATIENT
Start: 2021-12-09 | End: 2022-03-28 | Stop reason: SDUPTHER

## 2022-02-10 RX ORDER — ATORVASTATIN CALCIUM 80 MG/1
TABLET, FILM COATED ORAL
Qty: 90 TABLET | Refills: 1 | Status: SHIPPED | OUTPATIENT
Start: 2022-02-10 | End: 2022-07-28

## 2022-03-07 DIAGNOSIS — I10 ESSENTIAL HYPERTENSION: ICD-10-CM

## 2022-03-07 DIAGNOSIS — K21.9 GASTROESOPHAGEAL REFLUX DISEASE: ICD-10-CM

## 2022-03-07 RX ORDER — ESOMEPRAZOLE MAGNESIUM 40 MG/1
CAPSULE, DELAYED RELEASE ORAL
Qty: 90 CAPSULE | Refills: 0 | Status: SHIPPED | OUTPATIENT
Start: 2022-03-07 | End: 2022-03-28 | Stop reason: SDUPTHER

## 2022-03-07 RX ORDER — LOSARTAN POTASSIUM 100 MG/1
TABLET ORAL
Qty: 90 TABLET | Refills: 0 | Status: SHIPPED | OUTPATIENT
Start: 2022-03-07 | End: 2022-03-28 | Stop reason: SDUPTHER

## 2022-03-28 ENCOUNTER — OFFICE VISIT (OUTPATIENT)
Dept: FAMILY MEDICINE CLINIC | Age: 77
End: 2022-03-28
Payer: MEDICARE

## 2022-03-28 VITALS
OXYGEN SATURATION: 98 % | DIASTOLIC BLOOD PRESSURE: 74 MMHG | WEIGHT: 230.2 LBS | HEIGHT: 73 IN | SYSTOLIC BLOOD PRESSURE: 136 MMHG | HEART RATE: 79 BPM | RESPIRATION RATE: 14 BRPM | TEMPERATURE: 98.1 F | BODY MASS INDEX: 30.51 KG/M2

## 2022-03-28 DIAGNOSIS — I71.40 ABDOMINAL AORTIC ANEURYSM (AAA) WITHOUT RUPTURE: ICD-10-CM

## 2022-03-28 DIAGNOSIS — I10 ESSENTIAL HYPERTENSION: ICD-10-CM

## 2022-03-28 DIAGNOSIS — K21.9 GASTROESOPHAGEAL REFLUX DISEASE, UNSPECIFIED WHETHER ESOPHAGITIS PRESENT: ICD-10-CM

## 2022-03-28 DIAGNOSIS — R73.03 PREDIABETES: ICD-10-CM

## 2022-03-28 DIAGNOSIS — E78.00 HYPERCHOLESTEREMIA: Primary | ICD-10-CM

## 2022-03-28 PROCEDURE — 99214 OFFICE O/P EST MOD 30 MIN: CPT | Performed by: NURSE PRACTITIONER

## 2022-03-28 RX ORDER — ESOMEPRAZOLE MAGNESIUM 40 MG/1
CAPSULE, DELAYED RELEASE ORAL
Qty: 90 CAPSULE | Refills: 1 | Status: SHIPPED | OUTPATIENT
Start: 2022-03-28 | End: 2022-10-24

## 2022-03-28 RX ORDER — AMLODIPINE BESYLATE 5 MG/1
TABLET ORAL
Qty: 90 TABLET | Refills: 1 | Status: SHIPPED | OUTPATIENT
Start: 2022-03-28 | End: 2022-10-24

## 2022-03-28 RX ORDER — LOSARTAN POTASSIUM 100 MG/1
TABLET ORAL
Qty: 90 TABLET | Refills: 1 | Status: SHIPPED | OUTPATIENT
Start: 2022-03-28 | End: 2022-10-24

## 2022-03-28 ASSESSMENT — PATIENT HEALTH QUESTIONNAIRE - PHQ9
5. POOR APPETITE OR OVEREATING: 0
2. FEELING DOWN, DEPRESSED OR HOPELESS: 0
SUM OF ALL RESPONSES TO PHQ QUESTIONS 1-9: 0
3. TROUBLE FALLING OR STAYING ASLEEP: 0
SUM OF ALL RESPONSES TO PHQ QUESTIONS 1-9: 0
7. TROUBLE CONCENTRATING ON THINGS, SUCH AS READING THE NEWSPAPER OR WATCHING TELEVISION: 0
9. THOUGHTS THAT YOU WOULD BE BETTER OFF DEAD, OR OF HURTING YOURSELF: 0
8. MOVING OR SPEAKING SO SLOWLY THAT OTHER PEOPLE COULD HAVE NOTICED. OR THE OPPOSITE, BEING SO FIGETY OR RESTLESS THAT YOU HAVE BEEN MOVING AROUND A LOT MORE THAN USUAL: 0
SUM OF ALL RESPONSES TO PHQ QUESTIONS 1-9: 0
4. FEELING TIRED OR HAVING LITTLE ENERGY: 0
SUM OF ALL RESPONSES TO PHQ9 QUESTIONS 1 & 2: 0
10. IF YOU CHECKED OFF ANY PROBLEMS, HOW DIFFICULT HAVE THESE PROBLEMS MADE IT FOR YOU TO DO YOUR WORK, TAKE CARE OF THINGS AT HOME, OR GET ALONG WITH OTHER PEOPLE: 0
1. LITTLE INTEREST OR PLEASURE IN DOING THINGS: 0
6. FEELING BAD ABOUT YOURSELF - OR THAT YOU ARE A FAILURE OR HAVE LET YOURSELF OR YOUR FAMILY DOWN: 0
SUM OF ALL RESPONSES TO PHQ QUESTIONS 1-9: 0

## 2022-03-28 ASSESSMENT — ENCOUNTER SYMPTOMS
DIARRHEA: 0
SHORTNESS OF BREATH: 0
VOMITING: 0
COUGH: 0
CHEST TIGHTNESS: 0
CONSTIPATION: 0
NAUSEA: 0

## 2022-03-28 NOTE — PROGRESS NOTES
3/28/2022    This is a 77 y.o. male   Chief Complaint   Patient presents with    Hypertension     bp has been running a little. Yung Aguilar is a 77 y.o. male here to f/u on HTN, HLD, prediabetes, aaa, gerd, chuckie. Chronic GERD- stable on daily PPI. Sometimes he will need to take BID. Has not been able to wean off in the past due to recurrent symptoms. AAA--ultrasound done in Dec 2020 --no change --no sx    CHUCKIE--daily flonase and zyrtec-d     Glaucoma--seeing eye dr for this--borderline pressures per pt    [place] polyps--done 3/16, 10/21- recall 5 years     ED--stable on cialis prn--gets from urology    Prostate ca-s/p prostatectomy-seeing dr Shazia Noriega recently per pt. Last PSA level was normal in 2021    Hypertension:  Home blood pressure monitorin-152/91-98. He is adherent to a low sodium diet. Patient denies chest pain, shortness of breath, peripheral edema, palpitations and dry cough. Antihypertensive medication side effects: no medication side effects noted. Had dry cough on lisinopril. Cough resolved on losartan. Hyperlipidemia:  No new myalgias or GI upset on atorvastatin (Lipitor). Prediabetes: diet- none; exercise- tries to stay active; walking couple of times per week. Going to the [facility] couple of times per week.      Lab Results   Component Value Date    LABA1C 5.5 2021    LABA1C 5.7 2021    LABA1C 5.6 2019     Lab Results   Component Value Date    LABMICR YES 2019    CREATININE 0.7 (L) 2021     Lab Results   Component Value Date    ALT 26 2021    AST 30 2021     Lab Results   Component Value Date    CHOL 142 2019    TRIG 82 2019    HDL 54 2021    LDLCALC 129 (H) 2021           Patient Active Problem List   Diagnosis    History of adenocarcinoma of prostate    Blindness of left eye    AAA (abdominal aortic aneurysm)- 3.9cm 2020, repeat 2 yr    Diverticula, colon    Chronic non-seasonal allergic rhinitis    Glaucoma-sees dr Juana Joel BPH w/o urinary obs/LUTS--s/p prostatectomy    Hypercholesteremia    GERD (gastroesophageal reflux disease)--on daily ppi    Colonic polyp-(last colo 3/16--pos-repeat 3-5yrs)-dr mccarty    ED (erectile dysfunction)--started on daily cialis by dr Royal Olson    Incisional hernia, without obstruction or gangrene    Prediabetes    Essential hypertension       Current Outpatient Medications   Medication Sig Dispense Refill    losartan (COZAAR) 100 MG tablet TAKE 1 TABLET BY MOUTH EVERY DAY 90 tablet 0    esomeprazole (NEXIUM) 40 MG delayed release capsule TAKE 1 CAPSULE BY MOUTH EVERY DAY IN THE MORNING BEFORE BREAKFAST 90 capsule 0    atorvastatin (LIPITOR) 80 MG tablet TAKE 1 TABLET BY MOUTH EVERY DAY 90 tablet 1    amLODIPine (NORVASC) 5 MG tablet TAKE 1 TABLET BY MOUTH EVERY DAY 90 tablet 1    amoxicillin (AMOXIL) 500 MG capsule TAKE 4 CAPSULES BY MOUTH ONE HOUR BEFORE YOUR APPOINTMENT      cetirizine-psuedoephedrine (ZYRTEC-D) 5-120 MG per extended release tablet Take 1 tablet by mouth 2 times daily      polyethylene glycol (GLYCOLAX) powder Take 17 g by mouth daily      tadalafil (CIALIS) 20 MG tablet Take 20 mg by mouth as needed for Erectile Dysfunction      hydrocortisone 1 % cream Apply topically as needed Apply topically 2 times daily.  fluticasone (FLONASE) 50 MCG/ACT nasal spray 1 spray by Nasal route daily. (Patient taking differently: 1 spray by Nasal route as needed ) 3 Bottle 3    aspirin EC 81 MG EC tablet Take 81 mg by mouth daily.  brimonidine-timolol (COMBIGAN) 0.2-0.5 % ophthalmic solution Place 1 drop into the right eye every 12 hours. No current facility-administered medications for this visit. Allergies   Allergen Reactions    Lisinopril Other (See Comments)     cough       Review of Systems   Constitutional: Negative for activity change, fatigue and fever. Respiratory: Negative for cough, chest tightness and shortness of breath. Cardiovascular: Negative for chest pain and leg swelling. Gastrointestinal: Negative for constipation, diarrhea, nausea and vomiting. Neurological: Negative for dizziness, syncope, weakness and headaches. Psychiatric/Behavioral: Negative for confusion. Vitals:    03/28/22 0840   BP: 136/74   Site: Right Upper Arm   Position: Sitting   Cuff Size: Large Adult   Pulse: 79   Resp: 14   Temp: 98.1 °F (36.7 °C)   TempSrc: Oral   SpO2: 98%   Weight: 230 lb 3.2 oz (104.4 kg)   Height: 6' 1\" (1.854 m)       Body mass index is 30.37 kg/m². Wt Readings from Last 3 Encounters:   03/28/22 230 lb 3.2 oz (104.4 kg)   10/28/21 223 lb (101.2 kg)   09/28/21 224 lb (101.6 kg)       BP Readings from Last 3 Encounters:   03/28/22 136/74   10/28/21 115/80   10/28/21 131/79       Physical Exam  Vitals and nursing note reviewed. Constitutional:       General: He is not in acute distress. Appearance: He is well-developed. HENT:      Head: Normocephalic and atraumatic. Eyes:      Extraocular Movements: Extraocular movements intact. Conjunctiva/sclera: Conjunctivae normal.   Cardiovascular:      Rate and Rhythm: Normal rate and regular rhythm. Heart sounds: Normal heart sounds. No murmur heard. No friction rub. No gallop. Pulmonary:      Effort: Pulmonary effort is normal. No respiratory distress. Breath sounds: Normal breath sounds. Musculoskeletal:      Cervical back: Neck supple. Right lower leg: No edema. Left lower leg: No edema. Skin:     General: Skin is warm and dry. Neurological:      Mental Status: He is alert and oriented to person, place, and time. Psychiatric:         Behavior: Behavior normal.         Thought Content: Thought content normal.         Judgment: Judgment normal.         Assessment and Plan  Ash Vidales was seen today for check-up.     Diagnoses and all orders for this visit:    Hypercholesteremia  -     Comprehensive Metabolic Panel; Future  -     Lipids checked 3/2021- repeat   On atorvastatin 80 mg daily. Diet and weight loss discussed and needed. Prediabetes  -     Hemoglobin A1C; Future  Low-carb diet, aerobic exercise, and weight loss discussed in needed. Gastroesophageal reflux disease, esophagitis presence not specified: Stable on Nexium 40 mg daily. He has not been able to wean off medication in the past due to recurrent symptoms. Advised to avoid foods that cause reflux. Abdominal aortic aneurysm (AAA) without rupture (HCC)  Asymptomatic. Recheck ultrasound Dec 2022. Essential hypertension: On losartan 100 mg daily and amlodipine 5 mg daily. Has higher home readings. He will work on diet and weight loss and f/u in 3 months to repeat BP. Low-salt diet, aerobic exercise, and weight loss discussed in needed. Chronic non-seasonal allergic rhinitis, unspecified trigger  Stable. Continue flonase and zyrtec. Return in about 3 months (around 6/28/2022), or if symptoms worsen or fail to improve, for AWV.

## 2022-03-29 DIAGNOSIS — R73.03 PREDIABETES: ICD-10-CM

## 2022-03-29 DIAGNOSIS — K21.9 GASTROESOPHAGEAL REFLUX DISEASE, UNSPECIFIED WHETHER ESOPHAGITIS PRESENT: ICD-10-CM

## 2022-03-29 DIAGNOSIS — E78.00 HYPERCHOLESTEREMIA: ICD-10-CM

## 2022-03-29 LAB
A/G RATIO: 1.6 (ref 1.1–2.2)
ALBUMIN SERPL-MCNC: 4.1 G/DL (ref 3.4–5)
ALP BLD-CCNC: 69 U/L (ref 40–129)
ALT SERPL-CCNC: 26 U/L (ref 10–40)
ANION GAP SERPL CALCULATED.3IONS-SCNC: 14 MMOL/L (ref 3–16)
AST SERPL-CCNC: 28 U/L (ref 15–37)
BILIRUB SERPL-MCNC: 0.4 MG/DL (ref 0–1)
BUN BLDV-MCNC: 12 MG/DL (ref 7–20)
CALCIUM SERPL-MCNC: 9.3 MG/DL (ref 8.3–10.6)
CHLORIDE BLD-SCNC: 106 MMOL/L (ref 99–110)
CHOLESTEROL, TOTAL: 203 MG/DL (ref 0–199)
CO2: 23 MMOL/L (ref 21–32)
CREAT SERPL-MCNC: 0.7 MG/DL (ref 0.8–1.3)
ESTIMATED AVERAGE GLUCOSE: 114 MG/DL
GFR AFRICAN AMERICAN: >60
GFR NON-AFRICAN AMERICAN: >60
GLUCOSE BLD-MCNC: 80 MG/DL (ref 70–99)
HBA1C MFR BLD: 5.6 %
HDLC SERPL-MCNC: 62 MG/DL (ref 40–60)
LDL CHOLESTEROL CALCULATED: 119 MG/DL
POTASSIUM SERPL-SCNC: 4.1 MMOL/L (ref 3.5–5.1)
SODIUM BLD-SCNC: 143 MMOL/L (ref 136–145)
TOTAL PROTEIN: 6.6 G/DL (ref 6.4–8.2)
TRIGL SERPL-MCNC: 108 MG/DL (ref 0–150)
VLDLC SERPL CALC-MCNC: 22 MG/DL

## 2022-06-20 ENCOUNTER — OFFICE VISIT (OUTPATIENT)
Dept: FAMILY MEDICINE CLINIC | Age: 77
End: 2022-06-20
Payer: MEDICARE

## 2022-06-20 VITALS
WEIGHT: 225.8 LBS | HEIGHT: 73 IN | DIASTOLIC BLOOD PRESSURE: 74 MMHG | SYSTOLIC BLOOD PRESSURE: 130 MMHG | TEMPERATURE: 98.4 F | RESPIRATION RATE: 16 BRPM | OXYGEN SATURATION: 94 % | BODY MASS INDEX: 29.93 KG/M2 | HEART RATE: 73 BPM

## 2022-06-20 DIAGNOSIS — R73.03 PREDIABETES: ICD-10-CM

## 2022-06-20 DIAGNOSIS — Z00.00 MEDICARE ANNUAL WELLNESS VISIT, SUBSEQUENT: Primary | ICD-10-CM

## 2022-06-20 DIAGNOSIS — K21.9 GASTROESOPHAGEAL REFLUX DISEASE, UNSPECIFIED WHETHER ESOPHAGITIS PRESENT: ICD-10-CM

## 2022-06-20 DIAGNOSIS — E78.00 HYPERCHOLESTEREMIA: ICD-10-CM

## 2022-06-20 DIAGNOSIS — I10 ESSENTIAL HYPERTENSION: ICD-10-CM

## 2022-06-20 DIAGNOSIS — I71.40 ABDOMINAL AORTIC ANEURYSM (AAA) WITHOUT RUPTURE: ICD-10-CM

## 2022-06-20 PROCEDURE — 1123F ACP DISCUSS/DSCN MKR DOCD: CPT | Performed by: NURSE PRACTITIONER

## 2022-06-20 PROCEDURE — G0439 PPPS, SUBSEQ VISIT: HCPCS | Performed by: NURSE PRACTITIONER

## 2022-06-20 ASSESSMENT — PATIENT HEALTH QUESTIONNAIRE - PHQ9
SUM OF ALL RESPONSES TO PHQ QUESTIONS 1-9: 0
SUM OF ALL RESPONSES TO PHQ QUESTIONS 1-9: 0
2. FEELING DOWN, DEPRESSED OR HOPELESS: 0
1. LITTLE INTEREST OR PLEASURE IN DOING THINGS: 0
SUM OF ALL RESPONSES TO PHQ QUESTIONS 1-9: 0
SUM OF ALL RESPONSES TO PHQ QUESTIONS 1-9: 0
SUM OF ALL RESPONSES TO PHQ9 QUESTIONS 1 & 2: 0

## 2022-06-20 ASSESSMENT — LIFESTYLE VARIABLES
HOW OFTEN DURING THE LAST YEAR HAVE YOU FAILED TO DO WHAT WAS NORMALLY EXPECTED FROM YOU BECAUSE OF DRINKING: 0
HOW OFTEN DURING THE LAST YEAR HAVE YOU NEEDED AN ALCOHOLIC DRINK FIRST THING IN THE MORNING TO GET YOURSELF GOING AFTER A NIGHT OF HEAVY DRINKING: 0
HOW OFTEN DURING THE LAST YEAR HAVE YOU FOUND THAT YOU WERE NOT ABLE TO STOP DRINKING ONCE YOU HAD STARTED: 0
HOW OFTEN DURING THE LAST YEAR HAVE YOU HAD A FEELING OF GUILT OR REMORSE AFTER DRINKING: 0
HOW OFTEN DO YOU HAVE A DRINK CONTAINING ALCOHOL: 4 OR MORE TIMES A WEEK
HOW OFTEN DURING THE LAST YEAR HAVE YOU BEEN UNABLE TO REMEMBER WHAT HAPPENED THE NIGHT BEFORE BECAUSE YOU HAD BEEN DRINKING: 0
HOW MANY STANDARD DRINKS CONTAINING ALCOHOL DO YOU HAVE ON A TYPICAL DAY: 1 OR 2
HAVE YOU OR SOMEONE ELSE BEEN INJURED AS A RESULT OF YOUR DRINKING: 0
HAS A RELATIVE, FRIEND, DOCTOR, OR ANOTHER HEALTH PROFESSIONAL EXPRESSED CONCERN ABOUT YOUR DRINKING OR SUGGESTED YOU CUT DOWN: 0

## 2022-06-20 NOTE — PROGRESS NOTES
Medicare Annual Wellness Visit    Ariel Ma is here for Medicare AWV (question about 2nd covid booster)    Assessment & Plan   Medicare annual wellness visit, subsequent  Healthy lifestyles reviewed: Diet, aerobic exercise, sunscreen, vision and dental exams. Patient will obtain his COVID-19 booster by this fall. Essential hypertension  Controlled current medications. Hypercholesteremia  Lipids stable March 2022. Continue atorvastatin 80 mg daily    Prediabetes  Continue to work on Bit9, aerobic exercise, and weight loss. Gastroesophageal reflux disease, unspecified whether esophagitis present  Stable on Nexium 40 mg daily. Abdominal aortic aneurysm (AAA) without rupture ([address]  -     US ABDOMINAL AORTA LIMITED; Future  Last ultrasound was - will repeat in 2 years 12/2022     Recommendations for Preventive Services Due: see orders and patient instructions/AVS.  Recommended screening schedule for the next 5-10 years is provided to the patient in written form: see Patient Instructions/AVS.     Return in 6 months (on 12/20/2022), or if symptoms worsen or fail to improve, for chronic conditions. Subjective       Hypertension:  Home blood pressure monitoring: Yes - 136/92 . He is adherent to a low sodium diet. Patient denies chest pain, shortness of breath, headache, peripheral edema, palpitations and dry cough. Antihypertensive medication side effects: no medication side effects noted. Hyperlipidemia:  No new myalgias or GI upset on atorvastatin (Lipitor).        Lab Results   Component Value Date    LABA1C 5.6 03/29/2022    LABA1C 5.5 09/28/2021    LABA1C 5.7 03/29/2021     Lab Results   Component Value Date    LABMICR YES 08/07/2019    CREATININE 0.7 (L) 03/29/2022     Lab Results   Component Value Date    ALT 26 03/29/2022    AST 28 03/29/2022     Lab Results   Component Value Date    CHOL 203 (H) 03/29/2022    TRIG 108 03/29/2022    HDL 62 (H) 03/29/2022    LDLCALC 119 (H) 03/29/2022        Patient's complete Health Risk Assessment and screening values have been reviewed and are found in Flowsheets. The following problems were reviewed today and where indicated follow up appointments were made and/or referrals ordered. Positive Risk Factor Screenings with Interventions:               General Health and ACP:  General  In general, how would you say your health is?: Good  In the past 7 days, have you experienced any of the following: New or Increased Pain, New or Increased Fatigue, Loneliness, Social Isolation, Stress or Anger?: No  Do you get the social and emotional support that you need?: Yes  Do you have a Living Will?: Yes    Advance Directives     Power of  Living Will ACP-Advance Directive ACP-Power of     Not on File Filed on 05/09/17 Filed Not on File      General Health Risk Interventions:  · Brought in copy of Power "[employer] [facility] [address] Habits/Nutrition Interventions:  · Inadequate physical activity:  patient agrees to exercise for at least 150 minutes/week             Objective   Vitals:    06/20/22 0836   BP: 130/74   Site: Right Upper Arm   Position: Sitting   Cuff Size: Large Adult   Pulse: 73   Resp: 16   Temp: 98.4 °F (36.9 °C)   TempSrc: Oral   SpO2: 94%   Weight: 225 lb 12.8 oz (102.4 kg)   Height: 6' 1\" (1.854 m)      Body mass index is 29.79 kg/m².         General Appearance: alert and oriented to person, place and time, well developed and well- nourished, in no acute distress  Skin: warm and dry, no rash or erythema  Head: normocephalic and atraumatic  Eyes: pupils equal, round, and reactive to light, extraocular eye movements intact, conjunctivae normal  Pulmonary/Chest: clear to auscultation bilaterally- no wheezes, rales or rhonchi, normal air movement, no respiratory distress  Cardiovascular: normal rate, regular rhythm, normal S1 and S2, no murmurs, rubs, clicks, or gallops, distal pulses intact, no carotid bruits  Extremities: no cyanosis, clubbing or edema       Allergies   Allergen Reactions    Lisinopril Other (See Comments)     cough     Prior to Visit Medications    Medication Sig Taking? Authorizing Provider   losartan (COZAAR) 100 MG tablet TAKE 1 TABLET BY MOUTH EVERY DAY Yes ROSAMARIA Valencia CNP   esomeprazole (NEXIUM) 40 MG delayed release capsule TAKE 1 CAPSULE BY MOUTH EVERY DAY IN THE MORNING BEFORE BREAKFAST Yes ROSAMARIA Valencia CNP   amLODIPine (NORVASC) 5 MG tablet TAKE 1 TABLET BY MOUTH EVERY DAY Yes ROSAMARIA Valencia CNP   atorvastatin (LIPITOR) 80 MG tablet TAKE 1 TABLET BY MOUTH EVERY DAY Yes ROSAMARIA Valencia CNP   cetirizine-psuedoephedrine (ZYRTEC-D) 5-120 MG per extended release tablet Take 1 tablet by mouth 2 times daily Yes Historical Provider, MD   polyethylene glycol (GLYCOLAX) powder Take 17 g by mouth daily Yes Historical Provider, MD   tadalafil (CIALIS) 20 MG tablet Take 20 mg by mouth as needed for Erectile Dysfunction Yes Historical Provider, MD   hydrocortisone 1 % cream Apply topically as needed Apply topically 2 times daily. Yes Historical Provider, MD   fluticasone (FLONASE) 50 MCG/ACT nasal spray 1 spray by Nasal route daily. Patient taking differently: 1 spray by Nasal route as needed  Yes Geno Shea MD   aspirin EC 81 MG EC tablet Take 81 mg by mouth daily. Yes Historical Provider, MD   brimonidine-timolol (COMBIGAN) 0.2-0.5 % ophthalmic solution Place 1 drop into the right eye every 12 hours.    Yes Kaela Brown MD   amoxicillin (AMOXIL) 500 MG capsule TAKE 4 CAPSULES BY MOUTH ONE HOUR BEFORE YOUR APPOINTMENT  Patient not taking: Reported on 6/20/2022  Historical Provider, MD [employer] (Including outside providers/suppliers regularly involved in providing care):   Patient Care Team:  ROSAMARIA Valencia CNP as PCP - General (Nurse Practitioner)  ROSAMARIA Valencia CNP [facility] P[facility]  Ethan Pat MD as Consulting Physician (Urology)     Reviewed and updated this visit:  Tobacco  Allergies  Meds  Med Hx  Surg Hx  Soc Hx  Fam Hx

## 2022-06-20 NOTE — PATIENT INSTRUCTIONS
Personalized Preventive Plan for Cirilo Green - 6/20/2022  Medicare offers a range of preventive health benefits. Some of the tests and screenings are paid in full while other may be subject to a deductible, co-insurance, and/or copay. Some of these benefits include a comprehensive review of your medical history including lifestyle, illnesses that may run in your family, and various assessments and screenings as appropriate. After reviewing your medical record and screening and assessments performed today your provider may have ordered immunizations, labs, imaging, and/or referrals for you. A list of these orders (if applicable) as well as your Preventive Care list are included within your After Visit Summary for your review. Other Preventive Recommendations:    · A preventive eye exam performed by an eye specialist is recommended every 1-2 years to screen for glaucoma; cataracts, macular degeneration, and other eye disorders. · A preventive dental visit is recommended every 6 months. · Try to get at least 150 minutes of exercise per week or 10,000 steps per day on a pedometer . · Order or download the FREE \"Exercise & Physical Activity: Your Everyday Guide\" from [employer]. Call 1-584.536.3614 or search [employer] online. · You need 8206-4662 mg of calcium and 4865-3347 IU of vitamin D per day. It is possible to meet your calcium requirement with diet alone, but a vitamin D supplement is usually necessary to meet this goal.  · When exposed to the sun, use a sunscreen that protects against both UVA and UVB radiation with an SPF of 30 or greater. Reapply every 2 to 3 hours or after sweating, drying off with a towel, or swimming. · Always wear a seat belt when traveling in a car. Always wear a helmet when riding a bicycle or motorcycle.

## 2022-07-28 RX ORDER — ATORVASTATIN CALCIUM 80 MG/1
TABLET, FILM COATED ORAL
Qty: 90 TABLET | Refills: 1 | Status: SHIPPED | OUTPATIENT
Start: 2022-07-28

## 2022-10-17 ENCOUNTER — TELEPHONE (OUTPATIENT)
Dept: FAMILY MEDICINE CLINIC | Age: 77
End: 2022-10-17

## 2022-10-17 DIAGNOSIS — I71.40 ABDOMINAL AORTIC ANEURYSM (AAA) WITHOUT RUPTURE, UNSPECIFIED PART: Primary | ICD-10-CM

## 2022-10-17 NOTE — TELEPHONE ENCOUNTER
Central scheduling calling. Pt called in to schedule US Abdominal Aorta Limited but scheduling did not see order. Advised there was a referral placed on 6.20.2022 but was cancelled on 9.30.2022.     Please advise if pt is still needing imaging completed  Central Scheduling stated that pt can be reached at [phone]

## 2022-10-17 NOTE — TELEPHONE ENCOUNTER
It looks like the order has been cancelled. Can you put the order in again if this imaging is needed?   Thanks

## 2022-10-19 ENCOUNTER — NURSE ONLY (OUTPATIENT)
Dept: FAMILY MEDICINE CLINIC | Age: 77
End: 2022-10-19
Payer: MEDICARE

## 2022-10-19 DIAGNOSIS — Z23 NEED FOR INFLUENZA VACCINATION: Primary | ICD-10-CM

## 2022-10-19 PROCEDURE — 90694 VACC AIIV4 NO PRSRV 0.5ML IM: CPT | Performed by: NURSE PRACTITIONER

## 2022-10-19 PROCEDURE — G0008 ADMIN INFLUENZA VIRUS VAC: HCPCS | Performed by: NURSE PRACTITIONER

## 2022-10-23 DIAGNOSIS — I10 ESSENTIAL HYPERTENSION: ICD-10-CM

## 2022-10-23 DIAGNOSIS — K21.9 GASTROESOPHAGEAL REFLUX DISEASE, UNSPECIFIED WHETHER ESOPHAGITIS PRESENT: ICD-10-CM

## 2022-10-24 RX ORDER — LOSARTAN POTASSIUM 100 MG/1
TABLET ORAL
Qty: 90 TABLET | Refills: 1 | Status: SHIPPED | OUTPATIENT
Start: 2022-10-24

## 2022-10-24 RX ORDER — ESOMEPRAZOLE MAGNESIUM 40 MG/1
CAPSULE, DELAYED RELEASE ORAL
Qty: 90 CAPSULE | Refills: 1 | Status: SHIPPED | OUTPATIENT
Start: 2022-10-24

## 2022-10-24 RX ORDER — AMLODIPINE BESYLATE 5 MG/1
TABLET ORAL
Qty: 90 TABLET | Refills: 1 | Status: SHIPPED | OUTPATIENT
Start: 2022-10-24

## 2022-11-11 LAB — PROSTATE SPECIFIC ANTIGEN: <0.01 NG/ML (ref 0–4)

## 2022-12-08 ENCOUNTER — HOSPITAL ENCOUNTER (OUTPATIENT)
Dept: ULTRASOUND IMAGING | Age: 77
Discharge: HOME | End: 2022-12-08
Payer: MEDICARE

## 2022-12-08 DIAGNOSIS — I71.40 ABDOMINAL AORTIC ANEURYSM (AAA) WITHOUT RUPTURE, UNSPECIFIED PART: ICD-10-CM

## 2022-12-08 PROCEDURE — 76775 US EXAM ABDO BACK WALL LIM: CPT

## 2022-12-12 ENCOUNTER — OFFICE VISIT (OUTPATIENT)
Dept: FAMILY MEDICINE CLINIC | Age: 77
End: 2022-12-12
Payer: MEDICARE

## 2022-12-12 VITALS
OXYGEN SATURATION: 95 % | RESPIRATION RATE: 14 BRPM | HEART RATE: 61 BPM | SYSTOLIC BLOOD PRESSURE: 128 MMHG | DIASTOLIC BLOOD PRESSURE: 76 MMHG | WEIGHT: 227.2 LBS | BODY MASS INDEX: 30.11 KG/M2 | TEMPERATURE: 98.4 F | HEIGHT: 73 IN

## 2022-12-12 DIAGNOSIS — K21.9 GASTROESOPHAGEAL REFLUX DISEASE, UNSPECIFIED WHETHER ESOPHAGITIS PRESENT: ICD-10-CM

## 2022-12-12 DIAGNOSIS — E78.00 HYPERCHOLESTEREMIA: ICD-10-CM

## 2022-12-12 DIAGNOSIS — E78.00 HYPERCHOLESTEREMIA: Primary | ICD-10-CM

## 2022-12-12 DIAGNOSIS — R73.03 PREDIABETES: ICD-10-CM

## 2022-12-12 DIAGNOSIS — I10 ESSENTIAL HYPERTENSION: ICD-10-CM

## 2022-12-12 DIAGNOSIS — I71.40 ABDOMINAL AORTIC ANEURYSM (AAA) WITHOUT RUPTURE, UNSPECIFIED PART: ICD-10-CM

## 2022-12-12 LAB
A/G RATIO: 1.6 (ref 1.1–2.2)
ALBUMIN SERPL-MCNC: 4.2 G/DL (ref 3.4–5)
ALP BLD-CCNC: 76 U/L (ref 40–129)
ALT SERPL-CCNC: 27 U/L (ref 10–40)
ANION GAP SERPL CALCULATED.3IONS-SCNC: 10 MMOL/L (ref 3–16)
AST SERPL-CCNC: 28 U/L (ref 15–37)
BILIRUB SERPL-MCNC: 0.6 MG/DL (ref 0–1)
BUN BLDV-MCNC: 12 MG/DL (ref 7–20)
CALCIUM SERPL-MCNC: 9.8 MG/DL (ref 8.3–10.6)
CHLORIDE BLD-SCNC: 102 MMOL/L (ref 99–110)
CO2: 27 MMOL/L (ref 21–32)
CREAT SERPL-MCNC: 0.6 MG/DL (ref 0.8–1.3)
GFR SERPL CREATININE-BSD FRML MDRD: >60 ML/MIN/{1.73_M2}
GLUCOSE BLD-MCNC: 98 MG/DL (ref 70–99)
POTASSIUM SERPL-SCNC: 4.5 MMOL/L (ref 3.5–5.1)
SODIUM BLD-SCNC: 139 MMOL/L (ref 136–145)
TOTAL PROTEIN: 6.8 G/DL (ref 6.4–8.2)

## 2022-12-12 PROCEDURE — 3078F DIAST BP <80 MM HG: CPT | Performed by: NURSE PRACTITIONER

## 2022-12-12 PROCEDURE — 3074F SYST BP LT 130 MM HG: CPT | Performed by: NURSE PRACTITIONER

## 2022-12-12 PROCEDURE — 1123F ACP DISCUSS/DSCN MKR DOCD: CPT | Performed by: NURSE PRACTITIONER

## 2022-12-12 PROCEDURE — 99214 OFFICE O/P EST MOD 30 MIN: CPT | Performed by: NURSE PRACTITIONER

## 2022-12-12 SDOH — ECONOMIC STABILITY: FOOD INSECURITY: WITHIN THE PAST 12 MONTHS, YOU WORRIED THAT YOUR FOOD WOULD RUN OUT BEFORE YOU GOT MONEY TO BUY MORE.: OFTEN TRUE

## 2022-12-12 SDOH — ECONOMIC STABILITY: FOOD INSECURITY: WITHIN THE PAST 12 MONTHS, THE FOOD YOU BOUGHT JUST DIDN'T LAST AND YOU DIDN'T HAVE MONEY TO GET MORE.: OFTEN TRUE

## 2022-12-12 ASSESSMENT — ENCOUNTER SYMPTOMS
NAUSEA: 0
CHEST TIGHTNESS: 0
VOMITING: 0
DIARRHEA: 0
CONSTIPATION: 0
COUGH: 0
SHORTNESS OF BREATH: 0

## 2022-12-12 ASSESSMENT — SOCIAL DETERMINANTS OF HEALTH (SDOH): HOW HARD IS IT FOR YOU TO PAY FOR THE VERY BASICS LIKE FOOD, HOUSING, MEDICAL CARE, AND HEATING?: NOT HARD AT ALL

## 2022-12-12 NOTE — PROGRESS NOTES
2022    This is a 69 y.o. male   Chief Complaint   Patient presents with    Cholesterol Problem     No issues or concerns. Bruno PARSONS  Jose David Hernandez is a 69 y.o. male here to f/u on HTN, HLD, prediabetes, aaa, gerd, chuckie. Chronic GERD- stable on daily PPI. Sometimes he will need to take BID. Has not been able to wean off in the past due to recurrent symptoms. AAA--ultrasound done in Dec 2022 --stable --no sx    CHUCKIE--daily flonase and zyrtec-d     Glaucoma--seeing eye dr for this--borderline pressures per pt    [place] polyps--done 3/16, 10/21- recall 5 years     ED--stable on cialis prn--gets from urology    Prostate ca-s/p prostatectomy-seeing dr Susan Cisneros recently per pt. Last PSA level was normal in 2022    Hypertension:  Home blood pressure monitorin/88. He is adherent to a low sodium diet. Patient denies chest pain, shortness of breath, peripheral edema, palpitations and dry cough. Antihypertensive medication side effects: no medication side effects noted. Had dry cough on lisinopril. Cough resolved on losartan. Hyperlipidemia:  No new myalgias or GI upset on atorvastatin (Lipitor). Prediabetes: diet- none; exercise- tries to stay active; walking couple of times per week. Going to the [facility] couple of times per week.      Lab Results   Component Value Date    LABA1C 5.6 2022    LABA1C 5.5 2021    LABA1C 5.7 2021     Lab Results   Component Value Date    LABMICR YES 2019    CREATININE 0.7 (L) 2022     Lab Results   Component Value Date    ALT 26 2022    AST 28 2022     Lab Results   Component Value Date    CHOL 203 (H) 2022    TRIG 108 2022    HDL 62 (H) 2022    LDLCALC 119 (H) 2022           Patient Active Problem List   Diagnosis    History of adenocarcinoma of prostate    Blindness of left eye    AAA (abdominal aortic aneurysm)- 3.9cm 2020, 4.0 cm 2022 repeat in 1 year    Diverticula, colon    Chronic non-seasonal allergic rhinitis    Glaucoma-sees dr Rita Cazares    BPH w/o urinary obs/LUTS--s/p prostatectomy    Hypercholesteremia    GERD (gastroesophageal reflux disease)--on daily ppi    Colonic polyp-(last colo 3/16--pos-repeat 3-5yrs)-dr mccarty    ED (erectile dysfunction)--started on daily cialis by dr Chago Galvez    Incisional hernia, without obstruction or gangrene    Prediabetes    Essential hypertension       Current Outpatient Medications   Medication Sig Dispense Refill    esomeprazole (NEXIUM) 40 MG delayed release capsule TAKE 1 CAPSULE BY MOUTH EVERY DAY IN THE MORNING BEFORE BREAKFAST 90 capsule 1    losartan (COZAAR) 100 MG tablet TAKE 1 TABLET BY MOUTH EVERY DAY 90 tablet 1    amLODIPine (NORVASC) 5 MG tablet TAKE 1 TABLET BY MOUTH EVERY DAY 90 tablet 1    atorvastatin (LIPITOR) 80 MG tablet TAKE 1 TABLET BY MOUTH EVERY DAY 90 tablet 1    amoxicillin (AMOXIL) 500 MG capsule TAKE 4 CAPSULES BY MOUTH ONE HOUR BEFORE YOUR APPOINTMENT      cetirizine-psuedoephedrine (ZYRTEC-D) 5-120 MG per extended release tablet Take 1 tablet by mouth 2 times daily      polyethylene glycol (GLYCOLAX) powder Take 17 g by mouth daily      tadalafil (CIALIS) 20 MG tablet Take 20 mg by mouth as needed for Erectile Dysfunction      hydrocortisone 1 % cream Apply topically as needed Apply topically 2 times daily. fluticasone (FLONASE) 50 MCG/ACT nasal spray 1 spray by Nasal route daily. (Patient taking differently: 1 spray by Nasal route as needed) 3 Bottle 3    aspirin EC 81 MG EC tablet Take 81 mg by mouth daily. brimonidine-timolol (COMBIGAN) 0.2-0.5 % ophthalmic solution Place 1 drop into the right eye every 12 hours. No current facility-administered medications for this visit. Allergies   Allergen Reactions    Lisinopril Other (See Comments)     cough       Review of Systems   Constitutional:  Negative for activity change, fatigue and fever.    Respiratory: Negative for cough, chest tightness and shortness of breath. Cardiovascular:  Negative for chest pain and leg swelling. Gastrointestinal:  Negative for constipation, diarrhea, nausea and vomiting. Neurological:  Negative for dizziness, syncope, weakness and headaches. Psychiatric/Behavioral:  Negative for confusion. Vitals:    12/12/22 0828   BP: 128/76   Site: Right Upper Arm   Position: Sitting   Cuff Size: Medium Adult   Pulse: 61   Resp: 14   Temp: 98.4 °F (36.9 °C)   TempSrc: Oral   SpO2: 95%   Weight: 227 lb 3.2 oz (103.1 kg)   Height: 6' 1\" (1.854 m)       Body mass index is 29.98 kg/m². Wt Readings from Last 3 Encounters:   12/12/22 227 lb 3.2 oz (103.1 kg)   06/20/22 225 lb 12.8 oz (102.4 kg)   03/28/22 230 lb 3.2 oz (104.4 kg)       BP Readings from Last 3 Encounters:   12/12/22 128/76   06/20/22 130/74   03/28/22 136/74       Physical Exam  Vitals and nursing note reviewed. Constitutional:       General: He is not in acute distress. Appearance: He is well-developed. HENT:      Head: Normocephalic and atraumatic. Eyes:      Extraocular Movements: Extraocular movements intact. Conjunctiva/sclera: Conjunctivae normal.   Cardiovascular:      Rate and Rhythm: Normal rate and regular rhythm. Heart sounds: Normal heart sounds. No murmur heard. No friction rub. No gallop. Pulmonary:      Effort: Pulmonary effort is normal. No respiratory distress. Breath sounds: Normal breath sounds. Musculoskeletal:      Cervical back: Neck supple. Right lower leg: No edema. Left lower leg: No edema. Skin:     General: Skin is warm and dry. Neurological:      Mental Status: He is alert and oriented to person, place, and time. Psychiatric:         Behavior: Behavior normal.         Thought Content: Thought content normal.         Judgment: Judgment normal.       Assessment and Plan  Chris Tejeda was seen today for check-up.     Diagnoses and all orders for this visit:    Hypercholesteremia  -     Comprehensive Metabolic Panel; Future  -     Lipids checked 3/2022  On atorvastatin 80 mg daily. Diet and weight loss discussed and needed. Prediabetes  -     Hemoglobin A1C; Future  Low-carb diet, aerobic exercise, and weight loss discussed in needed. Gastroesophageal reflux disease, esophagitis presence not specified: Stable on Nexium 40 mg daily. He has not been able to wean off medication in the past due to recurrent symptoms. Advised to avoid foods that cause reflux. Abdominal aortic aneurysm (AAA) without rupture (HCC)  Asymptomatic. Recheck ultrasound Dec 2023. Essential hypertension: Controlled. On losartan 100 mg daily and amlodipine 5 mg daily. Low-salt diet, aerobic exercise, and weight loss discussed in needed. Chronic non-seasonal allergic rhinitis, unspecified trigger  Stable. Continue flonase and zyrtec. Return in about 6 months (around 6/22/2023), or if symptoms worsen or fail to improve, for AWV.

## 2022-12-13 LAB
ESTIMATED AVERAGE GLUCOSE: 111.2 MG/DL
HBA1C MFR BLD: 5.5 %

## 2023-01-20 RX ORDER — ATORVASTATIN CALCIUM 80 MG/1
TABLET, FILM COATED ORAL
Qty: 90 TABLET | Refills: 1 | Status: SHIPPED | OUTPATIENT
Start: 2023-01-20

## 2023-03-01 NOTE — PLAN OF CARE
[facility] [place]. [address] [address]  Phone: [phone]   Fax:     [phone]                                                     Physical Therapy Certification    Dear JEANETTE Tillman*,I think this patient may benefit from dry needling and may attempt it at some point. .  Please sign the following if you are in agreement with this. If you have any reservations or questions please contact me at [phone]  Thanks, Sincerely, Jihan Lacy PT      We had the pleasure of evaluating the following patient for physical therapy services at [facility]. A summary of our findings can be found in the initial assessment below. This includes our plan of care. If you have any questions or concerns regarding these findings, please do not hesitate to contact me at the office phone number checked above.   Thank you for the referral.       Physician Signature:_______________________________Date:__________________  By signing above (or electronic signature), therapists plan is approved by physician      Patient: Vilma Zhao   : 1945   MRN: [id number]  Referring Physician: JEANETTE Tillman*      Evaluation Date: 2023      Medical Diagnosis Information:  Medical Diagnosis: Achilles tendon pain [M76.60]   Treatment Diagnosis: decreased ability to ambulate and function                                     Insurance information: PT Insurance Information: [employer] medicare      Precautions/ Contra-indications:   Latex Allergy:  [x]NO      []YES  Preferred Language for Healthcare:   [x]English       []other:    C-SSRS Triggered by Intake questionnaire (Past 2 wk assessment ):   [x] No, Questionnaire did not trigger screening.   [] Yes, Patient intake triggered C-SSRS Screening      [] C-SSRS Screening completed  [] PCP notified via Epic     SUBJECTIVE: Patient is a 78 y/o male who started having right achilles tendon pain in December.  He had been walking 2 miles a day but can;t do that at the moment.  He c/o constant pain in his right achilles that is worse with walking.  He is using a brace at this time.  He says the brace helps.  He does not wake from pain.  He has not had any testing so far.  He gets worse as the day goes on.  He hopes to return to golf  and walking.      Relevant Medical History:Additional Pertinent Hx:  History of adenocarcinoma of prostate   Blindness of left eye   AAA (abdominal aortic aneurysm)- 3.9cm 12/2020, 4.0 cm 12/2022 repeat in 1 year   Diverticula, colon   Chronic non-seasonal allergic rhinitis   Glaucoma-sees dr Melendez   BPH w/o urinary obs/LUTS--s/p prostatectomy   Hypercholesteremia   GERD (gastroesophageal reflux disease)--on daily ppi   Colonic polyp-(last colo 3/16--pos-repeat 3-5yrs)-dr mccarty   ED (erectile dysfunction)--started on daily cialis by dr Pritchard-urology   Incisional hernia, without obstruction or gangrene   Prediabetes   Essential hypertension  Functional Outcome Measure: Lefi  = 33    Pain Scale: 0-7/10  Easing factors: rest  Provocative factors: walking     Type: [x]Constant   []Intermittent  []Radiating []Localized []other:     Numbness/Tingling: denies    Occupation/School:retired    Living Status/Prior Level of Function: Independent with ADLs and IADLs, ind, can't walk that far    OBJECTIVE:     Posture: er of right le, bilat pronation noted    Functional Mobility/Transfers: ind    Palpation: tight and tender in gastroc, hams, very tender with increased tissue and edema in achilles tendon and at musculotendinous junction, decreased jnt mobs all jnts in right foot and ankle        Gait: -ambulates with er of right le, decreased heel strike, stance, and push off.     ROM LEFT RIGHT   HIP Flex Wfl all wfl   HIP Abd     HIP Ext     HIP IR     HIP ER     Knee ext  -10   Knee Flex     Ankle PF  20    Ankle DF  3   Ankle In  20   Ankle Ev  5   Strength  LEFT RIGHT   HIP Flexors 5 4   HIP Abductors 5 4   HIP Ext 5 4   Hip ER 5 4   Knee EXT (quad) 5 4+   Knee Flex (HS) 5 4+   Ankle DF 5 4   Ankle PF 5 4   Ankle Inv 5 4   Ankle EV 5 4-        Circumference  Mid apex  7 cm prox   Hams- -25     Hams -40   Tight piriformis,   Reflexes/Sensation:    [x]Dermatomes/Myotomes intact    [x]Reflexes equal and normal bilaterally   []Other:    Joint mobility:    []Normal    [x]Hypo   []Hyper    Orthopedic Special Tests: ant drawer, lat ligs intact                       [x] Patient history, allergies, meds reviewed. Medical chart reviewed. See intake form.     Review Of Systems (ROS):  [x]Performed Review of systems (Integumentary, CardioPulmonary, Neurological) by intake and observation. Intake form has been scanned into medical record. Patient has been instructed to contact their primary care physician regarding ROS issues if not already being addressed at this time.      Co-morbidities/Complexities (which will affect course of rehabilitation):   []None           Arthritic conditions   []Rheumatoid arthritis (M05.9)  []Osteoarthritis (M19.91)   Cardiovascular conditions   [x]Hypertension (I10)  []Hyperlipidemia (E78.5)  []Angina pectoris (I20)  []Atherosclerosis (I70)  []CVA Musculoskeletal conditions   []Disc pathology   []Congenital spine pathologies   []Prior surgical intervention  []Osteoporosis (M81.8)  []Osteopenia (M85.8)   Endocrine conditions   []Hypothyroid (E03.9)  []Hyperthyroid Gastrointestinal conditions   []Constipation (K59.00)   Metabolic conditions   []Morbid obesity (E66.01)  []Diabetes type 1(E10.65) or 2 (E11.65)   []Neuropathy (G60.9)     Pulmonary conditions   []Asthma (J45)  []Coughing   []COPD (J44.9)   Psychological Disorders  []Anxiety (F41.9)  []Depression (F32.9)   []Other:   [x]Other:     Additional Pertinent Hx:  History of adenocarcinoma of prostate   Blindness of left eye   AAA (abdominal  aortic aneurysm)- 3.9cm 12/2020, 4.0 cm 12/2022 repeat in 1 year   Diverticula, colon   Chronic non-seasonal allergic rhinitis   Glaucoma-sees dr Sabas Bender   BPH w/o urinary obs/LUTS--s/p prostatectomy   Hypercholesteremia   GERD (gastroesophageal reflux disease)--on daily ppi   Colonic polyp-(last colo 3/16--pos-repeat 3-5yrs)-dr mccarty   ED (erectile dysfunction)--started on daily cialis by dr Timothy Johnson   Incisional hernia, without obstruction or gangrene   Prediabetes   Essential hypertension     Barriers to/and or personal factors that will affect rehab potential:              []Age  []Sex    []Smoker              []Motivation/Lack of Motivation                        []Co-Morbidities              []Cognitive Function, education/learning barriers              []Environmental, home barriers              []profession/work barriers  []past PT/medical experience  []other:  Justification:     Falls Risk Assessment (30 days):   [x] Falls Risk assessed and no intervention required.   [] Falls Risk assessed and Patient requires intervention due to being higher risk   TUG score (>12s at risk):     [] Falls education provided, including         ASSESSMENT:   Functional Impairments:     []Noted lumbar/proximal hip/LE hypomobility   [x]Decreased LE functional ROM   []Decreased core/proximal hip strength and neuromuscular control   [x]Decreased LE functional strength   [x]Reduced balance/proprioceptive control   []other:      Functional Activity Limitations (from functional questionnaire and intake)   [x]Reduced ability to tolerate prolonged functional positions   [x]Reduced ability or difficulty with changes of positions or transfers between positions   [x]Reduced ability to maintain good posture and demonstrate good body mechanics with sitting, bending, and lifting   [x]Reduced ability to sleep   [x] Reduced ability or tolerance with driving and/or computer work   [x]Reduced ability to perform lifting, carrying tasks   [x]Reduced ability to squat   [x]Reduced ability to forward bend   [x]Reduced ability to ambulate prolonged functional periods/distances/surfaces   [x]Reduced ability to ascend/descend stairs   [x]Reduced ability to run, hop or jump   []other:     Participation Restrictions   [x]Reduced participation in self care activities   [x]Reduced participation in home management activities   [x]Reduced participation in work activities   [x]Reduced participation in social activities. [x]Reduced participation in sport activities. Classification :    []Signs/symptoms consistent with post-surgical status including decreased ROM, strength and function.    []Signs/symptoms consistent with joint sprain/strain   []Signs/symptoms consistent with patella-femoral syndrome   []Signs/symptoms consistent with knee OA/hip OA   []Signs/symptoms consistent with internal derangement of knee/Hip   []Signs/symptoms consistent with functional hip weakness/NMR control      [x]Signs/symptoms consistent with tendinitis/tendinosis    [x]signs/symptoms consistent with pathology which may benefit from Dry needling      [x]other:  possible achilles tear at some point     Prognosis/Rehab Potential:      []Excellent   [x]Good    []Fair   []Poor    Tolerance of evaluation/treatment:    []Excellent   [x]Good    []Fair   []Poor    Physical Therapy Evaluation Complexity Justification  [x] A history of present problem with:  [] no personal factors and/or comorbidities that impact the plan of care;  [x]1-2 personal factors and/or comorbidities that impact the plan of care  []3 personal factors and/or comorbidities that impact the plan of care  [x] An examination of body systems using standardized tests and measures addressing any of the following: body structures and functions (impairments), activity limitations, and/or participation restrictions;:  [] a total of 1-2 or more elements   [] a total of 3 or more elements   [x] a total of 4 or more elements   [x] A clinical presentation with:  [] stable and/or uncomplicated characteristics   [x] evolving clinical presentation with changing characteristics  [] unstable and unpredictable characteristics;   [x] Clinical decision making of [x] low, [] moderate, [] high complexity using standardized patient assessment instrument and/or measurable assessment of functional outcome. [x] EVAL (LOW) 89775 (typically 20 minutes face-to-face)  [] EVAL (MOD) 71738 (typically 30 minutes face-to-face)  [] EVAL (HIGH) 11246 (typically 45 minutes face-to-face)  [] RE-EVAL     PLAN:  Frequency/Duration:  2 days per week for 8 Weeks:  Interventions:  [x]  Therapeutic exercise including: strength training, ROM, for Lower extremity and core   [x]  NMR activation and proprioception for LE, Glutes and Core   [x]  Manual therapy as indicated for LE, Hip and spine to include: Dry Needling/IASTM, STM, PROM, Gr I-IV mobilizations, manipulation. [x] Modalities as needed that may include: thermal agents, E-stim, Biofeedback, US, iontophoresis as indicated  [x] Patient education on joint protection, postural re-education, activity modification, progression of HEP. HEP instruction: (see scanned forms)    GOALS:  Patient stated goal: \" I want to walk and function better \"  [] Progressing: [] Met: [] Not Met: [] Adjusted    Therapist goals for Patient:   Short Term Goals: To be achieved in: 2 weeks  1. Independent in HEP and progression per patient tolerance, in order to prevent re-injury. [] Progressing: [] Met: [] Not Met: [] Adjusted  2. Patient will have a decrease in pain to facilitate improvement in movement, function, and ADLs as indicated by Functional Deficits. [] Progressing: [] Met: [] Not Met: [] Adjusted    Long Term Goals: To be achieved in: 8 weeks  1. Increase FOTO functional outcome score from 33 to 45  to assist with reaching prior level of function. [] Progressing: [] Met: [] Not Met: [] Adjusted  2.  Patient will demonstrate increased AROM to wfl to allow for proper joint functioning as indicated by patients Functional Deficits. [] Progressing: [] Met: [] Not Met: [] Adjusted  3. Patient will demonstrate an increase in Strength to good proximal hip strength and control, within 5lb HHD in LE to allow for proper functional mobility as indicated by patients Functional Deficits. [] Progressing: [] Met: [] Not Met: [] Adjusted  4. Patient will return to walking, adl's functional activities without increased symptoms or restriction. [] Progressing: [] Met: [] Not Met: [] Adjusted  5. Walking 2 miles and return to golf. [] Progressing: [] Met: [] Not Met: [] Adjusted     Electronically signed by:  Elias Juarez PT      Note: If patient does not return for scheduled/recommended follow up visits, this note will serve as a discharge from care along with the most recent update on progress.

## 2023-03-08 ENCOUNTER — HOSPITAL ENCOUNTER (OUTPATIENT)
Dept: PHYSICAL THERAPY | Age: 78
Setting detail: THERAPIES SERIES
Discharge: HOME | End: 2023-03-08
Payer: MEDICARE

## 2023-03-08 PROCEDURE — 97110 THERAPEUTIC EXERCISES: CPT

## 2023-03-08 PROCEDURE — 97161 PT EVAL LOW COMPLEX 20 MIN: CPT

## 2023-03-08 PROCEDURE — 97140 MANUAL THERAPY 1/> REGIONS: CPT

## 2023-03-08 NOTE — FLOWSHEET NOTE
[facility]. [address]  Phone: [phone]   Fax:     [phone]      Date:  2023    Patient Name:  José Miguel Vargas    :  1945  MRN: [id number]    Pertinent Medical History: Additional Pertinent Hx:  History of adenocarcinoma of prostate   Blindness of left eye   AAA (abdominal aortic aneurysm)- 3.9cm 2020, 4.0 cm 2022 repeat in 1 year   Diverticula, colon   Chronic non-seasonal allergic rhinitis   Glaucoma-sees dr Dumont Antis   BPH w/o urinary obs/LUTS--s/p prostatectomy   Hypercholesteremia   GERD (gastroesophageal reflux disease)--on daily ppi   Colonic polyp-(last colo 3/16--pos-repeat 3-5yrs)-dr mccarty   ED (erectile dysfunction)--started on daily cialis by dr Marcos Perales   Incisional hernia, without obstruction or gangrene   Prediabetes   Essential hypertension    Medical/Treatment Diagnosis Information:  Medical Diagnosis: Achilles tendon pain [M76.60]  Treatment Diagnosis: decreased ability to ambulate and function    Insurance/Certification information:  PT Insurance Information: [facility] medicare  Physician Information:  NJ Holliday  Plan of care signed (Y/N): routed    Date of Patient follow up with Physician:      Progress Report: []  Yes  [x]  No     Date Range for reporting period:  Beginning: 3/8/2023  Ending:     Progress report due (10 Rx/or 30 days whichever is less):      Recertification due (POC duration/ or 90 days whichever is less):      Visit # Insurance/POC Allowable Auth Needed     []Yes    []No       Latex Allergy:  [x]NO      []YES  Preferred Language for Healthcare:   [x]English       []Other:    Functional Scale:      Date assessed: at eval  Test:LEFI-33  Score:    Pain level:  0-7/10     History of Injury:Patient is a 68 y/o male who started having right achilles tendon pain in December.   He had been walking 2 miles a day but can;t do that at the moment. He c/o constant pain in his right achilles that is worse with walking. He is using a brace at this time. He says the brace helps. He does not wake from pain. He has not had any testing so far. He gets worse as the day goes on. He hopes to return to golf  and walking. SUBJECTIVE:  Pt states, \" My achilles was really sore for the past few months,\"  \" I want to be able to walk 2 miles and return to golf \"  \"     OBJECTIVE:     ROM LEFT RIGHT   HIP Flex Wfl all wfl   HIP Abd       HIP Ext       HIP IR       HIP ER       Knee ext   -10   Knee Flex       Ankle PF   20   Ankle DF   3   Ankle In   20   Ankle Ev   5   Strength  LEFT RIGHT   HIP Flexors 5 4   HIP Abductors 5 4   HIP Ext 5 4   Hip ER 5 4   Knee EXT (quad) 5 4+   Knee Flex (HS) 5 4+   Ankle DF 5 4   Ankle PF 5 4   Ankle Inv 5 4   Ankle EV 5 4-           Circumference  Mid apex  7 cm prox    Hams- -25       Hams -40       RESTRICTIONS/PRECAUTIONS:     Exercises/Interventions:     Therapeutic Ex (79579)   Min: Reps/Resistance Notes/CUES   bike     incline     soleus     Bilat ever     Sl abd               Manual Intervention (26612)  Min: Mfr to gastroc, achilles, plantar fascia , prom all toe and ankle motions, jnt mobs gr 3 for df    Knee mobs/PROM     Tib/Fem Mobs     Patella Mobs     Ankle mobs               NMR re-education (52455)  Min:  CUES NEEDED   Side step     Standing abd     Monster walk     Heel raises     wobble     bosu                         Therapeutic Activity (53837)  Min: Discussed mechanism of injury, anatomy, physiology, biomechanics, sleeping positions,  and strategies to accelerate the healing process. Answered all of patients questions regarding injury. Gave necessary information to get any equipment needed.                 Modalities  Min:     IFC with      CP after exercises     MH after exercises            Other Therapeutic Activities: Pt was educated on PT POC, Diagnosis, Prognosis, pathomechanics as well as frequency and duration of scheduling future physical therapy appointments. Time was also taken on this day to answer all patient questions and participation in PT. Reviewed appointment policy in detail with patient and patient verbalized understanding. Home Exercise Program: Patient was instructed in the following for HEP:     . Patient verbalized/demonstrated understanding and was issued written handout. Access Code: [email or web address]  URL: [email or web address]  Date: 03/08/2023  Prepared by: Jihan Mix    Exercises  Seated Table Hamstring Stretch - 1 x daily - 7 x weekly - 3 sets - 10 reps  Long Sitting Calf Stretch with Strap - 1 x daily - 7 x weekly - 1 sets - 3 reps - 60 hold  Standing Ankle Dorsiflexor Toe Extensor Stretch - 1 x daily - 7 x weekly - 3 sets - 10 reps  Ankle and Toe Plantarflexion with Resistance - 1 x daily - 7 x weekly - 3 sets - 10 reps  Access Code: [email or web address]  URL: [email or web address]. [email or web address]  Date: 03/08/2023  Prepared by: Jihan Mix    Exercises  Seated Ankle Eversion with Resistance - 1 x daily - 7 x weekly - 3 sets - 10 reps      Therapeutic Exercise and NMR EXR  [] (86958) Provided verbal/tactile cueing for activities related to strengthening, flexibility, endurance, ROM for improvements in LE, proximal hip, and core control with self care, mobility, lifting, ambulation.  [] (02783) Provided verbal/tactile cueing for activities related to improving balance, coordination, kinesthetic sense, posture, motor skill, proprioception  to assist with LE, proximal hip, and core control in self care, mobility, lifting, ambulation and eccentric single leg control.      NMR and Therapeutic Activities:    [] (91989 or 93407) Provided verbal/tactile cueing for activities related to improving balance, coordination, kinesthetic sense, posture, motor skill, proprioception and motor activation to allow for proper function of core, proximal hip and LE with self care and ADLs and functional mobility.   [] (42523) Gait Re-education- Provided training and instruction to the patient for proper LE, core and proximal hip recruitment and positioning and eccentric body weight control with ambulation re-education including up and down stairs     Home Exercise Program:    [x] (07770) Reviewed/Progressed HEP activities related to strengthening, flexibility, endurance, ROM of core, proximal hip and LE for functional self-care, mobility, lifting and ambulation/stair navigation   [] (11035)Reviewed/Progressed HEP activities related to improving balance, coordination, kinesthetic sense, posture, motor skill, proprioception of core, proximal hip and LE for self care, mobility, lifting, and ambulation/stair navigation      Manual Treatments:  PROM / STM / Oscillations-Mobs:  G-I, II, III, IV (PA's, Inf., Post.)  [] (09736) Provided manual therapy to mobilize LE, proximal hip and/or LS spine soft tissue/joints for the purpose of modulating pain, promoting relaxation,  increasing ROM, reducing/eliminating soft tissue swelling/inflammation/restriction, improving soft tissue extensibility and allowing for proper ROM for normal function with self care, mobility, lifting and ambulation.        Charges:  Timed Code Treatment Minutes: 30   Total Treatment Minutes: 50      [x] EVAL (LOW) 41831 (typically 20 minutes face-to-face)  [] EVAL (MOD) 36348 (typically 30 minutes face-to-face)  [] EVAL (HIGH) 61369 (typically 45 minutes face-to-face)  [] RE-EVAL     [x] OE(61082) x 1    [] Dry needle 1 or 2 Muscles (02053)  [] NMR (88611) x     [] Dry needle 3+ Muscles (36450)  [x] Manual (82681) x  1   [] Ultrasound (55118) x  [] TA (17631) x     [] Mech Traction (81535)  [] ES(attended) (11769)     [] ES (un) (94734):   [] Vasopump (21415) [] Ionto (44760)   [] Other:    GOALS:  Patient stated goal: \" I want to walk and function better \"  [] Progressing: [] Met: [] Not Met: [] Adjusted     Therapist goals for Patient:   Short Term Goals: To be achieved in: 2 weeks  1. Independent in HEP and progression per patient tolerance, in order to prevent re-injury. [] Progressing: [] Met: [] Not Met: [] Adjusted  2. Patient will have a decrease in pain to facilitate improvement in movement, function, and ADLs as indicated by Functional Deficits. [] Progressing: [] Met: [] Not Met: [] Adjusted     Long Term Goals: To be achieved in: 8 weeks  1. Increase FOTO functional outcome score from 33 to 45  to assist with reaching prior level of function. [] Progressing: [] Met: [] Not Met: [] Adjusted  2. Patient will demonstrate increased AROM to wfl to allow for proper joint functioning as indicated by patients Functional Deficits. [] Progressing: [] Met: [] Not Met: [] Adjusted  3. Patient will demonstrate an increase in Strength to good proximal hip strength and control, within 5lb HHD in LE to allow for proper functional mobility as indicated by patients Functional Deficits. [] Progressing: [] Met: [] Not Met: [] Adjusted  4. Patient will return to walking, adl's functional activities without increased symptoms or restriction. [] Progressing: [] Met: [] Not Met: [] Adjusted  5. Walking 2 miles and return to golf. [] Progressing: [] Met: [] Not Met: [] Adjusted         ASSESSMENT:  See eval    Treatment/Activity Tolerance:  [x] Patient tolerated treatment well [] Patient limited by fatique  [] Patient limited by pain  [] Patient limited by other medical complications  [] Other:     Overall Progression Towards Functional goals/ Treatment Progress Update:  [] Patient is progressing as expected towards functional goals listed. [] Progression is slowed due to complexities/Impairments listed. [] Progression has been slowed due to co-morbidities.   [x] Plan just implemented, too soon to assess goals progression <30days   [] Goals require adjustment due to lack of progress  [] Patient is not progressing as expected and requires additional follow up with physician  [] Other    Prognosis for POC: [x] Good [] Fair  [] Poor    Patient requires continued skilled intervention: [x] Yes  [] No        PLAN. LE arom, prom  strength, proprioception, gait, balance, functional activities. Mfr, joint mobs, mods as needed, hep. Progress as tolerated, ankle jnt mob, ankle strength le stretches, hip strength, mods as needed, may dn        [] Continue per plan of care [] Alter current plan (see comments)  [x] Plan of care initiated [] Hold pending MD visit [] Discharge    Electronically signed by: Elias Juarez PT    Note: If patient does not return for scheduled/recommended follow up visits, this note will serve as a discharge from care along with the most recent update on progress.

## 2023-03-10 ENCOUNTER — HOSPITAL ENCOUNTER (OUTPATIENT)
Dept: PHYSICAL THERAPY | Age: 78
Setting detail: THERAPIES SERIES
Discharge: HOME | End: 2023-03-10
Payer: MEDICARE

## 2023-03-10 PROCEDURE — 97110 THERAPEUTIC EXERCISES: CPT

## 2023-03-10 PROCEDURE — 97112 NEUROMUSCULAR REEDUCATION: CPT

## 2023-03-10 PROCEDURE — 97140 MANUAL THERAPY 1/> REGIONS: CPT

## 2023-03-10 NOTE — FLOWSHEET NOTE
[facility]  [address] [place] [address]  Phone: [phone]   Fax:     [phone]      Date:  03/10/2023    Patient Name:  Aureliano Campbell    :  1945  MRN: [id number]    Pertinent Medical History: Additional Pertinent Hx:  History of adenocarcinoma of prostate   Blindness of left eye   AAA (abdominal aortic aneurysm)- 3.9cm 2020, 4.0 cm 2022 repeat in 1 year   Diverticula, colon   Chronic non-seasonal allergic rhinitis   Glaucoma-sees dr Melendez   BPH w/o urinary obs/LUTS--s/p prostatectomy   Hypercholesteremia   GERD (gastroesophageal reflux disease)--on daily ppi   Colonic polyp-(last colo 3/16--pos-repeat 3-5yrs)-dr mccarty   ED (erectile dysfunction)--started on daily cialis by dr Pritchard-urology   Incisional hernia, without obstruction or gangrene   Prediabetes   Essential hypertension    Medical/Treatment Diagnosis Information:  Medical Diagnosis: Achilles tendon pain [M76.60]  Treatment Diagnosis: decreased ability to ambulate and function    Insurance/Certification information:  PT Insurance Information: [facility] medicare  Physician Information:  Alayna Vick AP*  Plan of care signed (Y/N): routed    Date of Patient follow up with Physician:      Progress Report: []  Yes  [x]  No     Date Range for reporting period:  Beginning: 3/10/2023  Ending:     Progress report due (10 Rx/or 30 days whichever is less): 23     Recertification due (POC duration/ or 90 days whichever is less):      Visit # Insurance/POC Allowable Auth Needed     []Yes    []No       Latex Allergy:  [x]NO      []YES  Preferred Language for Healthcare:   [x]English       []Other:    Functional Scale:      Date assessed: at eval  Test:LEFI-33  Score:    Pain level:  0-7/10     History of Injury:Patient is a 76 y/o male who started having right achilles tendon pain in December.  He had been walking 2 miles a  day but can;t do that at the moment. He c/o constant pain in his right achilles that is worse with walking. He is using a brace at this time. He says the brace helps. He does not wake from pain. He has not had any testing so far. He gets worse as the day goes on. He hopes to return to golf  and walking. SUBJECTIVE:  Pt states, \" My achilles was really sore for the past few months,\"  \" I want to be able to walk 2 miles and return to golf \"  3/10/23  Pt states, \" Felt a little better after last rx \"  \"     OBJECTIVE:     ROM LEFT RIGHT   HIP Flex Wfl all wfl   HIP Abd       HIP Ext       HIP IR       HIP ER       Knee ext   -10   Knee Flex       Ankle PF   20   Ankle DF   3   Ankle In   20   Ankle Ev   5   Strength  LEFT RIGHT   HIP Flexors 5 4   HIP Abductors 5 4   HIP Ext 5 4   Hip ER 5 4   Knee EXT (quad) 5 4+   Knee Flex (HS) 5 4+   Ankle DF 5 4   Ankle PF 5 4   Ankle Inv 5 4   Ankle EV 5 4-           Circumference  Mid apex  7 cm prox    Hams- -25       Hams -40       RESTRICTIONS/PRECAUTIONS:     Exercises/Interventions:     Therapeutic Ex (71415)   Min:30 with nmr Reps/Resistance Notes/CUES   HEP Initiated 3/8/23,  Reviewed and added on 3/10    bike X 6 min 30   incline 60 x 3 gentle    soleus 30 x 3    Bilat ever Red x 30    Sl abd               Manual Intervention (51127)  Min:30 Mfr to gastroc, achilles, plantar fascia , prom all toe and ankle motions, jnt mobs gr 3 for df, iastm for     Knee mobs/PROM     Tib/Fem Mobs     Patella Mobs     Ankle mobs               NMR re-education (42540)  Min:  CUES NEEDED   Side step     Standing abd Red x 30    Standing ext Red x 30    Monster walk     Heel raises X 2 min minimal raise    wobble 3 way x 2 min ea    bosu     sls     Single leg squat     Step ups 6\" x 30         Therapeutic Activity (92857)  Min: Discussed mechanism of injury, anatomy, physiology, biomechanics, sleeping positions,  and strategies to accelerate the healing process.  Answered all of patients questions regarding injury. Gave necessary information to get any equipment needed. Modalities  Min:     IFC with      CP after exercises     MH after exercises            Other Therapeutic Activities: Pt was educated on PT POC, Diagnosis, Prognosis, pathomechanics as well as frequency and duration of scheduling future physical therapy appointments. Time was also taken on this day to answer all patient questions and participation in PT. Reviewed appointment policy in detail with patient and patient verbalized understanding. Home Exercise Program: Patient was instructed in the following for HEP:     . Patient verbalized/demonstrated understanding and was issued written handout. Access Code: [email or web address]  URL: [email or web address]  Date: 03/08/2023  Prepared by: Caroline Noah    Exercises  Seated Table Hamstring Stretch - 1 x daily - 7 x weekly - 3 sets - 10 reps  Long Sitting Calf Stretch with Strap - 1 x daily - 7 x weekly - 1 sets - 3 reps - 60 hold  Standing Ankle Dorsiflexor Toe Extensor Stretch - 1 x daily - 7 x weekly - 3 sets - 10 reps  Ankle and Toe Plantarflexion with Resistance - 1 x daily - 7 x weekly - 3 sets - 10 reps  Access Code: [email or web address]  URL: [email or web address]  Date: 03/08/2023  Prepared by: Caroline Noah    Exercises  Seated Ankle Eversion with Resistance - 1 x daily - 7 x weekly - 3 sets - 10 reps  Access Code: [email or web address]  URL: [email or web address]  Date: 03/10/2023  Prepared by: Caroline Noah    Exercises  Standing Heel Raise with Support - 1 x daily - 7 x weekly - 3 sets - 10 reps      Therapeutic Exercise and NMR EXR  [] (34389) Provided verbal/tactile cueing for activities related to strengthening, flexibility, endurance, ROM for improvements in LE, proximal hip, and core control with self care, mobility, lifting, ambulation.  [] (06194) Provided verbal/tactile cueing for activities related to improving balance, coordination, kinesthetic sense, posture, motor skill, proprioception  to assist with LE, proximal hip, and core control in self care, mobility, lifting, ambulation and eccentric single leg control. NMR and Therapeutic Activities:    [] (69208 or 84584) Provided verbal/tactile cueing for activities related to improving balance, coordination, kinesthetic sense, posture, motor skill, proprioception and motor activation to allow for proper function of core, proximal hip and LE with self care and ADLs and functional mobility.   [] (18832) Gait Re-education- Provided training and instruction to the patient for proper LE, core and proximal hip recruitment and positioning and eccentric body weight control with ambulation re-education including up and down stairs     Home Exercise Program:    [x] (81629) Reviewed/Progressed HEP activities related to strengthening, flexibility, endurance, ROM of core, proximal hip and LE for functional self-care, mobility, lifting and ambulation/stair navigation   [] (33446)Reviewed/Progressed HEP activities related to improving balance, coordination, kinesthetic sense, posture, motor skill, proprioception of core, proximal hip and LE for self care, mobility, lifting, and ambulation/stair navigation      Manual Treatments:  PROM / STM / Oscillations-Mobs:  G-I, II, III, IV (PA's, Inf., Post.)  [] (52999) Provided manual therapy to mobilize LE, proximal hip and/or LS spine soft tissue/joints for the purpose of modulating pain, promoting relaxation,  increasing ROM, reducing/eliminating soft tissue swelling/inflammation/restriction, improving soft tissue extensibility and allowing for proper ROM for normal function with self care, mobility, lifting and ambulation.        Charges:  Timed Code Treatment Minutes: 60   Total Treatment Minutes: 65      [] EVAL (LOW) 18635 (typically 20 minutes face-to-face)  [] EVAL (MOD) 43953 (typically 30 minutes face-to-face)  [] EVAL (HIGH) 68224 (typically 45 minutes face-to-face)  [] RE-EVAL     [x] XQ(76526) x 1    [] Dry needle 1 or 2 Muscles (34772)  [x] NMR (49068) x  1   [] Dry needle 3+ Muscles (66062)  [x] Manual (81716) x  2  [] Ultrasound (36488) x  [] TA (38465) x     [] Mech Traction (21830)  [] ES(attended) (34043)     [] ES (un) (22999):   [] Vasopump (26862) [] Ionto (55315)   [] Other:    GOALS:  Patient stated goal: \" I want to walk and function better \"  [] Progressing: [] Met: [] Not Met: [] Adjusted     Therapist goals for Patient:   Short Term Goals: To be achieved in: 2 weeks  1. Independent in HEP and progression per patient tolerance, in order to prevent re-injury. [] Progressing: [] Met: [] Not Met: [] Adjusted  2. Patient will have a decrease in pain to facilitate improvement in movement, function, and ADLs as indicated by Functional Deficits. [] Progressing: [] Met: [] Not Met: [] Adjusted     Long Term Goals: To be achieved in: 8 weeks  1. Increase FOTO functional outcome score from 33 to 45  to assist with reaching prior level of function. [] Progressing: [] Met: [] Not Met: [] Adjusted  2. Patient will demonstrate increased AROM to wfl to allow for proper joint functioning as indicated by patients Functional Deficits. [] Progressing: [] Met: [] Not Met: [] Adjusted  3. Patient will demonstrate an increase in Strength to good proximal hip strength and control, within 5lb HHD in LE to allow for proper functional mobility as indicated by patients Functional Deficits. [] Progressing: [] Met: [] Not Met: [] Adjusted  4. Patient will return to walking, adl's functional activities without increased symptoms or restriction. [] Progressing: [] Met: [] Not Met: [] Adjusted  5. Walking 2 miles and return to golf.    [] Progressing: [] Met: [] Not Met: [] Adjusted         ASSESSMENT:  See eval    Treatment/Activity Tolerance:  [x] Patient tolerated treatment well [] Patient limited by fatique  [] Patient limited by pain  [] Patient limited by other medical complications  [] Other:     Overall Progression Towards Functional goals/ Treatment Progress Update:  [] Patient is progressing as expected towards functional goals listed. [] Progression is slowed due to complexities/Impairments listed. [] Progression has been slowed due to co-morbidities. [x] Plan just implemented, too soon to assess goals progression <30days   [] Goals require adjustment due to lack of progress  [] Patient is not progressing as expected and requires additional follow up with physician  [] Other    Prognosis for POC: [x] Good [] Fair  [] Poor    Patient requires continued skilled intervention: [x] Yes  [] No        PLAN. LE arom, prom  strength, proprioception, gait, balance, functional activities. Mfr, joint mobs, mods as needed, hep. Progress as tolerated, ankle jnt mob, ankle strength le stretches, hip strength, mods as needed, may dn        [] Continue per plan of care [] Alter current plan (see comments)  [x] Plan of care initiated [] Hold pending MD visit [] Discharge    Electronically signed by: Roly Liriano PT    Note: If patient does not return for scheduled/recommended follow up visits, this note will serve as a discharge from care along with the most recent update on progress.

## 2023-03-15 ENCOUNTER — HOSPITAL ENCOUNTER (OUTPATIENT)
Dept: PHYSICAL THERAPY | Age: 78
Setting detail: THERAPIES SERIES
Discharge: HOME | End: 2023-03-15
Payer: MEDICARE

## 2023-03-15 PROCEDURE — 97110 THERAPEUTIC EXERCISES: CPT

## 2023-03-15 PROCEDURE — 97140 MANUAL THERAPY 1/> REGIONS: CPT

## 2023-03-15 PROCEDURE — 97112 NEUROMUSCULAR REEDUCATION: CPT

## 2023-03-15 NOTE — FLOWSHEET NOTE
[facility] [address]  Phone: [phone]   Fax:     [phone]      Date:  03/15/2023    Patient Name:  Jersey Schmitt    :  1945  MRN: [id number]    Pertinent Medical History: Additional Pertinent Hx:  History of adenocarcinoma of prostate   Blindness of left eye   AAA (abdominal aortic aneurysm)- 3.9cm 2020, 4.0 cm 2022 repeat in 1 year   Diverticula, colon   Chronic non-seasonal allergic rhinitis   Glaucoma-sees dr Yang Cristina   BPH w/o urinary obs/LUTS--s/p prostatectomy   Hypercholesteremia   GERD (gastroesophageal reflux disease)--on daily ppi   Colonic polyp-(last colo 3/16--pos-repeat 3-5yrs)-dr mccarty   ED (erectile dysfunction)--started on daily cialis by dr Luis Carlos Desir   Incisional hernia, without obstruction or gangrene   Prediabetes   Essential hypertension    Medical/Treatment Diagnosis Information:  Medical Diagnosis: Achilles tendon pain [M76.60]  Treatment Diagnosis: decreased ability to ambulate and function    Insurance/Certification information:  PT Insurance Information: [facility] medicare  Physician Information:  NJ Kraft  Plan of care signed (Y/N): routed    Date of Patient follow up with Physician:      Progress Report: []  Yes  [x]  No     Date Range for reporting period:  Beginning: 3/15/2023  Ending:     Progress report due (10 Rx/or 30 days whichever is less): 5/3/59     Recertification due (POC duration/ or 90 days whichever is less):      Visit # Insurance/POC Allowable Auth Needed   3 /12  []Yes    []No       Latex Allergy:  [x]NO      []YES  Preferred Language for Healthcare:   [x]English       []Other:    Functional Scale:      Date assessed: at eval  Test:LEFI-33  Score:    Pain level: 4/10     History of Injury:Patient is a 70 y/o male who started having right achilles tendon pain in December.   He had been walking 2 miles a day but can;t do that at the moment. He c/o constant pain in his right achilles that is worse with walking. He is using a brace at this time. He says the brace helps. He does not wake from pain. He has not had any testing so far. He gets worse as the day goes on. He hopes to return to golf  and walking.          SUBJECTIVE:  Pt states, \" My achilles was really sore for the past few months,\"  \" I want to be able to walk 2 miles and return to golf \"  3/10/23  Pt states, \" Felt a little better after last rx \"  3/15/23  Pt states, \" Walked the furthest yet today and it didn't hurt \"  \"     OBJECTIVE:     ROM LEFT RIGHT   HIP Flex Wfl all wfl   HIP Abd       HIP Ext       HIP IR       HIP ER       Knee ext   -10   Knee Flex       Ankle PF   20   Ankle DF   3   Ankle In   20   Ankle Ev   5   Strength  LEFT RIGHT   HIP Flexors 5 4   HIP Abductors 5 4   HIP Ext 5 4   Hip ER 5 4   Knee EXT (quad) 5 4+   Knee Flex (HS) 5 4+   Ankle DF 5 4   Ankle PF 5 4   Ankle Inv 5 4   Ankle EV 5 4-           Circumference  Mid apex  7 cm prox    Hams- -25       Hams -40       RESTRICTIONS/PRECAUTIONS:     Exercises/Interventions:     Therapeutic Ex (24804)   Min:30 with nmr Reps/Resistance Notes/CUES   HEP Initiated 3/8/23,  Reviewed and added on 3/10    bike X 6 min 30   incline 60 x 3 gentle    soleus 30 x 3    Bilat ever Red x 30    Sl abd     pf X 30 blue         Manual Intervention (72655)  Min:30 Mfr to gastroc, achilles, plantar fascia , prom all toe and ankle motions, jnt mobs gr 3 for df, iastm for     Knee mobs/PROM     Tib/Fem Mobs     Patella Mobs     Ankle mobs               NMR re-education (73187)  Min:  CUES NEEDED   Side step     Standing abd Red x 30    Standing ext Red x 30    Monster walk     Heel raises X 2 min minimal raise    wobble 3 way x 2 min ea    bosu     sls     Single leg squat     Step ups 6\" x 30, down x 30, side x 30         Therapeutic Activity (29883)  Min: Discussed mechanism of injury, anatomy, physiology, biomechanics, sleeping positions,  and strategies to accelerate the healing process. Answered all of patients questions regarding injury. Gave necessary information to get any equipment needed. Modalities  Min:     IFC with      CP after exercises     MH after exercises            Other Therapeutic Activities: Pt was educated on PT POC, Diagnosis, Prognosis, pathomechanics as well as frequency and duration of scheduling future physical therapy appointments. Time was also taken on this day to answer all patient questions and participation in PT. Reviewed appointment policy in detail with patient and patient verbalized understanding. Home Exercise Program: Patient was instructed in the following for HEP:     . Patient verbalized/demonstrated understanding and was issued written handout. Access Code: [email or web address]  URL: [employer]/  Date: 03/08/2023  Prepared by: Simran Loop    Exercises  Seated Table Hamstring Stretch - 1 x daily - 7 x weekly - 3 sets - 10 reps  Long Sitting Calf Stretch with Strap - 1 x daily - 7 x weekly - 1 sets - 3 reps - 60 hold  Standing Ankle Dorsiflexor Toe Extensor Stretch - 1 x daily - 7 x weekly - 3 sets - 10 reps  Ankle and Toe Plantarflexion with Resistance - 1 x daily - 7 x weekly - 3 sets - 10 reps  Access Code: [email or web address]  URL: [employer]/  Date: 03/08/2023  Prepared by: Simran Loop    Exercises  Seated Ankle Eversion with Resistance - 1 x daily - 7 x weekly - 3 sets - 10 reps  Access Code: [email or web address]  URL: [email or web address]  Date: 03/10/2023  Prepared by: Simarn Loop    Exercises  Standing Heel Raise with Support - 1 x daily - 7 x weekly - 3 sets - 10 reps      Therapeutic Exercise and NMR EXR  [] (33643) Provided verbal/tactile cueing for activities related to strengthening, flexibility, endurance, ROM for improvements in LE, proximal hip, and core control with self care, mobility, lifting, ambulation.  [] (59780) Provided verbal/tactile cueing for activities related to improving balance, coordination, kinesthetic sense, posture, motor skill, proprioception  to assist with LE, proximal hip, and core control in self care, mobility, lifting, ambulation and eccentric single leg control. NMR and Therapeutic Activities:    [] (62654 or 75485) Provided verbal/tactile cueing for activities related to improving balance, coordination, kinesthetic sense, posture, motor skill, proprioception and motor activation to allow for proper function of core, proximal hip and LE with self care and ADLs and functional mobility.   [] (32644) Gait Re-education- Provided training and instruction to the patient for proper LE, core and proximal hip recruitment and positioning and eccentric body weight control with ambulation re-education including up and down stairs     Home Exercise Program:    [x] (01526) Reviewed/Progressed HEP activities related to strengthening, flexibility, endurance, ROM of core, proximal hip and LE for functional self-care, mobility, lifting and ambulation/stair navigation   [] (65268)Reviewed/Progressed HEP activities related to improving balance, coordination, kinesthetic sense, posture, motor skill, proprioception of core, proximal hip and LE for self care, mobility, lifting, and ambulation/stair navigation      Manual Treatments:  PROM / STM / Oscillations-Mobs:  G-I, II, III, IV (PA's, Inf., Post.)  [] (41508) Provided manual therapy to mobilize LE, proximal hip and/or LS spine soft tissue/joints for the purpose of modulating pain, promoting relaxation,  increasing ROM, reducing/eliminating soft tissue swelling/inflammation/restriction, improving soft tissue extensibility and allowing for proper ROM for normal function with self care, mobility, lifting and ambulation.        Charges:  Timed Code Treatment Minutes: 60   Total Treatment Minutes: 65      [] EVAL (LOW) 02188 (typically 20 minutes face-to-face)  [] EVAL (MOD) 59693 (typically 30 minutes face-to-face)  [] EVAL (HIGH) 95168 (typically 45 minutes face-to-face)  [] RE-EVAL     [x] CX(18393) x 1    [] Dry needle 1 or 2 Muscles (01174)  [x] NMR (76550) x  1   [] Dry needle 3+ Muscles (10952)  [x] Manual (54646) x  2  [] Ultrasound (79454) x  [] TA (89311) x     [] Mech Traction (55222)  [] ES(attended) (43671)     [] ES (un) (22 125517):   [] Vasopump (83699) [] Ionto (05859)   [] Other:    GOALS:  Patient stated goal: \" I want to walk and function better \"  [] Progressing: [] Met: [] Not Met: [] Adjusted     Therapist goals for Patient:   Short Term Goals: To be achieved in: 2 weeks  1. Independent in HEP and progression per patient tolerance, in order to prevent re-injury. [] Progressing: [] Met: [] Not Met: [] Adjusted  2. Patient will have a decrease in pain to facilitate improvement in movement, function, and ADLs as indicated by Functional Deficits. [] Progressing: [] Met: [] Not Met: [] Adjusted     Long Term Goals: To be achieved in: 8 weeks  1. Increase FOTO functional outcome score from 33 to 45  to assist with reaching prior level of function. [] Progressing: [] Met: [] Not Met: [] Adjusted  2. Patient will demonstrate increased AROM to wfl to allow for proper joint functioning as indicated by patients Functional Deficits. [] Progressing: [] Met: [] Not Met: [] Adjusted  3. Patient will demonstrate an increase in Strength to good proximal hip strength and control, within 5lb HHD in LE to allow for proper functional mobility as indicated by patients Functional Deficits. [] Progressing: [] Met: [] Not Met: [] Adjusted  4. Patient will return to walking, adl's functional activities without increased symptoms or restriction. [] Progressing: [] Met: [] Not Met: [] Adjusted  5. Walking 2 miles and return to golf.    [] Progressing: [] Met: [] Not Met: [] Adjusted         ASSESSMENT:  See eval    Treatment/Activity Tolerance:  [x] Patient tolerated treatment well [] Patient limited by fatique  [] Patient limited by pain  [] Patient limited by other medical complications  [] Other:     Overall Progression Towards Functional goals/ Treatment Progress Update:  [] Patient is progressing as expected towards functional goals listed. [] Progression is slowed due to complexities/Impairments listed. [] Progression has been slowed due to co-morbidities. [x] Plan just implemented, too soon to assess goals progression <30days   [] Goals require adjustment due to lack of progress  [] Patient is not progressing as expected and requires additional follow up with physician  [] Other    Prognosis for POC: [x] Good [] Fair  [] Poor    Patient requires continued skilled intervention: [x] Yes  [] No        PLAN. LE arom, prom  strength, proprioception, gait, balance, functional activities. Mfr, joint mobs, mods as needed, hep. Progress as tolerated, ankle jnt mob, ankle strength le stretches, hip strength, mods as needed, may dn        [] Continue per plan of care [] Alter current plan (see comments)  [x] Plan of care initiated [] Hold pending MD visit [] Discharge    Electronically signed by: Judith Naqvi PT    Note: If patient does not return for scheduled/recommended follow up visits, this note will serve as a discharge from care along with the most recent update on progress.

## 2023-03-17 ENCOUNTER — HOSPITAL ENCOUNTER (OUTPATIENT)
Dept: PHYSICAL THERAPY | Age: 78
Setting detail: THERAPIES SERIES
Discharge: HOME | End: 2023-03-17
Payer: MEDICARE

## 2023-03-17 PROCEDURE — 97110 THERAPEUTIC EXERCISES: CPT

## 2023-03-17 PROCEDURE — 97112 NEUROMUSCULAR REEDUCATION: CPT

## 2023-03-17 PROCEDURE — 97140 MANUAL THERAPY 1/> REGIONS: CPT

## 2023-03-17 NOTE — FLOWSHEET NOTE
[facility] [place]  Phone: [phone]   Fax:     [phone]      Date:  2023    Patient Name:  Penny Gonzales    :  1945  MRN: [id number]    Pertinent Medical History: Additional Pertinent Hx:  History of adenocarcinoma of prostate   Blindness of left eye   AAA (abdominal aortic aneurysm)- 3.9cm 2020, 4.0 cm 2022 repeat in 1 year   Diverticula, colon   Chronic non-seasonal allergic rhinitis   Glaucoma-sees dr Lisset Monson   BPH w/o urinary obs/LUTS--s/p prostatectomy   Hypercholesteremia   GERD (gastroesophageal reflux disease)--on daily ppi   Colonic polyp-(last colo 3/16--pos-repeat 3-5yrs)-dr mccarty   ED (erectile dysfunction)--started on daily cialis by dr Wade Ag   Incisional hernia, without obstruction or gangrene   Prediabetes   Essential hypertension    Medical/Treatment Diagnosis Information:  Medical Diagnosis: Achilles tendon pain [M76.60]  Treatment Diagnosis: decreased ability to ambulate and function    Insurance/Certification information:  PT Insurance Information: [facility] medicare  Physician Information:  NJ Gorman  Plan of care signed (Y/N): routed    Date of Patient follow up with Physician:      Progress Report: []  Yes  [x]  No     Date Range for reporting period:  Beginning: 3/17/2023  Ending:     Progress report due (10 Rx/or 30 days whichever is less): 48     Recertification due (POC duration/ or 90 days whichever is less):      Visit # Insurance/POC Allowable Auth Needed     []Yes    []No       Latex Allergy:  [x]NO      []YES  Preferred Language for Healthcare:   [x]English       []Other:    Functional Scale:      Date assessed: at eval  Test:LEFI-33  Score:    Pain level: 4/10     History of Injury:Patient is a 68 y/o male who started having right achilles tendon pain in December.   He had been walking 2 miles a day but can;t do that at the moment. He c/o constant pain in his right achilles that is worse with walking. He is using a brace at this time. He says the brace helps. He does not wake from pain. He has not had any testing so far. He gets worse as the day goes on. He hopes to return to golf  and walking.          SUBJECTIVE:  Pt states, \" My achilles was really sore for the past few months,\"  \" I want to be able to walk 2 miles and return to golf \"  3/10/23  Pt states, \" Felt a little better after last rx \"  3/15/23  Pt states, \" Walked the furthest yet today and it didn't hurt \"  3/17/23 Pt states, \" Darrick Kenning further today without any problems \"  \"     OBJECTIVE:     ROM LEFT RIGHT   HIP Flex Wfl all wfl   HIP Abd       HIP Ext       HIP IR       HIP ER       Knee ext   -10   Knee Flex       Ankle PF   20   Ankle DF   3   Ankle In   20   Ankle Ev   5   Strength  LEFT RIGHT   HIP Flexors 5 4   HIP Abductors 5 4   HIP Ext 5 4   Hip ER 5 4   Knee EXT (quad) 5 4+   Knee Flex (HS) 5 4+   Ankle DF 5 4   Ankle PF 5 4   Ankle Inv 5 4   Ankle EV 5 4-           Circumference  Mid apex  7 cm prox    Hams- -25       Hams -40       RESTRICTIONS/PRECAUTIONS:     Exercises/Interventions:     Therapeutic Ex (05245)   Min:30 with nmr Reps/Resistance Notes/CUES   HEP Initiated 3/8/23,  Reviewed and added on 3/10    bike X 6 min 30   Leg press 60# right x 30    incline 60 x 3 gentle    soleus 30 x 3    Bilat ever Green  x 30    Sl abd     pf X 30 blue         Manual Intervention (28562)  Min:30 Mfr to gastroc, achilles, plantar fascia , prom all toe and ankle motions, jnt mobs gr 3 for df, iastm for     Knee mobs/PROM     Tib/Fem Mobs     Patella Mobs     Ankle mobs               NMR re-education (80547)  Min:  CUES NEEDED   Side step     Standing abd Red x 30    Standing ext Red x 30    Monster walk     Heel raises X 2 min minimal raise    wobble 3 way x 2 min ea    bosu Walk holding on x 2 min    sls     Single leg squat Step ups 6\" x 30, down x 30, side x 30    Mini lunge X 30 ea                   Therapeutic Activity (27440)  Min: Discussed mechanism of injury, anatomy, physiology, biomechanics, sleeping positions,  and strategies to accelerate the healing process. Answered all of patients questions regarding injury. Gave necessary information to get any equipment needed. Modalities  Min:     IFC with      CP after exercises     MH after exercises            Other Therapeutic Activities: Pt was educated on PT POC, Diagnosis, Prognosis, pathomechanics as well as frequency and duration of scheduling future physical therapy appointments. Time was also taken on this day to answer all patient questions and participation in PT. Reviewed appointment policy in detail with patient and patient verbalized understanding. Home Exercise Program: Patient was instructed in the following for HEP:     . Patient verbalized/demonstrated understanding and was issued written handout. Access Code: [email or web address]  URL: [email or web address]  Date: 03/08/2023  Prepared by: Stanley Going    Exercises  Seated Table Hamstring Stretch - 1 x daily - 7 x weekly - 3 sets - 10 reps  Long Sitting Calf Stretch with Strap - 1 x daily - 7 x weekly - 1 sets - 3 reps - 60 hold  Standing Ankle Dorsiflexor Toe Extensor Stretch - 1 x daily - 7 x weekly - 3 sets - 10 reps  Ankle and Toe Plantarflexion with Resistance - 1 x daily - 7 x weekly - 3 sets - 10 reps  Access Code: [email or web address]  URL: [employer]/  Date: 03/08/2023  Prepared by: Stanley Going    Exercises  Seated Ankle Eversion with Resistance - 1 x daily - 7 x weekly - 3 sets - 10 reps  Access Code: [email or web address]  URL: [email or web address]  Date: 03/10/2023  Prepared by: Stanley Going    Exercises  Standing Heel Raise with Support - 1 x daily - 7 x weekly - 3 sets - 10 reps      Therapeutic Exercise and NMR EXR  [] (84749) Provided verbal/tactile cueing for activities related to strengthening, flexibility, endurance, ROM for improvements in LE, proximal hip, and core control with self care, mobility, lifting, ambulation.  [] (54395) Provided verbal/tactile cueing for activities related to improving balance, coordination, kinesthetic sense, posture, motor skill, proprioception  to assist with LE, proximal hip, and core control in self care, mobility, lifting, ambulation and eccentric single leg control.      NMR and Therapeutic Activities:    [] (02357 or 50171) Provided verbal/tactile cueing for activities related to improving balance, coordination, kinesthetic sense, posture, motor skill, proprioception and motor activation to allow for proper function of core, proximal hip and LE with self care and ADLs and functional mobility.   [] (80392) Gait Re-education- Provided training and instruction to the patient for proper LE, core and proximal hip recruitment and positioning and eccentric body weight control with ambulation re-education including up and down stairs     Home Exercise Program:    [x] (01648) Reviewed/Progressed HEP activities related to strengthening, flexibility, endurance, ROM of core, proximal hip and LE for functional self-care, mobility, lifting and ambulation/stair navigation   [] (59871)Reviewed/Progressed HEP activities related to improving balance, coordination, kinesthetic sense, posture, motor skill, proprioception of core, proximal hip and LE for self care, mobility, lifting, and ambulation/stair navigation      Manual Treatments:  PROM / STM / Oscillations-Mobs:  G-I, II, III, IV (PA's, Inf., Post.)  [] (85048) Provided manual therapy to mobilize LE, proximal hip and/or LS spine soft tissue/joints for the purpose of modulating pain, promoting relaxation,  increasing ROM, reducing/eliminating soft tissue swelling/inflammation/restriction, improving soft tissue extensibility and allowing for proper ROM for normal function with self care, mobility, lifting and ambulation. Charges:  Timed Code Treatment Minutes: 60   Total Treatment Minutes: 65      [] EVAL (LOW) 96045 (typically 20 minutes face-to-face)  [] EVAL (MOD) 01744 (typically 30 minutes face-to-face)  [] EVAL (HIGH) 85146 (typically 45 minutes face-to-face)  [] RE-EVAL     [x] IB(64798) x 1    [] Dry needle 1 or 2 Muscles (35114)  [x] NMR (49240) x  1   [] Dry needle 3+ Muscles (53549)  [x] Manual (68891) x  2  [] Ultrasound (90489) x  [] TA (53384) x     [] Mech Traction (23044)  [] ES(attended) (23083)     [] ES (un) (22 278180):   [] Vasopump (63879) [] Ionto (55740)   [] Other:    GOALS:  Patient stated goal: \" I want to walk and function better \"  [] Progressing: [] Met: [] Not Met: [] Adjusted     Therapist goals for Patient:   Short Term Goals: To be achieved in: 2 weeks  1. Independent in HEP and progression per patient tolerance, in order to prevent re-injury. [] Progressing: [] Met: [] Not Met: [] Adjusted  2. Patient will have a decrease in pain to facilitate improvement in movement, function, and ADLs as indicated by Functional Deficits. [] Progressing: [] Met: [] Not Met: [] Adjusted     Long Term Goals: To be achieved in: 8 weeks  1. Increase FOTO functional outcome score from 33 to 45  to assist with reaching prior level of function. [] Progressing: [] Met: [] Not Met: [] Adjusted  2. Patient will demonstrate increased AROM to wfl to allow for proper joint functioning as indicated by patients Functional Deficits. [] Progressing: [] Met: [] Not Met: [] Adjusted  3. Patient will demonstrate an increase in Strength to good proximal hip strength and control, within 5lb HHD in LE to allow for proper functional mobility as indicated by patients Functional Deficits. [] Progressing: [] Met: [] Not Met: [] Adjusted  4. Patient will return to walking, adl's functional activities without increased symptoms or restriction. [] Progressing: [] Met: [] Not Met: [] Adjusted  5.  Walking 2 miles and return to golf. [] Progressing: [] Met: [] Not Met: [] Adjusted         ASSESSMENT:  See eval    Treatment/Activity Tolerance:  [x] Patient tolerated treatment well [] Patient limited by fatique  [] Patient limited by pain  [] Patient limited by other medical complications  [] Other:     Overall Progression Towards Functional goals/ Treatment Progress Update:  [] Patient is progressing as expected towards functional goals listed. [] Progression is slowed due to complexities/Impairments listed. [] Progression has been slowed due to co-morbidities. [x] Plan just implemented, too soon to assess goals progression <30days   [] Goals require adjustment due to lack of progress  [] Patient is not progressing as expected and requires additional follow up with physician  [] Other    Prognosis for POC: [x] Good [] Fair  [] Poor    Patient requires continued skilled intervention: [x] Yes  [] No        PLAN. LE arom, prom  strength, proprioception, gait, balance, functional activities. Mfr, joint mobs, mods as needed, hep. Progress as tolerated, ankle jnt mob, ankle strength le stretches, hip strength, mods as needed, may dn  3/17/23  Progressing, continue to increase strength as juanita. [] Continue per plan of care [] Alter current plan (see comments)  [x] Plan of care initiated [] Hold pending MD visit [] Discharge    Electronically signed by: Deny Leon PT    Note: If patient does not return for scheduled/recommended follow up visits, this note will serve as a discharge from care along with the most recent update on progress.

## 2023-03-22 ENCOUNTER — HOSPITAL ENCOUNTER (OUTPATIENT)
Dept: PHYSICAL THERAPY | Age: 78
Setting detail: THERAPIES SERIES
Discharge: HOME | End: 2023-03-22
Payer: MEDICARE

## 2023-03-22 PROCEDURE — 97110 THERAPEUTIC EXERCISES: CPT

## 2023-03-22 PROCEDURE — 97140 MANUAL THERAPY 1/> REGIONS: CPT

## 2023-03-22 PROCEDURE — 97112 NEUROMUSCULAR REEDUCATION: CPT

## 2023-03-22 NOTE — FLOWSHEET NOTE
reps      Therapeutic Exercise and NMR EXR  [] (15115) Provided verbal/tactile cueing for activities related to strengthening, flexibility, endurance, ROM for improvements in LE, proximal hip, and core control with self care, mobility, lifting, ambulation.  [] (21492) Provided verbal/tactile cueing for activities related to improving balance, coordination, kinesthetic sense, posture, motor skill, proprioception  to assist with LE, proximal hip, and core control in self care, mobility, lifting, ambulation and eccentric single leg control.      NMR and Therapeutic Activities:    [] (25215 or 01219) Provided verbal/tactile cueing for activities related to improving balance, coordination, kinesthetic sense, posture, motor skill, proprioception and motor activation to allow for proper function of core, proximal hip and LE with self care and ADLs and functional mobility.   [] (85643) Gait Re-education- Provided training and instruction to the patient for proper LE, core and proximal hip recruitment and positioning and eccentric body weight control with ambulation re-education including up and down stairs     Home Exercise Program:    [x] (03093) Reviewed/Progressed HEP activities related to strengthening, flexibility, endurance, ROM of core, proximal hip and LE for functional self-care, mobility, lifting and ambulation/stair navigation   [] (24971)Reviewed/Progressed HEP activities related to improving balance, coordination, kinesthetic sense, posture, motor skill, proprioception of core, proximal hip and LE for self care, mobility, lifting, and ambulation/stair navigation      Manual Treatments:  PROM / STM / Oscillations-Mobs:  G-I, II, III, IV (PA's, Inf., Post.)  [] (82104) Provided manual therapy to mobilize LE, proximal hip and/or LS spine soft tissue/joints for the purpose of modulating pain, promoting relaxation,  increasing ROM, reducing/eliminating soft tissue swelling/inflammation/restriction, improving soft

## 2023-03-24 ENCOUNTER — HOSPITAL ENCOUNTER (OUTPATIENT)
Dept: PHYSICAL THERAPY | Age: 78
Setting detail: THERAPIES SERIES
Discharge: HOME | End: 2023-03-24
Payer: MEDICARE

## 2023-03-24 PROCEDURE — 97140 MANUAL THERAPY 1/> REGIONS: CPT

## 2023-03-24 PROCEDURE — 97110 THERAPEUTIC EXERCISES: CPT

## 2023-03-24 NOTE — FLOWSHEET NOTE
day but can;t do that at the moment. He c/o constant pain in his right achilles that is worse with walking. He is using a brace at this time. He says the brace helps. He does not wake from pain. He has not had any testing so far. He gets worse as the day goes on. He hopes to return to golf  and walking.          SUBJECTIVE:  Pt states, \" My achilles was really sore for the past few months,\"  \" I want to be able to walk 2 miles and return to golf \"  3/10/23  Pt states, \" Felt a little better after last rx \"  3/15/23  Pt states, \" Walked the furthest yet today and it didn't hurt \"  3/17/23 Pt states, \" Parker Hunger further today without any problems \"  3/22/23  Pt states, \" Did some more exercising over the past few days and it wasn't too bad \"  3/24/23  Pt states, \" Worked out yesterday, doing well, just achy at times \"  \"     OBJECTIVE:     ROM LEFT RIGHT   HIP Flex Wfl all wfl   HIP Abd       HIP Ext       HIP IR       HIP ER       Knee ext   -10   Knee Flex       Ankle PF   20   Ankle DF   3   Ankle In   20   Ankle Ev   5   Strength  LEFT RIGHT   HIP Flexors 5 4   HIP Abductors 5 4   HIP Ext 5 4   Hip ER 5 4   Knee EXT (quad) 5 4+   Knee Flex (HS) 5 4+   Ankle DF 5 4   Ankle PF 5 4   Ankle Inv 5 4   Ankle EV 5 4-           Circumference  Mid apex  7 cm prox    Hams- -25       Hams -40       RESTRICTIONS/PRECAUTIONS:     Exercises/Interventions:     Therapeutic Ex (90318)   Min:15 Reps/Resistance Notes/CUES   HEP Initiated 3/8/23,  Reviewed and added on 3/10    bike X 6 min 30   Leg press 70# right x 30    incline 60 x 3 gentle    soleus 30 x 3    Bilat ever Green  x 30    Sl abd     pf X 30 blue         Manual Intervention (74957)  Min:30 Mfr to gastroc, achilles, plantar fascia , prom all toe and ankle motions, jnt mobs gr 3 for df, iastm for     Knee mobs/PROM     Tib/Fem Mobs     Patella Mobs     Ankle mobs               NMR re-education (08972)  Min:  CUES NEEDED   Side step     Standing abd    Standing ext

## 2023-04-21 ENCOUNTER — HOSPITAL ENCOUNTER (OUTPATIENT)
Dept: PHYSICAL THERAPY | Age: 78
Setting detail: THERAPIES SERIES
Discharge: HOME | End: 2023-04-21
Payer: MEDICARE

## 2023-04-21 DIAGNOSIS — K21.9 GASTROESOPHAGEAL REFLUX DISEASE, UNSPECIFIED WHETHER ESOPHAGITIS PRESENT: ICD-10-CM

## 2023-04-21 DIAGNOSIS — I10 ESSENTIAL HYPERTENSION: ICD-10-CM

## 2023-04-21 PROCEDURE — 97530 THERAPEUTIC ACTIVITIES: CPT

## 2023-04-21 PROCEDURE — 97110 THERAPEUTIC EXERCISES: CPT

## 2023-04-21 RX ORDER — LOSARTAN POTASSIUM 100 MG/1
TABLET ORAL
Qty: 90 TABLET | Refills: 1 | Status: SHIPPED | OUTPATIENT
Start: 2023-04-21

## 2023-04-21 RX ORDER — AMLODIPINE BESYLATE 5 MG/1
TABLET ORAL
Qty: 90 TABLET | Refills: 1 | Status: SHIPPED | OUTPATIENT
Start: 2023-04-21

## 2023-04-21 RX ORDER — ESOMEPRAZOLE MAGNESIUM 40 MG/1
CAPSULE, DELAYED RELEASE ORAL
Qty: 90 CAPSULE | Refills: 1 | Status: SHIPPED | OUTPATIENT
Start: 2023-04-21

## 2023-04-21 NOTE — FLOWSHEET NOTE
prostatectomy   Hypercholesteremia   GERD (gastroesophageal reflux disease)--on daily ppi   Colonic polyp-(last colo 3/16--pos-repeat 3-5yrs)-dr mccarty   ED (erectile dysfunction)--started on daily cialis by dr Mian Lizama   Incisional hernia, without obstruction or gangrene   Prediabetes   Essential hypertension    Medical/Treatment Diagnosis Information:  Medical Diagnosis: Achilles tendon pain [M76.60]  Treatment Diagnosis: decreased ability to ambulate and function    Insurance/Certification information:  PT Insurance Information: [facility] medicare  Physician Information:  NJ Iglesias  Plan of care signed (Y/N): routed    Date of Patient follow up with Physician:      Progress Report: []  Yes  [x]  No     Date Range for reporting period:  Beginnin2023  Ending:     Progress report due (10 Rx/or 30 days whichever is less): 14     Recertification due (POC duration/ or 90 days whichever is less):      Visit # Insurance/POC Allowable Auth Needed     []Yes    []No       Latex Allergy:  [x]NO      []YES  Preferred Language for Healthcare:   [x]English       []Other:    Functional Scale:      Date assessed: at eval  Test:LEFI-33  Score:    Pain level: 1-3/10     History of Injury:Patient is a 68 y/o male who started having right achilles tendon pain in December. He had been walking 2 miles a day but can;t do that at the moment. He c/o constant pain in his right achilles that is worse with walking. He is using a brace at this time. He says the brace helps. He does not wake from pain. He has not had any testing so far. He gets worse as the day goes on. He hopes to return to golf  and walking.          SUBJECTIVE:  Pt states, \" My achilles was really sore for the past few months,\"  \" I want to be able to walk 2 miles and return to golf \"  3/10/23  Pt states, \" Felt a little better after last rx \"  3/15/23  Pt states, \" Walked the furthest yet today and it didn't hurt \"  3/17/23 Pt states, \"

## 2023-06-07 ENCOUNTER — HOSPITAL ENCOUNTER (OUTPATIENT)
Dept: PHYSICAL THERAPY | Age: 78
Setting detail: THERAPIES SERIES
End: 2023-06-07
Payer: MEDICARE

## 2023-06-26 SDOH — HEALTH STABILITY: PHYSICAL HEALTH: ON AVERAGE, HOW MANY MINUTES DO YOU ENGAGE IN EXERCISE AT THIS LEVEL?: 40 MIN

## 2023-06-26 SDOH — ECONOMIC STABILITY: INCOME INSECURITY: HOW HARD IS IT FOR YOU TO PAY FOR THE VERY BASICS LIKE FOOD, HOUSING, MEDICAL CARE, AND HEATING?: NOT HARD AT ALL

## 2023-06-26 SDOH — ECONOMIC STABILITY: TRANSPORTATION INSECURITY
IN THE PAST 12 MONTHS, HAS LACK OF TRANSPORTATION KEPT YOU FROM MEETINGS, WORK, OR FROM GETTING THINGS NEEDED FOR DAILY LIVING?: NO

## 2023-06-26 SDOH — ECONOMIC STABILITY: FOOD INSECURITY: WITHIN THE PAST 12 MONTHS, THE FOOD YOU BOUGHT JUST DIDN'T LAST AND YOU DIDN'T HAVE MONEY TO GET MORE.: NEVER TRUE

## 2023-06-26 SDOH — HEALTH STABILITY: PHYSICAL HEALTH: ON AVERAGE, HOW MANY DAYS PER WEEK DO YOU ENGAGE IN MODERATE TO STRENUOUS EXERCISE (LIKE A BRISK WALK)?: 6 DAYS

## 2023-06-26 SDOH — ECONOMIC STABILITY: HOUSING INSECURITY
IN THE LAST 12 MONTHS, WAS THERE A TIME WHEN YOU DID NOT HAVE A STEADY PLACE TO SLEEP OR SLEPT IN A SHELTER (INCLUDING NOW)?: NO

## 2023-06-26 SDOH — ECONOMIC STABILITY: FOOD INSECURITY: WITHIN THE PAST 12 MONTHS, YOU WORRIED THAT YOUR FOOD WOULD RUN OUT BEFORE YOU GOT MONEY TO BUY MORE.: NEVER TRUE

## 2023-06-26 ASSESSMENT — LIFESTYLE VARIABLES
HOW OFTEN DURING THE LAST YEAR HAVE YOU FAILED TO DO WHAT WAS NORMALLY EXPECTED FROM YOU BECAUSE OF DRINKING: NEVER
HAS A RELATIVE, FRIEND, DOCTOR, OR ANOTHER HEALTH PROFESSIONAL EXPRESSED CONCERN ABOUT YOUR DRINKING OR SUGGESTED YOU CUT DOWN: 0
HOW OFTEN DURING THE LAST YEAR HAVE YOU HAD A FEELING OF GUILT OR REMORSE AFTER DRINKING: 0
HOW OFTEN DURING THE LAST YEAR HAVE YOU NEEDED AN ALCOHOLIC DRINK FIRST THING IN THE MORNING TO GET YOURSELF GOING AFTER A NIGHT OF HEAVY DRINKING: 0
HOW OFTEN DURING THE LAST YEAR HAVE YOU BEEN UNABLE TO REMEMBER WHAT HAPPENED THE NIGHT BEFORE BECAUSE YOU HAD BEEN DRINKING: NEVER
HOW MANY STANDARD DRINKS CONTAINING ALCOHOL DO YOU HAVE ON A TYPICAL DAY: 1 OR 2
HOW OFTEN DURING THE LAST YEAR HAVE YOU HAD A FEELING OF GUILT OR REMORSE AFTER DRINKING: NEVER
HOW OFTEN DURING THE LAST YEAR HAVE YOU FOUND THAT YOU WERE NOT ABLE TO STOP DRINKING ONCE YOU HAD STARTED: 0
HAVE YOU OR SOMEONE ELSE BEEN INJURED AS A RESULT OF YOUR DRINKING: NO
HAVE YOU OR SOMEONE ELSE BEEN INJURED AS A RESULT OF YOUR DRINKING: 0
HOW OFTEN DURING THE LAST YEAR HAVE YOU FOUND THAT YOU WERE NOT ABLE TO STOP DRINKING ONCE YOU HAD STARTED: NEVER
HOW OFTEN DO YOU HAVE A DRINK CONTAINING ALCOHOL: 5
HOW OFTEN DURING THE LAST YEAR HAVE YOU FAILED TO DO WHAT WAS NORMALLY EXPECTED FROM YOU BECAUSE OF DRINKING: 0
HOW OFTEN DO YOU HAVE SIX OR MORE DRINKS ON ONE OCCASION: 1
HOW OFTEN DO YOU HAVE A DRINK CONTAINING ALCOHOL: 4 OR MORE TIMES A WEEK
HOW OFTEN DURING THE LAST YEAR HAVE YOU BEEN UNABLE TO REMEMBER WHAT HAPPENED THE NIGHT BEFORE BECAUSE YOU HAD BEEN DRINKING: 0
HOW OFTEN DURING THE LAST YEAR HAVE YOU NEEDED AN ALCOHOLIC DRINK FIRST THING IN THE MORNING TO GET YOURSELF GOING AFTER A NIGHT OF HEAVY DRINKING: NEVER
HAS A RELATIVE, FRIEND, DOCTOR, OR ANOTHER HEALTH PROFESSIONAL EXPRESSED CONCERN ABOUT YOUR DRINKING OR SUGGESTED YOU CUT DOWN: NO
HOW MANY STANDARD DRINKS CONTAINING ALCOHOL DO YOU HAVE ON A TYPICAL DAY: 1

## 2023-06-26 ASSESSMENT — PATIENT HEALTH QUESTIONNAIRE - PHQ9
SUM OF ALL RESPONSES TO PHQ QUESTIONS 1-9: 0
SUM OF ALL RESPONSES TO PHQ QUESTIONS 1-9: 0
1. LITTLE INTEREST OR PLEASURE IN DOING THINGS: 0
SUM OF ALL RESPONSES TO PHQ QUESTIONS 1-9: 0
2. FEELING DOWN, DEPRESSED OR HOPELESS: 0
SUM OF ALL RESPONSES TO PHQ QUESTIONS 1-9: 0
SUM OF ALL RESPONSES TO PHQ9 QUESTIONS 1 & 2: 0

## 2023-06-29 ENCOUNTER — OFFICE VISIT (OUTPATIENT)
Dept: FAMILY MEDICINE CLINIC | Age: 78
End: 2023-06-29
Payer: MEDICARE

## 2023-06-29 VITALS
SYSTOLIC BLOOD PRESSURE: 132 MMHG | BODY MASS INDEX: 29.21 KG/M2 | TEMPERATURE: 98.3 F | RESPIRATION RATE: 16 BRPM | HEIGHT: 73 IN | WEIGHT: 220.4 LBS | DIASTOLIC BLOOD PRESSURE: 80 MMHG | HEART RATE: 66 BPM | OXYGEN SATURATION: 96 %

## 2023-06-29 DIAGNOSIS — R73.03 PREDIABETES: ICD-10-CM

## 2023-06-29 DIAGNOSIS — I10 ESSENTIAL HYPERTENSION: ICD-10-CM

## 2023-06-29 DIAGNOSIS — M25.552 LEFT HIP PAIN: ICD-10-CM

## 2023-06-29 DIAGNOSIS — I71.40 ABDOMINAL AORTIC ANEURYSM (AAA) WITHOUT RUPTURE, UNSPECIFIED PART (HCC): ICD-10-CM

## 2023-06-29 DIAGNOSIS — Z00.00 MEDICARE ANNUAL WELLNESS VISIT, SUBSEQUENT: Primary | ICD-10-CM

## 2023-06-29 DIAGNOSIS — Z13.29 THYROID DISORDER SCREENING: ICD-10-CM

## 2023-06-29 DIAGNOSIS — K21.9 GASTROESOPHAGEAL REFLUX DISEASE, UNSPECIFIED WHETHER ESOPHAGITIS PRESENT: ICD-10-CM

## 2023-06-29 DIAGNOSIS — E78.00 HYPERCHOLESTEREMIA: ICD-10-CM

## 2023-06-29 PROCEDURE — G0439 PPPS, SUBSEQ VISIT: HCPCS | Performed by: NURSE PRACTITIONER

## 2023-06-29 PROCEDURE — 99213 OFFICE O/P EST LOW 20 MIN: CPT | Performed by: NURSE PRACTITIONER

## 2023-06-29 PROCEDURE — 3078F DIAST BP <80 MM HG: CPT | Performed by: NURSE PRACTITIONER

## 2023-06-29 PROCEDURE — 1123F ACP DISCUSS/DSCN MKR DOCD: CPT | Performed by: NURSE PRACTITIONER

## 2023-06-29 PROCEDURE — 3074F SYST BP LT 130 MM HG: CPT | Performed by: NURSE PRACTITIONER

## 2023-06-30 DIAGNOSIS — Z13.29 THYROID DISORDER SCREENING: ICD-10-CM

## 2023-06-30 DIAGNOSIS — I10 ESSENTIAL HYPERTENSION: ICD-10-CM

## 2023-06-30 DIAGNOSIS — E78.00 HYPERCHOLESTEREMIA: ICD-10-CM

## 2023-06-30 DIAGNOSIS — Z00.00 MEDICARE ANNUAL WELLNESS VISIT, SUBSEQUENT: ICD-10-CM

## 2023-06-30 DIAGNOSIS — K21.9 GASTROESOPHAGEAL REFLUX DISEASE, UNSPECIFIED WHETHER ESOPHAGITIS PRESENT: ICD-10-CM

## 2023-06-30 DIAGNOSIS — R73.03 PREDIABETES: ICD-10-CM

## 2023-06-30 LAB
ALBUMIN SERPL-MCNC: 4.8 G/DL (ref 3.4–5)
ALBUMIN/GLOB SERPL: 2.1 {RATIO} (ref 1.1–2.2)
ALP SERPL-CCNC: 80 U/L (ref 40–129)
ALT SERPL-CCNC: 25 U/L (ref 10–40)
ANION GAP SERPL CALCULATED.3IONS-SCNC: 9 MMOL/L (ref 3–16)
AST SERPL-CCNC: 27 U/L (ref 15–37)
BASOPHILS # BLD: 0 K/UL (ref 0–0.2)
BASOPHILS NFR BLD: 0.8 %
BILIRUB SERPL-MCNC: 0.4 MG/DL (ref 0–1)
BUN SERPL-MCNC: 15 MG/DL (ref 7–20)
CALCIUM SERPL-MCNC: 10.1 MG/DL (ref 8.3–10.6)
CHLORIDE SERPL-SCNC: 105 MMOL/L (ref 99–110)
CHOLEST SERPL-MCNC: 214 MG/DL (ref 0–199)
CO2 SERPL-SCNC: 28 MMOL/L (ref 21–32)
CREAT SERPL-MCNC: 0.7 MG/DL (ref 0.8–1.3)
DEPRECATED RDW RBC AUTO: 13.7 % (ref 12.4–15.4)
EOSINOPHIL # BLD: 0.2 K/UL (ref 0–0.6)
EOSINOPHIL NFR BLD: 3.8 %
GFR SERPLBLD CREATININE-BSD FMLA CKD-EPI: >60 ML/MIN/{1.73_M2}
GLUCOSE SERPL-MCNC: 88 MG/DL (ref 70–99)
HCT VFR BLD AUTO: 46.2 % (ref 40.5–52.5)
HDLC SERPL-MCNC: 64 MG/DL (ref 40–60)
HGB BLD-MCNC: 15.8 G/DL (ref 13.5–17.5)
LDLC SERPL CALC-MCNC: 126 MG/DL
LYMPHOCYTES # BLD: 1.4 K/UL (ref 1–5.1)
LYMPHOCYTES NFR BLD: 35.7 %
MCH RBC QN AUTO: 35.6 PG (ref 26–34)
MCHC RBC AUTO-ENTMCNC: 34.2 G/DL (ref 31–36)
MCV RBC AUTO: 104.3 FL (ref 80–100)
MONOCYTES # BLD: 0.4 K/UL (ref 0–1.3)
MONOCYTES NFR BLD: 11.1 %
NEUTROPHILS # BLD: 2 K/UL (ref 1.7–7.7)
NEUTROPHILS NFR BLD: 48.6 %
PLATELET # BLD AUTO: 200 K/UL (ref 135–450)
PMV BLD AUTO: 9.3 FL (ref 5–10.5)
POTASSIUM SERPL-SCNC: 4.9 MMOL/L (ref 3.5–5.1)
PROT SERPL-MCNC: 7.1 G/DL (ref 6.4–8.2)
RBC # BLD AUTO: 4.44 M/UL (ref 4.2–5.9)
SODIUM SERPL-SCNC: 142 MMOL/L (ref 136–145)
TRIGL SERPL-MCNC: 122 MG/DL (ref 0–150)
TSH SERPL DL<=0.005 MIU/L-ACNC: 1.3 UIU/ML (ref 0.27–4.2)
VLDLC SERPL CALC-MCNC: 24 MG/DL
WBC # BLD AUTO: 4 K/UL (ref 4–11)

## 2023-07-03 DIAGNOSIS — D75.89 MACROCYTOSIS: Primary | ICD-10-CM

## 2023-07-05 DIAGNOSIS — D75.89 MACROCYTOSIS: ICD-10-CM

## 2023-07-05 LAB
FOLATE SERPL-MCNC: 18.25 NG/ML (ref 4.78–24.2)
VIT B12 SERPL-MCNC: 434 PG/ML (ref 211–911)

## 2023-07-12 ENCOUNTER — HOSPITAL ENCOUNTER (OUTPATIENT)
Dept: PHYSICAL THERAPY | Age: 78
Setting detail: THERAPIES SERIES
Discharge: HOME | End: 2023-07-12
Payer: MEDICARE

## 2023-07-12 PROCEDURE — 97110 THERAPEUTIC EXERCISES: CPT

## 2023-07-12 PROCEDURE — 97161 PT EVAL LOW COMPLEX 20 MIN: CPT

## 2023-07-12 PROCEDURE — 97140 MANUAL THERAPY 1/> REGIONS: CPT

## 2023-07-12 PROCEDURE — 97112 NEUROMUSCULAR REEDUCATION: CPT

## 2023-07-12 NOTE — FLOWSHEET NOTE
[address] [address]  Phone: [phone]   Fax:     [phone]      Date:  2023    Patient Name:  Macarena Steen    :  1945  MRN: [id number]    Pertinent Medical History: Additional Pertinent Hx:  History of adenocarcinoma of prostate   Blindness of left eye   AAA (abdominal aortic aneurysm)- 3.9cm 2020, 4.0 cm 2022 repeat in 1 year   Diverticula, colon   Chronic non-seasonal allergic rhinitis   Glaucoma-sees dr Celeste Steel   BPH w/o urinary obs/LUTS--s/p prostatectomy   Hypercholesteremia   GERD (gastroesophageal reflux disease)--on daily ppi   Colonic polyp-(last colo 3/16--pos-repeat 3-5yrs)-dr mccarty   ED (erectile dysfunction)--started on daily cialis by dr Omkar Mckeon   Incisional hernia, without obstruction or gangrene   Prediabetes   Essential hypertension    Medical/Treatment Diagnosis Information:  Medical Diagnosis: Left hip pain [M25.552]  Treatment Diagnosis: decreased ability to ambulate and function    Insurance/Certification information:  PT Insurance Information: [facility] medicare  Physician Information:  NJ Gonzalez  Plan of care signed (Y/N): routed    Date of Patient follow up with Physician:      Progress Report: []  Yes  [x]  No     Date Range for reporting period:  Beginnin2023  Ending:     Progress report due (10 Rx/or 30 days whichever is less): 71     Recertification due (POC duration/ or 90 days whichever is less):      Visit # Insurance/POC Allowable Auth Needed    mn []Yes    []No       Latex Allergy:  [x]NO      []YES  Preferred Language for Healthcare:   [x]English       []Other:    Functional Scale:        Test: UOQLU-96  Date Assessed Score               Pain level:  0-5/10     History of Injury: Patient is a 70 y/o male with a hx of left hip pain for the past few months without injury.   He c/o constant

## 2023-07-18 ENCOUNTER — HOSPITAL ENCOUNTER (OUTPATIENT)
Dept: PHYSICAL THERAPY | Age: 78
Setting detail: THERAPIES SERIES
Discharge: HOME | End: 2023-07-18
Payer: MEDICARE

## 2023-07-18 PROCEDURE — 97140 MANUAL THERAPY 1/> REGIONS: CPT

## 2023-07-18 PROCEDURE — 97110 THERAPEUTIC EXERCISES: CPT

## 2023-07-18 NOTE — FLOWSHEET NOTE
[address] [address]  Phone: [phone]   Fax:     [phone]      Date:  2023    Patient Name:  Rama Dennis    :  1945  MRN: [id number]    Pertinent Medical History: Additional Pertinent Hx:  History of adenocarcinoma of prostate   Blindness of left eye   AAA (abdominal aortic aneurysm)- 3.9cm 2020, 4.0 cm 2022 repeat in 1 year   Diverticula, colon   Chronic non-seasonal allergic rhinitis   Glaucoma-sees dr Juanita Garcia   BPH w/o urinary obs/LUTS--s/p prostatectomy   Hypercholesteremia   GERD (gastroesophageal reflux disease)--on daily ppi   Colonic polyp-(last colo 3/16--pos-repeat 3-5yrs)-dr mccarty   ED (erectile dysfunction)--started on daily cialis by dr Daniella Spence   Incisional hernia, without obstruction or gangrene   Prediabetes   Essential hypertension    Medical/Treatment Diagnosis Information:  Medical Diagnosis: Left hip pain [M25.552]  Treatment Diagnosis: decreased ability to ambulate and function    Insurance/Certification information:  PT Insurance Information: [facility] medicare  Physician Information:  NJ Mcmahon  Plan of care signed (Y/N): routed    Date of Patient follow up with Physician:      Progress Report: []  Yes  [x]  No     Date Range for reporting period:  Beginnin2023  Ending:     Progress report due (10 Rx/or 30 days whichever is less): 3/04/27     Recertification due (POC duration/ or 90 days whichever is less):      Visit # Insurance/POC Allowable Auth Needed    mn []Yes    []No       Latex Allergy:  [x]NO      []YES  Preferred Language for Healthcare:   [x]English       []Other:    Functional Scale:        Test: NAHNZ-70  Date Assessed Score               Pain level:  4/10     History of Injury: Patient is a 68 y/o male with a hx of left hip pain for the past few months without injury.   He c/o constant pain

## 2023-07-20 RX ORDER — ATORVASTATIN CALCIUM 80 MG/1
TABLET, FILM COATED ORAL
Qty: 90 TABLET | Refills: 1 | Status: SHIPPED | OUTPATIENT
Start: 2023-07-20

## 2023-08-01 ENCOUNTER — HOSPITAL ENCOUNTER (OUTPATIENT)
Dept: PHYSICAL THERAPY | Age: 78
Setting detail: THERAPIES SERIES
Discharge: HOME | End: 2023-08-01
Payer: MEDICARE

## 2023-08-01 PROCEDURE — 97110 THERAPEUTIC EXERCISES: CPT

## 2023-08-01 PROCEDURE — 97140 MANUAL THERAPY 1/> REGIONS: CPT

## 2023-08-01 NOTE — FLOWSHEET NOTE
[address] [address]  Phone: [phone]   Fax:     [phone]      Date:  2023    Patient Name:  Arielle Cain    :  1945  MRN: [id number]    Pertinent Medical History: Additional Pertinent Hx:  History of adenocarcinoma of prostate   Blindness of left eye   AAA (abdominal aortic aneurysm)- 3.9cm 2020, 4.0 cm 2022 repeat in 1 year   Diverticula, colon   Chronic non-seasonal allergic rhinitis   Glaucoma-sees dr Howie Alberto   BPH w/o urinary obs/LUTS--s/p prostatectomy   Hypercholesteremia   GERD (gastroesophageal reflux disease)--on daily ppi   Colonic polyp-(last colo 3/16--pos-repeat 3-5yrs)-dr mccarty   ED (erectile dysfunction)--started on daily cialis by dr Tequila Cervantes   Incisional hernia, without obstruction or gangrene   Prediabetes   Essential hypertension    Medical/Treatment Diagnosis Information:  Medical Diagnosis: Left hip pain [M25.552]  Treatment Diagnosis: decreased ability to ambulate and function    Insurance/Certification information:  PT Insurance Information: [facility] medicare  Physician Information:  NJ Londono  Plan of care signed (Y/N): routed    Date of Patient follow up with Physician:      Progress Report: []  Yes  [x]  No     Date Range for reporting period:  Beginnin2023  Ending:     Progress report due (10 Rx/or 30 days whichever is less): 02     Recertification due (POC duration/ or 90 days whichever is less):      Visit # Insurance/POC Allowable Auth Needed   3 /12 mn []Yes    []No       Latex Allergy:  [x]NO      []YES  Preferred Language for Healthcare:   [x]English       []Other:    Functional Scale:        Test: RKABI-19  Date Assessed Score               Pain level:  0-2/10     History of Injury: Patient is a 68 y/o male with a hx of left hip pain for the past few months without injury.   He c/o constant pain

## 2023-08-04 ENCOUNTER — HOSPITAL ENCOUNTER (OUTPATIENT)
Dept: PHYSICAL THERAPY | Age: 78
Setting detail: THERAPIES SERIES
Discharge: HOME | End: 2023-08-04
Payer: MEDICARE

## 2023-08-04 PROCEDURE — 97110 THERAPEUTIC EXERCISES: CPT

## 2023-08-04 PROCEDURE — 97140 MANUAL THERAPY 1/> REGIONS: CPT

## 2023-08-04 NOTE — FLOWSHEET NOTE
ambulation. Modalities:  [] (34117) Vasopneumatic compression: Utilized vasopneumatic compression to decrease edema / swelling for the purpose of improving mobility and quad tone / recruitment which will allow for increased overall function including but not limited to self-care, transfers, ambulation, and ascending / descending stairs. If BWC Please Indicate Time In/Out  CPT Code Time in Time out                         Approval Dates:  CPT Code Units Approved Units Used  Date Updated:                     Charges:  Timed Code Treatment Minutes: 45   Total Treatment Minutes: 50      [] EVAL (LOW) 51745 (typically 20 minutes face-to-face)  [] EVAL (MOD) 74308 (typically 30 minutes face-to-face)  [] EVAL (HIGH) 24457 (typically 45 minutes face-to-face)  [] RE-EVAL     [x] KM(63458) x   1  [] Dry needle 1 or 2 Muscles (38284)  [] NMR (73506) x     [] Dry needle 3+ Muscles (22868)  [x] Manual (55618) x 2    [] Ultrasound (95179) x  [] TA (22112) x  [] Mech Traction (08858)  [] ES(attended) (91226)     [] ES (un) (89684):   [] Vasopump (58353) [] Ionto (00101)   [] Other:    G   GOALS:  Patient stated goal: \" I want to have less pain \"  [] Progressing: [x] Met: [] Not Met: [] Adjusted     Therapist goals for Patient:   Short Term Goals: To be achieved in: 2 weeks  1. Independent in HEP and progression per patient tolerance, in order to prevent re-injury. [] Progressing: [x] Met: [] Not Met: [] Adjusted  2. Patient will have a decrease in pain to facilitate improvement in movement, function, and ADLs as indicated by Functional Deficits. [] Progressing: [x] Met: [] Not Met: [] Adjusted     Long Term Goals: To be achieved in: 8 weeks  1. Decrease womac functional outcome score from 23 to 11  to assist with reaching prior level of function. [] Progressing: [x] Met: [] Not Met: [] Adjusted  2.  Patient will demonstrate increased AROM to wfl to allow for proper joint functioning as indicated by patients Functional

## 2023-08-25 ENCOUNTER — APPOINTMENT (OUTPATIENT)
Dept: PHYSICAL THERAPY | Age: 78
End: 2023-08-25
Payer: MEDICARE

## 2023-08-31 ENCOUNTER — TELEPHONE (OUTPATIENT)
Dept: FAMILY MEDICINE CLINIC | Age: 78
End: 2023-08-31

## 2023-08-31 NOTE — TELEPHONE ENCOUNTER
Saw that patient is not scheduled until December which I think is past the open enrollment for medicare. Patient has [employer]. Advised patient to start looking now if he would like to continue being seen here so he can choose a new plan. December appointment may need to be re-scheduled for January if he switches insurance.

## 2023-10-24 DIAGNOSIS — K21.9 GASTROESOPHAGEAL REFLUX DISEASE, UNSPECIFIED WHETHER ESOPHAGITIS PRESENT: ICD-10-CM

## 2023-10-24 DIAGNOSIS — I10 ESSENTIAL HYPERTENSION: ICD-10-CM

## 2023-10-24 RX ORDER — ESOMEPRAZOLE MAGNESIUM 40 MG/1
CAPSULE, DELAYED RELEASE ORAL
Qty: 90 CAPSULE | Refills: 0 | Status: SHIPPED | OUTPATIENT
Start: 2023-10-24

## 2023-10-24 RX ORDER — LOSARTAN POTASSIUM 100 MG/1
TABLET ORAL
Qty: 90 TABLET | Refills: 0 | Status: SHIPPED | OUTPATIENT
Start: 2023-10-24

## 2023-10-24 RX ORDER — AMLODIPINE BESYLATE 5 MG/1
TABLET ORAL
Qty: 90 TABLET | Refills: 0 | Status: SHIPPED | OUTPATIENT
Start: 2023-10-24

## 2023-11-06 LAB — PROSTATE SPECIFIC ANTIGEN: 0.01 NG/ML

## 2023-12-04 ENCOUNTER — OFFICE VISIT (OUTPATIENT)
Dept: FAMILY MEDICINE CLINIC | Age: 78
End: 2023-12-04
Payer: MEDICARE

## 2023-12-04 VITALS
SYSTOLIC BLOOD PRESSURE: 132 MMHG | HEIGHT: 73 IN | BODY MASS INDEX: 29.16 KG/M2 | WEIGHT: 220 LBS | RESPIRATION RATE: 18 BRPM | DIASTOLIC BLOOD PRESSURE: 86 MMHG | OXYGEN SATURATION: 96 % | TEMPERATURE: 98.4 F | HEART RATE: 76 BPM

## 2023-12-04 DIAGNOSIS — E78.00 HYPERCHOLESTEREMIA: Primary | ICD-10-CM

## 2023-12-04 DIAGNOSIS — I71.40 ABDOMINAL AORTIC ANEURYSM (AAA) WITHOUT RUPTURE, UNSPECIFIED PART (HCC): ICD-10-CM

## 2023-12-04 DIAGNOSIS — E78.00 HYPERCHOLESTEREMIA: ICD-10-CM

## 2023-12-04 DIAGNOSIS — K21.9 GASTROESOPHAGEAL REFLUX DISEASE, UNSPECIFIED WHETHER ESOPHAGITIS PRESENT: ICD-10-CM

## 2023-12-04 DIAGNOSIS — D75.89 MACROCYTOSIS: ICD-10-CM

## 2023-12-04 DIAGNOSIS — R73.03 PREDIABETES: ICD-10-CM

## 2023-12-04 DIAGNOSIS — I10 ESSENTIAL HYPERTENSION: ICD-10-CM

## 2023-12-04 LAB
ALBUMIN SERPL-MCNC: 4.8 G/DL (ref 3.4–5)
ALBUMIN/GLOB SERPL: 2 {RATIO} (ref 1.1–2.2)
ALP SERPL-CCNC: 86 U/L (ref 40–129)
ALT SERPL-CCNC: 26 U/L (ref 10–40)
ANION GAP SERPL CALCULATED.3IONS-SCNC: 10 MMOL/L (ref 3–16)
AST SERPL-CCNC: 29 U/L (ref 15–37)
BASOPHILS # BLD: 0.1 K/UL (ref 0–0.2)
BASOPHILS NFR BLD: 1.1 %
BILIRUB SERPL-MCNC: 0.6 MG/DL (ref 0–1)
BUN SERPL-MCNC: 17 MG/DL (ref 7–20)
CALCIUM SERPL-MCNC: 10.1 MG/DL (ref 8.3–10.6)
CHLORIDE SERPL-SCNC: 102 MMOL/L (ref 99–110)
CO2 SERPL-SCNC: 28 MMOL/L (ref 21–32)
CREAT SERPL-MCNC: 0.7 MG/DL (ref 0.8–1.3)
DEPRECATED RDW RBC AUTO: 14 % (ref 12.4–15.4)
EOSINOPHIL # BLD: 0.2 K/UL (ref 0–0.6)
EOSINOPHIL NFR BLD: 3.3 %
GFR SERPLBLD CREATININE-BSD FMLA CKD-EPI: >60 ML/MIN/{1.73_M2}
GLUCOSE SERPL-MCNC: 99 MG/DL (ref 70–99)
HCT VFR BLD AUTO: 46.6 % (ref 40.5–52.5)
HGB BLD-MCNC: 16.1 G/DL (ref 13.5–17.5)
LYMPHOCYTES # BLD: 1.4 K/UL (ref 1–5.1)
LYMPHOCYTES NFR BLD: 29.4 %
MCH RBC QN AUTO: 35.7 PG (ref 26–34)
MCHC RBC AUTO-ENTMCNC: 34.5 G/DL (ref 31–36)
MCV RBC AUTO: 103.5 FL (ref 80–100)
MONOCYTES # BLD: 0.5 K/UL (ref 0–1.3)
MONOCYTES NFR BLD: 11.2 %
NEUTROPHILS # BLD: 2.6 K/UL (ref 1.7–7.7)
NEUTROPHILS NFR BLD: 55 %
PLATELET # BLD AUTO: 218 K/UL (ref 135–450)
PMV BLD AUTO: 9 FL (ref 5–10.5)
POTASSIUM SERPL-SCNC: 5.6 MMOL/L (ref 3.5–5.1)
PROT SERPL-MCNC: 7.2 G/DL (ref 6.4–8.2)
RBC # BLD AUTO: 4.51 M/UL (ref 4.2–5.9)
SODIUM SERPL-SCNC: 140 MMOL/L (ref 136–145)
WBC # BLD AUTO: 4.7 K/UL (ref 4–11)

## 2023-12-04 PROCEDURE — 99214 OFFICE O/P EST MOD 30 MIN: CPT | Performed by: NURSE PRACTITIONER

## 2023-12-04 PROCEDURE — 1123F ACP DISCUSS/DSCN MKR DOCD: CPT | Performed by: NURSE PRACTITIONER

## 2023-12-04 PROCEDURE — 3075F SYST BP GE 130 - 139MM HG: CPT | Performed by: NURSE PRACTITIONER

## 2023-12-04 PROCEDURE — 3079F DIAST BP 80-89 MM HG: CPT | Performed by: NURSE PRACTITIONER

## 2023-12-04 ASSESSMENT — ENCOUNTER SYMPTOMS
SHORTNESS OF BREATH: 0
DIARRHEA: 0
VOMITING: 0
CONSTIPATION: 0
NAUSEA: 0
CHEST TIGHTNESS: 0
COUGH: 0

## 2023-12-05 DIAGNOSIS — E87.5 HYPERKALEMIA: Primary | ICD-10-CM

## 2023-12-05 LAB
EST. AVERAGE GLUCOSE BLD GHB EST-MCNC: 111.2 MG/DL
HBA1C MFR BLD: 5.5 %

## 2023-12-06 DIAGNOSIS — E87.5 HYPERKALEMIA: ICD-10-CM

## 2023-12-06 LAB — POTASSIUM SERPL-SCNC: 5.4 MMOL/L (ref 3.5–5.1)

## 2023-12-07 DIAGNOSIS — E87.5 HYPERKALEMIA: Primary | ICD-10-CM

## 2023-12-08 ENCOUNTER — HOSPITAL ENCOUNTER (OUTPATIENT)
Dept: ULTRASOUND IMAGING | Age: 78
Discharge: HOME | End: 2023-12-08
Payer: MEDICARE

## 2023-12-08 DIAGNOSIS — I71.40 ABDOMINAL AORTIC ANEURYSM (AAA) WITHOUT RUPTURE, UNSPECIFIED PART (HCC): ICD-10-CM

## 2023-12-08 DIAGNOSIS — I71.40 ABDOMINAL AORTIC ANEURYSM (AAA) WITHOUT RUPTURE, UNSPECIFIED PART (HCC): Primary | ICD-10-CM

## 2023-12-08 PROCEDURE — 76775 US EXAM ABDO BACK WALL LIM: CPT

## 2023-12-26 DIAGNOSIS — E87.5 HYPERKALEMIA: ICD-10-CM

## 2023-12-26 LAB
ANION GAP SERPL CALCULATED.3IONS-SCNC: 8 MMOL/L (ref 3–16)
BUN SERPL-MCNC: 11 MG/DL (ref 7–20)
CALCIUM SERPL-MCNC: 9.4 MG/DL (ref 8.3–10.6)
CHLORIDE SERPL-SCNC: 102 MMOL/L (ref 99–110)
CO2 SERPL-SCNC: 28 MMOL/L (ref 21–32)
CREAT SERPL-MCNC: 0.7 MG/DL (ref 0.8–1.3)
GFR SERPLBLD CREATININE-BSD FMLA CKD-EPI: >60 ML/MIN/{1.73_M2}
GLUCOSE SERPL-MCNC: 100 MG/DL (ref 70–99)
POTASSIUM SERPL-SCNC: 5 MMOL/L (ref 3.5–5.1)
SODIUM SERPL-SCNC: 138 MMOL/L (ref 136–145)

## 2024-01-12 DIAGNOSIS — K21.9 GASTROESOPHAGEAL REFLUX DISEASE, UNSPECIFIED WHETHER ESOPHAGITIS PRESENT: ICD-10-CM

## 2024-01-12 DIAGNOSIS — I10 ESSENTIAL HYPERTENSION: ICD-10-CM

## 2024-01-12 RX ORDER — LOSARTAN POTASSIUM 100 MG/1
TABLET ORAL
Qty: 90 TABLET | Refills: 0 | Status: SHIPPED | OUTPATIENT
Start: 2024-01-12

## 2024-01-12 RX ORDER — ESOMEPRAZOLE MAGNESIUM 40 MG/1
CAPSULE, DELAYED RELEASE ORAL
Qty: 90 CAPSULE | Refills: 0 | Status: SHIPPED | OUTPATIENT
Start: 2024-01-12

## 2024-01-12 RX ORDER — AMLODIPINE BESYLATE 5 MG/1
TABLET ORAL
Qty: 90 TABLET | Refills: 0 | Status: SHIPPED | OUTPATIENT
Start: 2024-01-12

## 2024-01-12 RX ORDER — ATORVASTATIN CALCIUM 80 MG/1
TABLET, FILM COATED ORAL
Qty: 90 TABLET | Refills: 0 | Status: SHIPPED | OUTPATIENT
Start: 2024-01-12

## 2024-04-08 DIAGNOSIS — K21.9 GASTROESOPHAGEAL REFLUX DISEASE, UNSPECIFIED WHETHER ESOPHAGITIS PRESENT: ICD-10-CM

## 2024-04-08 DIAGNOSIS — I10 ESSENTIAL HYPERTENSION: ICD-10-CM

## 2024-04-08 RX ORDER — ESOMEPRAZOLE MAGNESIUM 40 MG/1
CAPSULE, DELAYED RELEASE ORAL
Qty: 90 CAPSULE | Refills: 1 | Status: SHIPPED | OUTPATIENT
Start: 2024-04-08

## 2024-04-08 RX ORDER — AMLODIPINE BESYLATE 5 MG/1
TABLET ORAL
Qty: 90 TABLET | Refills: 1 | Status: SHIPPED | OUTPATIENT
Start: 2024-04-08

## 2024-04-08 RX ORDER — ATORVASTATIN CALCIUM 80 MG/1
TABLET, FILM COATED ORAL
Qty: 90 TABLET | Refills: 1 | Status: SHIPPED | OUTPATIENT
Start: 2024-04-08

## 2024-04-08 RX ORDER — LOSARTAN POTASSIUM 100 MG/1
TABLET ORAL
Qty: 90 TABLET | Refills: 1 | Status: SHIPPED | OUTPATIENT
Start: 2024-04-08

## 2024-04-08 NOTE — TELEPHONE ENCOUNTER
Called the patient. He would like rx's sent to [facility] on [address].  Pharmacy adjusted to rx's  
Pharmacy in [place] is pended. Is he still in [place] or does he want sent to a different pharmacy?   
Detail Level: Generalized
Detail Level: Zone
Sunscreen Recommendations: Suggest of sunscreen of SPF 30 or higher.

## 2024-05-09 ENCOUNTER — OFFICE VISIT (OUTPATIENT)
Dept: FAMILY MEDICINE CLINIC | Age: 79
End: 2024-05-09
Payer: MEDICARE

## 2024-05-09 VITALS
HEART RATE: 71 BPM | SYSTOLIC BLOOD PRESSURE: 126 MMHG | TEMPERATURE: 98.3 F | BODY MASS INDEX: 28.36 KG/M2 | DIASTOLIC BLOOD PRESSURE: 76 MMHG | OXYGEN SATURATION: 96 % | WEIGHT: 214 LBS | RESPIRATION RATE: 18 BRPM | HEIGHT: 73 IN

## 2024-05-09 DIAGNOSIS — H81.11 BPPV (BENIGN PAROXYSMAL POSITIONAL VERTIGO), RIGHT: Primary | ICD-10-CM

## 2024-05-09 DIAGNOSIS — H81.11 BPPV (BENIGN PAROXYSMAL POSITIONAL VERTIGO), RIGHT: ICD-10-CM

## 2024-05-09 DIAGNOSIS — I10 ESSENTIAL HYPERTENSION: ICD-10-CM

## 2024-05-09 LAB
ALBUMIN SERPL-MCNC: 4.5 G/DL (ref 3.4–5)
ALBUMIN/GLOB SERPL: 1.7 {RATIO} (ref 1.1–2.2)
ALP SERPL-CCNC: 69 U/L (ref 40–129)
ALT SERPL-CCNC: 22 U/L (ref 10–40)
ANION GAP SERPL CALCULATED.3IONS-SCNC: 10 MMOL/L (ref 3–16)
AST SERPL-CCNC: 25 U/L (ref 15–37)
BASOPHILS # BLD: 0 K/UL (ref 0–0.2)
BASOPHILS NFR BLD: 1.1 %
BILIRUB SERPL-MCNC: 0.6 MG/DL (ref 0–1)
BUN SERPL-MCNC: 12 MG/DL (ref 7–20)
CALCIUM SERPL-MCNC: 9.9 MG/DL (ref 8.3–10.6)
CHLORIDE SERPL-SCNC: 106 MMOL/L (ref 99–110)
CO2 SERPL-SCNC: 27 MMOL/L (ref 21–32)
CREAT SERPL-MCNC: 0.7 MG/DL (ref 0.8–1.3)
DEPRECATED RDW RBC AUTO: 13.6 % (ref 12.4–15.4)
EOSINOPHIL # BLD: 0.1 K/UL (ref 0–0.6)
EOSINOPHIL NFR BLD: 2.9 %
GFR SERPLBLD CREATININE-BSD FMLA CKD-EPI: >90 ML/MIN/{1.73_M2}
GLUCOSE SERPL-MCNC: 94 MG/DL (ref 70–99)
HCT VFR BLD AUTO: 43.7 % (ref 40.5–52.5)
HGB BLD-MCNC: 15.4 G/DL (ref 13.5–17.5)
LYMPHOCYTES # BLD: 1.1 K/UL (ref 1–5.1)
LYMPHOCYTES NFR BLD: 27 %
MCH RBC QN AUTO: 36.4 PG (ref 26–34)
MCHC RBC AUTO-ENTMCNC: 35.2 G/DL (ref 31–36)
MCV RBC AUTO: 103.5 FL (ref 80–100)
MONOCYTES # BLD: 0.5 K/UL (ref 0–1.3)
MONOCYTES NFR BLD: 12 %
NEUTROPHILS # BLD: 2.4 K/UL (ref 1.7–7.7)
NEUTROPHILS NFR BLD: 57 %
PLATELET # BLD AUTO: 207 K/UL (ref 135–450)
PMV BLD AUTO: 9.1 FL (ref 5–10.5)
POTASSIUM SERPL-SCNC: 4.2 MMOL/L (ref 3.5–5.1)
PROT SERPL-MCNC: 7.1 G/DL (ref 6.4–8.2)
RBC # BLD AUTO: 4.22 M/UL (ref 4.2–5.9)
SODIUM SERPL-SCNC: 143 MMOL/L (ref 136–145)
WBC # BLD AUTO: 4.2 K/UL (ref 4–11)

## 2024-05-09 PROCEDURE — 1123F ACP DISCUSS/DSCN MKR DOCD: CPT | Performed by: NURSE PRACTITIONER

## 2024-05-09 PROCEDURE — G2211 COMPLEX E/M VISIT ADD ON: HCPCS | Performed by: NURSE PRACTITIONER

## 2024-05-09 PROCEDURE — 99214 OFFICE O/P EST MOD 30 MIN: CPT | Performed by: NURSE PRACTITIONER

## 2024-05-09 PROCEDURE — 3078F DIAST BP <80 MM HG: CPT | Performed by: NURSE PRACTITIONER

## 2024-05-09 PROCEDURE — 3074F SYST BP LT 130 MM HG: CPT | Performed by: NURSE PRACTITIONER

## 2024-05-09 ASSESSMENT — PATIENT HEALTH QUESTIONNAIRE - PHQ9
SUM OF ALL RESPONSES TO PHQ QUESTIONS 1-9: 0
2. FEELING DOWN, DEPRESSED OR HOPELESS: NOT AT ALL
SUM OF ALL RESPONSES TO PHQ QUESTIONS 1-9: 0
SUM OF ALL RESPONSES TO PHQ QUESTIONS 1-9: 0
SUM OF ALL RESPONSES TO PHQ9 QUESTIONS 1 & 2: 0
SUM OF ALL RESPONSES TO PHQ QUESTIONS 1-9: 0
1. LITTLE INTEREST OR PLEASURE IN DOING THINGS: NOT AT ALL

## 2024-05-09 NOTE — PROGRESS NOTES
5/9/2024    This is a 78 y.o. male   Chief Complaint   Patient presents with    Dizziness     Started Monday. Was at dentist and after laying down was dizzy getting up. Had another incident yesterday. Both times happened when he stood up.A couple weeks ago he noticed vision changes.    .    HPI    Reports that his right eye vision has decreased. Noticed a couple of weeks ago. Has blurry vision. Has f/u with his eye specialist in a couple of weeks. He has history of blindness in left eye from previous eye injury multiple years ago.     Patient reports that yesterday had a dental appt and was laying down for a while. After he stood up he felt off balanced. On Monday when he was trying to get up out of bed he fell back into bed. Denies dizziness, felt off balanced.     Hypertension:  Home blood pressure monitoring: Yes - 132/88.  He is adherent to a low sodium diet. Patient denies chest pain, shortness of breath, headache, peripheral edema, and palpitations.  Antihypertensive medication side effects: no medication side effects noted.    On amlodipine 5 mg daily and losartan 100 mg daily                                       Sodium (mmol/L)   Date Value   12/26/2023 138    BUN (mg/dL)   Date Value   12/26/2023 11    Glucose (mg/dL)   Date Value   12/26/2023 100 (H)   07/13/2011 93      Potassium (mmol/L)   Date Value   12/26/2023 5.0     Potassium reflex Magnesium (mmol/L)   Date Value   08/07/2019 3.6    Creatinine (mg/dL)   Date Value   12/26/2023 0.7 (L)              Patient Active Problem List   Diagnosis    History of adenocarcinoma of prostate    Blindness of left eye    AAA (abdominal aortic aneurysm)- 3.9cm 12/2020, 4.0 cm 12/2022 repeat in 1 year    Diverticula, colon    Chronic non-seasonal allergic rhinitis    Glaucoma-sees dr Melendez    BPH w/o urinary obs/LUTS--s/p prostatectomy    Hypercholesteremia    GERD (gastroesophageal reflux disease)--on daily ppi    Colonic polyp-(last colo 3/16--pos-repeat

## 2024-05-10 ENCOUNTER — TELEPHONE (OUTPATIENT)
Dept: FAMILY MEDICINE CLINIC | Age: 79
End: 2024-05-10

## 2024-05-15 ASSESSMENT — ENCOUNTER SYMPTOMS
COUGH: 0
SHORTNESS OF BREATH: 0

## 2024-05-15 ASSESSMENT — VISUAL ACUITY: OU: 1

## 2024-05-17 ENCOUNTER — TELEPHONE (OUTPATIENT)
Dept: FAMILY MEDICINE CLINIC | Age: 79
End: 2024-05-17

## 2024-05-17 NOTE — TELEPHONE ENCOUNTER
Patient just wanted to let Stephen know that he left Alayna a message on my chart on 5/10/24 regarding his eye appointment     DAVE

## 2024-05-20 PROBLEM — H35.30 AMD (AGE RELATED MACULAR DEGENERATION): Status: ACTIVE | Noted: 2024-05-20

## 2024-05-23 ENCOUNTER — HOSPITAL ENCOUNTER (OUTPATIENT)
Dept: PHYSICAL THERAPY | Age: 79
Setting detail: THERAPIES SERIES
Discharge: HOME | End: 2024-05-23
Payer: MEDICARE

## 2024-05-23 DIAGNOSIS — Z74.09 IMPAIRED MOBILITY AND ADLS: ICD-10-CM

## 2024-05-23 DIAGNOSIS — R42 DIZZINESS: Primary | ICD-10-CM

## 2024-05-23 DIAGNOSIS — Z78.9 IMPAIRED MOBILITY AND ADLS: ICD-10-CM

## 2024-05-23 DIAGNOSIS — H81.11 BENIGN PAROXYSMAL POSITIONAL VERTIGO, RIGHT: ICD-10-CM

## 2024-05-23 DIAGNOSIS — R26.81 GAIT INSTABILITY: ICD-10-CM

## 2024-05-23 PROCEDURE — 97530 THERAPEUTIC ACTIVITIES: CPT

## 2024-05-23 PROCEDURE — 97161 PT EVAL LOW COMPLEX 20 MIN: CPT

## 2024-05-23 PROCEDURE — 95992 CANALITH REPOSITIONING PROC: CPT

## 2024-05-23 NOTE — PLAN OF CARE
functional performance. (Ex include squatting, ascending/descending stairs, walking, bending, lifting, catching, throwing, pushing, pulling, jumping.)  Direct, one on one contact, billed in 15-minute increments.  (78922) CANALITH REPOSITIONING TECHNIQUE - Provided a non-invasive treatment that involved moving the patients head and/or body into specific positions to relieve BPPV or dizziness with the changes in head and/or body positions.      GOALS     Patient stated goal: \"Get rid of my dizziness\"  Status:  [] Progressing: [] Met: [] Not Met: [] Adjusted    Therapist goals for Patient:   Short Term Goals: To be achieved in: 2 weeks  Independent in HEP and progression per patient tolerance, in order to progress toward full function.    Status: [] Progressing: [] Met: [] Not Met: [] Adjusted  Patient's subjective complaint of dizziness/imbalance/symptoms to decrease by 40% to tolerate functional activities.   Status: [] Progressing: [] Met: [] Not Met: [] Adjusted    Long Term Goals: To be achieved in: at discharge  DHI score of 5 or less to assist with return to prior level of function.    Status: [] Progressing: [] Met: [] Not Met: [] Adjusted  Patient will return to bed mobility without increased symptoms or restriction to work towards return to prior level of function.        Status: [] Progressing: [] Met: [] Not Met: [] Adjusted  Patient will be able to bend forward to put on shoes/socks without increased symptoms.              Status: [] Progressing: [] Met: [] Not Met: [] Adjusted    Overall Progression Towards Functional goals/ Treatment Progress Update:  [] Patient is progressing as expected towards functional goals listed.    [] Progression is slowed due to complexities/Impairments listed.  [] Progression has been slowed due to co-morbidities.  [x] Plan just implemented, too soon (<30days) to assess goals progression   [] Goals require adjustment due to lack of progress  [] Patient is not progressing as

## 2024-07-08 ENCOUNTER — OFFICE VISIT (OUTPATIENT)
Dept: FAMILY MEDICINE CLINIC | Age: 79
End: 2024-07-08
Payer: MEDICARE

## 2024-07-08 VITALS
WEIGHT: 212.4 LBS | TEMPERATURE: 98.3 F | BODY MASS INDEX: 28.15 KG/M2 | DIASTOLIC BLOOD PRESSURE: 80 MMHG | SYSTOLIC BLOOD PRESSURE: 120 MMHG | HEIGHT: 73 IN | OXYGEN SATURATION: 99 % | HEART RATE: 73 BPM | RESPIRATION RATE: 18 BRPM

## 2024-07-08 DIAGNOSIS — Z00.00 MEDICARE ANNUAL WELLNESS VISIT, SUBSEQUENT: Primary | ICD-10-CM

## 2024-07-08 DIAGNOSIS — R73.03 PREDIABETES: ICD-10-CM

## 2024-07-08 DIAGNOSIS — E78.00 HYPERCHOLESTEREMIA: ICD-10-CM

## 2024-07-08 DIAGNOSIS — K21.9 GASTROESOPHAGEAL REFLUX DISEASE, UNSPECIFIED WHETHER ESOPHAGITIS PRESENT: ICD-10-CM

## 2024-07-08 DIAGNOSIS — I71.40 ABDOMINAL AORTIC ANEURYSM (AAA) WITHOUT RUPTURE, UNSPECIFIED PART (HCC): ICD-10-CM

## 2024-07-08 DIAGNOSIS — I10 ESSENTIAL HYPERTENSION: ICD-10-CM

## 2024-07-08 PROCEDURE — 99213 OFFICE O/P EST LOW 20 MIN: CPT | Performed by: NURSE PRACTITIONER

## 2024-07-08 PROCEDURE — 1123F ACP DISCUSS/DSCN MKR DOCD: CPT | Performed by: NURSE PRACTITIONER

## 2024-07-08 PROCEDURE — 3079F DIAST BP 80-89 MM HG: CPT | Performed by: NURSE PRACTITIONER

## 2024-07-08 PROCEDURE — G0439 PPPS, SUBSEQ VISIT: HCPCS | Performed by: NURSE PRACTITIONER

## 2024-07-08 PROCEDURE — 3074F SYST BP LT 130 MM HG: CPT | Performed by: NURSE PRACTITIONER

## 2024-07-08 SDOH — ECONOMIC STABILITY: FOOD INSECURITY: WITHIN THE PAST 12 MONTHS, YOU WORRIED THAT YOUR FOOD WOULD RUN OUT BEFORE YOU GOT MONEY TO BUY MORE.: NEVER TRUE

## 2024-07-08 SDOH — ECONOMIC STABILITY: FOOD INSECURITY: WITHIN THE PAST 12 MONTHS, THE FOOD YOU BOUGHT JUST DIDN'T LAST AND YOU DIDN'T HAVE MONEY TO GET MORE.: NEVER TRUE

## 2024-07-08 SDOH — ECONOMIC STABILITY: INCOME INSECURITY: HOW HARD IS IT FOR YOU TO PAY FOR THE VERY BASICS LIKE FOOD, HOUSING, MEDICAL CARE, AND HEATING?: NOT HARD AT ALL

## 2024-07-08 ASSESSMENT — LIFESTYLE VARIABLES
HOW OFTEN DO YOU HAVE A DRINK CONTAINING ALCOHOL: 4 OR MORE TIMES A WEEK
HAVE YOU OR SOMEONE ELSE BEEN INJURED AS A RESULT OF YOUR DRINKING: NO
HOW OFTEN DURING THE LAST YEAR HAVE YOU FAILED TO DO WHAT WAS NORMALLY EXPECTED FROM YOU BECAUSE OF DRINKING: NEVER
HAS A RELATIVE, FRIEND, DOCTOR, OR ANOTHER HEALTH PROFESSIONAL EXPRESSED CONCERN ABOUT YOUR DRINKING OR SUGGESTED YOU CUT DOWN: NO
HOW OFTEN DURING THE LAST YEAR HAVE YOU NEEDED AN ALCOHOLIC DRINK FIRST THING IN THE MORNING TO GET YOURSELF GOING AFTER A NIGHT OF HEAVY DRINKING: NEVER
HOW OFTEN DURING THE LAST YEAR HAVE YOU BEEN UNABLE TO REMEMBER WHAT HAPPENED THE NIGHT BEFORE BECAUSE YOU HAD BEEN DRINKING: NEVER
HOW MANY STANDARD DRINKS CONTAINING ALCOHOL DO YOU HAVE ON A TYPICAL DAY: 1 OR 2
HOW OFTEN DURING THE LAST YEAR HAVE YOU HAD A FEELING OF GUILT OR REMORSE AFTER DRINKING: NEVER
HOW OFTEN DURING THE LAST YEAR HAVE YOU FOUND THAT YOU WERE NOT ABLE TO STOP DRINKING ONCE YOU HAD STARTED: NEVER

## 2024-07-08 ASSESSMENT — PATIENT HEALTH QUESTIONNAIRE - PHQ9
2. FEELING DOWN, DEPRESSED OR HOPELESS: NOT AT ALL
1. LITTLE INTEREST OR PLEASURE IN DOING THINGS: NOT AT ALL
SUM OF ALL RESPONSES TO PHQ QUESTIONS 1-9: 0
SUM OF ALL RESPONSES TO PHQ9 QUESTIONS 1 & 2: 0
SUM OF ALL RESPONSES TO PHQ QUESTIONS 1-9: 0

## 2024-07-08 NOTE — PROGRESS NOTES
Medicare Annual Wellness Visit    Aureliano Campbell is here for Medicare AWV (Has had 3 eye treatments and eye doctor is optimistic. Dizziness is better with pt. )    Assessment & Plan   Medicare annual wellness visit, subsequent  Healthy lifestyles reviewed: Diet, aerobic exercise, sunscreen, vision and dental exams.  Advised to obtain RSV vaccine at his pharmacy.    Hypercholesteremia  -     Lipid, Fasting; Future  -     Lipids stable 6/2023- repeat   On atorvastatin 80 mg daily.  Diet and weight loss discussed and needed.    Prediabetes  Low-carb diet, aerobic exercise, and weight loss discussed in needed.    Gastroesophageal reflux disease, unspecified whether esophagitis present  Stable on Nexium 40 mg daily. He has not been able to wean off medication in the past due to recurrent symptoms.   Advised to avoid foods that cause reflux.     Abdominal aortic aneurysm (AAA) without rupture, unspecified part (HCC)  -     US ABDOMINAL AORTA LIMITED; Future  Asymptomatic. Recheck ultrasound Dec 2024    Essential hypertension  Controlled. On losartan 100 mg daily and amlodipine 5 mg daily.   Low-salt diet, aerobic exercise, and weight loss discussed in needed.       Recommendations for Preventive Services Due: see orders and patient instructions/AVS.  Recommended screening schedule for the next 5-10 years is provided to the patient in written form: see Patient Instructions/AVS.     Return in about 5 months (around 12/8/2024), or if symptoms worsen or fail to improve, for chronic conditions.     Subjective   The following acute and/or chronic problems were also addressed today:    Hypertension:  Home blood pressure monitoring: No.  He is adherent to a low sodium diet. Patient denies chest pain, shortness of breath, peripheral edema, palpitations and dry cough.  Antihypertensive medication side effects: no medication side effects noted.  Had dry cough on lisinopril. Cough resolved on losartan.

## 2024-07-12 DIAGNOSIS — E78.00 HYPERCHOLESTEREMIA: ICD-10-CM

## 2024-07-12 LAB
CHOLEST SERPL-MCNC: 196 MG/DL (ref 0–199)
HDLC SERPL-MCNC: 66 MG/DL (ref 40–60)
LDL CHOLESTEROL: 106 MG/DL
TRIGL SERPL-MCNC: 118 MG/DL (ref 0–150)
VLDLC SERPL CALC-MCNC: 24 MG/DL

## 2024-08-05 ENCOUNTER — OFFICE VISIT (OUTPATIENT)
Dept: FAMILY MEDICINE CLINIC | Age: 79
End: 2024-08-05

## 2024-08-05 VITALS
SYSTOLIC BLOOD PRESSURE: 132 MMHG | RESPIRATION RATE: 18 BRPM | HEART RATE: 90 BPM | TEMPERATURE: 98.1 F | BODY MASS INDEX: 27.78 KG/M2 | OXYGEN SATURATION: 95 % | WEIGHT: 209.6 LBS | HEIGHT: 73 IN | DIASTOLIC BLOOD PRESSURE: 86 MMHG

## 2024-08-05 DIAGNOSIS — L02.212 ABSCESS OF BACK: Primary | ICD-10-CM

## 2024-08-05 RX ORDER — LIDOCAINE HYDROCHLORIDE AND EPINEPHRINE BITARTRATE 20; .01 MG/ML; MG/ML
3 INJECTION, SOLUTION SUBCUTANEOUS ONCE
Status: COMPLETED | OUTPATIENT
Start: 2024-08-05 | End: 2024-08-05

## 2024-08-05 RX ORDER — DOXYCYCLINE HYCLATE 100 MG
100 TABLET ORAL 2 TIMES DAILY
Qty: 20 TABLET | Refills: 0 | Status: SHIPPED | OUTPATIENT
Start: 2024-08-05 | End: 2024-08-15

## 2024-08-05 RX ORDER — LIDOCAINE HYDROCHLORIDE AND EPINEPHRINE 10; 10 MG/ML; UG/ML
3 INJECTION, SOLUTION INFILTRATION; PERINEURAL ONCE
Status: DISCONTINUED | OUTPATIENT
Start: 2024-08-05 | End: 2024-08-05

## 2024-08-05 RX ADMIN — LIDOCAINE HYDROCHLORIDE AND EPINEPHRINE BITARTRATE 3 ML: 20; .01 INJECTION, SOLUTION SUBCUTANEOUS at 17:12

## 2024-08-05 NOTE — PROGRESS NOTES
8/5/2024    This is a 78 y.o. male   Chief Complaint   Patient presents with    Skin Problem     X3 weeks.  Rt upper back.  Red. Swollen. Painful to the touch. Draining.    .    HPI  Patient reports that he has a lesion on right upper back that has been swollen and tender to palpation. Has drained a small amount of material. Denies fever or chills. Has applied antibiotic ointment without improvement in symptoms.      Patient Active Problem List   Diagnosis    History of adenocarcinoma of prostate    Blindness of left eye    AAA (abdominal aortic aneurysm)- 3.9cm 12/2020, 4.0 cm 12/2022 repeat in 1 year    Diverticula, colon    Chronic non-seasonal allergic rhinitis    Glaucoma-sees dr Melendez    BPH w/o urinary obs/LUTS--s/p prostatectomy    Hypercholesteremia    GERD (gastroesophageal reflux disease)--on daily ppi    Colonic polyp-(last colo 3/16--pos-repeat 3-5yrs)-dr mccarty    ED (erectile dysfunction)--started on daily cialis by dr Pritchard-urology    Incisional hernia, without obstruction or gangrene    Prediabetes    Essential hypertension    AMD (age related macular degeneration)       Current Outpatient Medications   Medication Sig Dispense Refill    Aflibercept (EYLEA HD IZ) 1 Dose by Intravitreal route every 30 days      atorvastatin (LIPITOR) 80 MG tablet TAKE 1 TABLET BY MOUTH EVERY DAY 90 tablet 1    losartan (COZAAR) 100 MG tablet TAKE 1 TABLET BY MOUTH EVERY DAY 90 tablet 1    esomeprazole (NEXIUM) 40 MG delayed release capsule TAKE 1 CAPSULE BY MOUTH EVERY DAY IN THE MORNING BEFORE BREAKFAST 90 capsule 1    amLODIPine (NORVASC) 5 MG tablet TAKE 1 TABLET BY MOUTH EVERY DAY 90 tablet 1    amoxicillin (AMOXIL) 500 MG capsule TAKE 4 CAPSULES BY MOUTH ONE HOUR BEFORE YOUR APPOINTMENT      cetirizine-psuedoephedrine (ZYRTEC-D) 5-120 MG per extended release tablet Take 1 tablet by mouth 2 times daily      polyethylene glycol (GLYCOLAX) powder Take 17 g by mouth daily      tadalafil (CIALIS) 20 MG tablet Take 1

## 2024-08-05 NOTE — PATIENT INSTRUCTIONS
Should take an over-the-counter probiotic daily while on the antibiotic to help prevent antibiotic associated diarrhea.

## 2024-08-07 ASSESSMENT — ENCOUNTER SYMPTOMS: COLOR CHANGE: 1

## 2024-08-08 ENCOUNTER — OFFICE VISIT (OUTPATIENT)
Dept: FAMILY MEDICINE CLINIC | Age: 79
End: 2024-08-08
Payer: MEDICARE

## 2024-08-08 VITALS
WEIGHT: 209.6 LBS | HEIGHT: 73 IN | HEART RATE: 77 BPM | OXYGEN SATURATION: 96 % | DIASTOLIC BLOOD PRESSURE: 80 MMHG | BODY MASS INDEX: 27.78 KG/M2 | SYSTOLIC BLOOD PRESSURE: 126 MMHG | RESPIRATION RATE: 18 BRPM | TEMPERATURE: 98.4 F

## 2024-08-08 DIAGNOSIS — L02.212 ABSCESS OF BACK: Primary | ICD-10-CM

## 2024-08-08 PROCEDURE — 3074F SYST BP LT 130 MM HG: CPT | Performed by: NURSE PRACTITIONER

## 2024-08-08 PROCEDURE — 99213 OFFICE O/P EST LOW 20 MIN: CPT | Performed by: NURSE PRACTITIONER

## 2024-08-08 PROCEDURE — 3079F DIAST BP 80-89 MM HG: CPT | Performed by: NURSE PRACTITIONER

## 2024-08-08 PROCEDURE — 1123F ACP DISCUSS/DSCN MKR DOCD: CPT | Performed by: NURSE PRACTITIONER

## 2024-08-08 NOTE — PROGRESS NOTES
8/8/2024    This is a 78 y.o. male   Chief Complaint   Patient presents with    Wound Check     Abscess on rt side of back.  Not draining. Not painful. Ribbon is still intact.    .    HPI  Patient is here to f/u on right back abscess and cellulitis that I&D was performed on 8/5/24. He has not yet removed the packing as he is not able to reach the area.   Has been taking doxycycline 100 mg BID. Tolerating antibiotic well.   Denies pain or fever.   Wound culture has not shown growth.      Patient Active Problem List   Diagnosis    History of adenocarcinoma of prostate    Blindness of left eye    AAA (abdominal aortic aneurysm)- 3.9cm 12/2020, 4.0 cm 12/2022 repeat in 1 year    Diverticula, colon    Chronic non-seasonal allergic rhinitis    Glaucoma-sees dr Melendez    BPH w/o urinary obs/LUTS--s/p prostatectomy    Hypercholesteremia    GERD (gastroesophageal reflux disease)--on daily ppi    Colonic polyp-(last colo 3/16--pos-repeat 3-5yrs)-dr mccarty    ED (erectile dysfunction)--started on daily cialis by dr Pritchard-urology    Incisional hernia, without obstruction or gangrene    Prediabetes    Essential hypertension    AMD (age related macular degeneration)       Current Outpatient Medications   Medication Sig Dispense Refill    doxycycline hyclate (VIBRA-TABS) 100 MG tablet Take 1 tablet by mouth 2 times daily for 10 days 20 tablet 0    Aflibercept (EYLEA HD IZ) 1 Dose by Intravitreal route every 30 days      atorvastatin (LIPITOR) 80 MG tablet TAKE 1 TABLET BY MOUTH EVERY DAY 90 tablet 1    losartan (COZAAR) 100 MG tablet TAKE 1 TABLET BY MOUTH EVERY DAY 90 tablet 1    esomeprazole (NEXIUM) 40 MG delayed release capsule TAKE 1 CAPSULE BY MOUTH EVERY DAY IN THE MORNING BEFORE BREAKFAST 90 capsule 1    amLODIPine (NORVASC) 5 MG tablet TAKE 1 TABLET BY MOUTH EVERY DAY 90 tablet 1    amoxicillin (AMOXIL) 500 MG capsule TAKE 4 CAPSULES BY MOUTH ONE HOUR BEFORE YOUR APPOINTMENT      cetirizine-psuedoephedrine (ZYRTEC-D) 5-120

## 2024-08-10 LAB
BACTERIA SPEC AEROBE CULT: ABNORMAL
BACTERIA SPEC AEROBE CULT: ABNORMAL
BACTERIA SPEC ANAEROBE CULT: ABNORMAL
GRAM STN SPEC: ABNORMAL
ORGANISM: ABNORMAL
ORGANISM: ABNORMAL

## 2024-09-16 ENCOUNTER — TELEPHONE (OUTPATIENT)
Dept: VASCULAR SURGERY | Age: 79
End: 2024-09-16

## 2024-09-18 ENCOUNTER — TELEPHONE (OUTPATIENT)
Dept: VASCULAR SURGERY | Age: 79
End: 2024-09-18

## 2024-09-22 DIAGNOSIS — K21.9 GASTROESOPHAGEAL REFLUX DISEASE, UNSPECIFIED WHETHER ESOPHAGITIS PRESENT: ICD-10-CM

## 2024-09-22 DIAGNOSIS — I10 ESSENTIAL HYPERTENSION: ICD-10-CM

## 2024-09-23 RX ORDER — ATORVASTATIN CALCIUM 80 MG/1
TABLET, FILM COATED ORAL
Qty: 90 TABLET | Refills: 1 | Status: SHIPPED | OUTPATIENT
Start: 2024-09-23

## 2024-09-23 RX ORDER — AMLODIPINE BESYLATE 5 MG/1
TABLET ORAL
Qty: 90 TABLET | Refills: 1 | Status: SHIPPED | OUTPATIENT
Start: 2024-09-23

## 2024-09-23 RX ORDER — ESOMEPRAZOLE MAGNESIUM 40 MG/1
CAPSULE, DELAYED RELEASE ORAL
Qty: 90 CAPSULE | Refills: 1 | Status: SHIPPED | OUTPATIENT
Start: 2024-09-23

## 2024-09-23 RX ORDER — LOSARTAN POTASSIUM 100 MG/1
TABLET ORAL
Qty: 90 TABLET | Refills: 1 | Status: SHIPPED | OUTPATIENT
Start: 2024-09-23

## 2024-11-04 LAB — PROSTATE SPECIFIC ANTIGEN: 0.01 NG/ML

## 2024-12-02 ENCOUNTER — OFFICE VISIT (OUTPATIENT)
Dept: FAMILY MEDICINE CLINIC | Age: 79
End: 2024-12-02

## 2024-12-02 VITALS
WEIGHT: 216 LBS | SYSTOLIC BLOOD PRESSURE: 130 MMHG | DIASTOLIC BLOOD PRESSURE: 80 MMHG | HEIGHT: 73 IN | TEMPERATURE: 98.1 F | OXYGEN SATURATION: 96 % | HEART RATE: 96 BPM | RESPIRATION RATE: 18 BRPM | BODY MASS INDEX: 28.63 KG/M2

## 2024-12-02 DIAGNOSIS — K21.9 GASTROESOPHAGEAL REFLUX DISEASE, UNSPECIFIED WHETHER ESOPHAGITIS PRESENT: ICD-10-CM

## 2024-12-02 DIAGNOSIS — R73.03 PREDIABETES: ICD-10-CM

## 2024-12-02 DIAGNOSIS — I10 ESSENTIAL HYPERTENSION: ICD-10-CM

## 2024-12-02 DIAGNOSIS — E78.00 HYPERCHOLESTEREMIA: Primary | ICD-10-CM

## 2024-12-02 DIAGNOSIS — E78.00 HYPERCHOLESTEREMIA: ICD-10-CM

## 2024-12-02 DIAGNOSIS — I71.40 ABDOMINAL AORTIC ANEURYSM (AAA) WITHOUT RUPTURE, UNSPECIFIED PART (HCC): ICD-10-CM

## 2024-12-02 LAB
ALBUMIN SERPL-MCNC: 4.4 G/DL (ref 3.4–5)
ALBUMIN/GLOB SERPL: 1.9 {RATIO} (ref 1.1–2.2)
ALP SERPL-CCNC: 85 U/L (ref 40–129)
ALT SERPL-CCNC: 32 U/L (ref 10–40)
ANION GAP SERPL CALCULATED.3IONS-SCNC: 11 MMOL/L (ref 3–16)
AST SERPL-CCNC: 31 U/L (ref 15–37)
BASOPHILS # BLD: 0 K/UL (ref 0–0.2)
BASOPHILS NFR BLD: 0.9 %
BILIRUB SERPL-MCNC: 0.7 MG/DL (ref 0–1)
BUN SERPL-MCNC: 18 MG/DL (ref 7–20)
CALCIUM SERPL-MCNC: 10.5 MG/DL (ref 8.3–10.6)
CHLORIDE SERPL-SCNC: 103 MMOL/L (ref 99–110)
CO2 SERPL-SCNC: 27 MMOL/L (ref 21–32)
CREAT SERPL-MCNC: 0.8 MG/DL (ref 0.8–1.3)
DEPRECATED RDW RBC AUTO: 14.2 % (ref 12.4–15.4)
EOSINOPHIL # BLD: 0.2 K/UL (ref 0–0.6)
EOSINOPHIL NFR BLD: 3.9 %
EST. AVERAGE GLUCOSE BLD GHB EST-MCNC: 111.2 MG/DL
GFR SERPLBLD CREATININE-BSD FMLA CKD-EPI: >90 ML/MIN/{1.73_M2}
GLUCOSE SERPL-MCNC: 93 MG/DL (ref 70–99)
HBA1C MFR BLD: 5.5 %
HCT VFR BLD AUTO: 46.2 % (ref 40.5–52.5)
HGB BLD-MCNC: 15.9 G/DL (ref 13.5–17.5)
LYMPHOCYTES # BLD: 1.6 K/UL (ref 1–5.1)
LYMPHOCYTES NFR BLD: 35.6 %
MCH RBC QN AUTO: 36 PG (ref 26–34)
MCHC RBC AUTO-ENTMCNC: 34.3 G/DL (ref 31–36)
MCV RBC AUTO: 104.9 FL (ref 80–100)
MONOCYTES # BLD: 0.6 K/UL (ref 0–1.3)
MONOCYTES NFR BLD: 14.1 %
NEUTROPHILS # BLD: 2 K/UL (ref 1.7–7.7)
NEUTROPHILS NFR BLD: 45.5 %
PLATELET # BLD AUTO: 206 K/UL (ref 135–450)
PMV BLD AUTO: 10.1 FL (ref 5–10.5)
POTASSIUM SERPL-SCNC: 5.8 MMOL/L (ref 3.5–5.1)
PROT SERPL-MCNC: 6.7 G/DL (ref 6.4–8.2)
RBC # BLD AUTO: 4.41 M/UL (ref 4.2–5.9)
SODIUM SERPL-SCNC: 141 MMOL/L (ref 136–145)
WBC # BLD AUTO: 4.5 K/UL (ref 4–11)

## 2024-12-02 ASSESSMENT — ENCOUNTER SYMPTOMS
CONSTIPATION: 0
NAUSEA: 0
COUGH: 0
DIARRHEA: 0
CHEST TIGHTNESS: 0
SHORTNESS OF BREATH: 0
VOMITING: 0

## 2024-12-02 NOTE — PROGRESS NOTES
12/2/2024    This is a 78 y.o. male   Chief Complaint   Patient presents with    Hypertension     BP is good when he checks.   .    HPI  Aureliano Campbell is a 78 y.o. male here to f/u on HTN, HLD, prediabetes, aaa, gerd, chuckie.    Chronic GERD- stable on daily PPI. Sometimes he will need to take BID. Has not been able to wean off in the past due to recurrent symptoms.     AAA--ultrasound done in Dec 2023 --stable --no sx    CHUCKIE--daily flonase and zyrtec-d     Glaucoma--seeing eye dr for this--borderline pressures per pt  Dx with macular degeneration and now receiving eye injection monthly. Reports that eye doctor is optimistic.     [place] polyps--done 3/16, 10/21- recall 5 years     ED--stable on cialis prn--gets from urology    Prostate ca-s/p prostatectomy-seeing dr Pritchard--stable recently per pt. Last PSA level was normal in Nov 2024    Hypertension:  Home blood pressure monitoring: No.  He is adherent to a low sodium diet. Patient denies chest pain, shortness of breath, peripheral edema, palpitations and dry cough.  Antihypertensive medication side effects: no medication side effects noted.  Had dry cough on lisinopril. Cough resolved on losartan.                                       Hyperlipidemia:  No new myalgias or GI upset on atorvastatin (Lipitor).       Prediabetes: diet- none; exercise- tries to stay active; walking couple of times per week. Going to the [facility] couple of times per week.     Lab Results   Component Value Date    LABA1C 5.5 12/04/2023    LABA1C 5.5 12/12/2022    LABA1C 5.6 03/29/2022     Lab Results   Component Value Date    CREATININE 0.7 (L) 05/09/2024     Lab Results   Component Value Date    ALT 22 05/09/2024    AST 25 05/09/2024     Lab Results   Component Value Date    CHOL 214 (H) 06/30/2023    TRIG 122 06/30/2023    HDL 66 (H) 07/12/2024           Patient Active Problem List   Diagnosis    History of adenocarcinoma of prostate    Blindness of left eye    AAA (abdominal aortic

## 2024-12-03 DIAGNOSIS — E87.5 HYPERKALEMIA: Primary | ICD-10-CM

## 2024-12-03 DIAGNOSIS — E87.5 HYPERKALEMIA: ICD-10-CM

## 2024-12-04 DIAGNOSIS — E87.5 HYPERKALEMIA: Primary | ICD-10-CM

## 2024-12-04 LAB — POTASSIUM SERPL-SCNC: 5.3 MMOL/L (ref 3.5–5.1)

## 2024-12-09 ENCOUNTER — HOSPITAL ENCOUNTER (OUTPATIENT)
Dept: ULTRASOUND IMAGING | Age: 79
Discharge: HOME | End: 2024-12-09
Payer: MEDICARE

## 2024-12-09 ENCOUNTER — OFFICE VISIT (OUTPATIENT)
Dept: VASCULAR SURGERY | Age: 79
End: 2024-12-09
Payer: MEDICARE

## 2024-12-09 VITALS
SYSTOLIC BLOOD PRESSURE: 130 MMHG | WEIGHT: 216 LBS | DIASTOLIC BLOOD PRESSURE: 80 MMHG | BODY MASS INDEX: 29.26 KG/M2 | HEIGHT: 72 IN

## 2024-12-09 DIAGNOSIS — I71.43 INFRARENAL ABDOMINAL AORTIC ANEURYSM (AAA) WITHOUT RUPTURE (HCC): Primary | ICD-10-CM

## 2024-12-09 DIAGNOSIS — I71.40 ABDOMINAL AORTIC ANEURYSM (AAA) WITHOUT RUPTURE, UNSPECIFIED PART (HCC): ICD-10-CM

## 2024-12-09 PROCEDURE — 99214 OFFICE O/P EST MOD 30 MIN: CPT | Performed by: SURGERY

## 2024-12-09 PROCEDURE — 1159F MED LIST DOCD IN RCRD: CPT | Performed by: SURGERY

## 2024-12-09 PROCEDURE — 3079F DIAST BP 80-89 MM HG: CPT | Performed by: SURGERY

## 2024-12-09 PROCEDURE — 3075F SYST BP GE 130 - 139MM HG: CPT | Performed by: SURGERY

## 2024-12-09 PROCEDURE — 1123F ACP DISCUSS/DSCN MKR DOCD: CPT | Performed by: SURGERY

## 2024-12-09 PROCEDURE — 76775 US EXAM ABDO BACK WALL LIM: CPT

## 2024-12-09 NOTE — PROGRESS NOTES
TAKE 1 TABLET BY MOUTH EVERY DAY 90 tablet 1    amLODIPine (NORVASC) 5 MG tablet TAKE 1 TABLET BY MOUTH EVERY DAY 90 tablet 1    Aflibercept (EYLEA HD IZ) 1 Dose by Intravitreal route every 30 days      amoxicillin (AMOXIL) 500 MG capsule TAKE 4 CAPSULES BY MOUTH ONE HOUR BEFORE YOUR APPOINTMENT      cetirizine-psuedoephedrine (ZYRTEC-D) 5-120 MG per extended release tablet Take 1 tablet by mouth 2 times daily      polyethylene glycol (GLYCOLAX) powder Take 17 g by mouth daily      tadalafil (CIALIS) 20 MG tablet Take 1 tablet by mouth as needed for Erectile Dysfunction      hydrocortisone 1 % cream Apply topically as needed Apply topically 2 times daily.      fluticasone (FLONASE) 50 MCG/ACT nasal spray 1 spray by Nasal route daily. 3 Bottle 3    aspirin EC 81 MG EC tablet Take 1 tablet by mouth daily      brimonidine-timolol (COMBIGAN) 0.2-0.5 % ophthalmic solution Place 1 drop into the right eye in the morning and 1 drop in the evening.  .       No current facility-administered medications for this visit.       Allergies  Allergies   Allergen Reactions    Lisinopril Other (See Comments)     cough       Social History  Social History     Socioeconomic History    Marital status:      Spouse name: Sue     Number of children: 4    Years of education: None    Highest education level: None   Occupational History    Occupation: Retired-    Tobacco Use    Smoking status: Former     Current packs/day: 0.00     Average packs/day: 0.5 packs/day for 3.0 years (1.5 ttl pk-yrs)     Types: Cigarettes     Start date: 1967     Quit date: 1970     Years since quittin.9    Smokeless tobacco: Never    Tobacco comments:     counseled on tobacco exposure avoidance   Vaping Use    Vaping status: Never Used   Substance and Sexual Activity    Alcohol use: Yes     Alcohol/week: 0.0 standard drinks of alcohol     Comment: 2-3 drinks 5 days per week beer or gin    Drug use: No    Sexual activity:

## 2024-12-18 DIAGNOSIS — E87.5 HYPERKALEMIA: ICD-10-CM

## 2024-12-18 LAB
ANION GAP SERPL CALCULATED.3IONS-SCNC: 10 MMOL/L (ref 3–16)
BUN SERPL-MCNC: 14 MG/DL (ref 7–20)
CALCIUM SERPL-MCNC: 10.1 MG/DL (ref 8.3–10.6)
CHLORIDE SERPL-SCNC: 98 MMOL/L (ref 99–110)
CO2 SERPL-SCNC: 28 MMOL/L (ref 21–32)
CREAT SERPL-MCNC: 0.8 MG/DL (ref 0.8–1.3)
GFR SERPLBLD CREATININE-BSD FMLA CKD-EPI: >90 ML/MIN/{1.73_M2}
GLUCOSE SERPL-MCNC: 82 MG/DL (ref 70–99)
POTASSIUM SERPL-SCNC: 5.5 MMOL/L (ref 3.5–5.1)
SODIUM SERPL-SCNC: 136 MMOL/L (ref 136–145)

## 2024-12-19 DIAGNOSIS — I10 ESSENTIAL HYPERTENSION: ICD-10-CM

## 2024-12-19 DIAGNOSIS — E87.5 HYPERKALEMIA: Primary | ICD-10-CM

## 2024-12-19 RX ORDER — LOSARTAN POTASSIUM 100 MG/1
TABLET ORAL
Qty: 90 TABLET | Refills: 1
Start: 2024-12-19 | End: 2024-12-20 | Stop reason: DRUGHIGH

## 2024-12-26 ENCOUNTER — PATIENT MESSAGE (OUTPATIENT)
Dept: FAMILY MEDICINE CLINIC | Age: 79
End: 2024-12-26

## 2024-12-26 DIAGNOSIS — I10 ESSENTIAL HYPERTENSION: ICD-10-CM

## 2024-12-26 RX ORDER — AMLODIPINE BESYLATE 10 MG/1
TABLET ORAL
Qty: 90 TABLET | Refills: 0 | Status: SHIPPED | OUTPATIENT
Start: 2024-12-26

## 2024-12-26 NOTE — TELEPHONE ENCOUNTER
Hi Stephen! Alayna asked me to check back in if my blood pressure was above 140. It is running about 159 over 103 today. Alayna will likely want to make an adjustment. Herb     Patient is not having any dizziness but a very mild headache.  His Losartan was decreased because of consistently high potassium readings. Her plan according to lab note was to increase amlodipine dose if necessary. Any changes??

## 2024-12-26 NOTE — TELEPHONE ENCOUNTER
Spoke to patient and gave instructions.  He will monitor bp and message with readings.  Also will message if any side effects.  He leaves for [place] on Saturday.

## 2024-12-26 NOTE — TELEPHONE ENCOUNTER
Higher dose of amlodipine sent.  Please let him know that a common side effect can be mild swelling in the lower legs especially at the end of the day.  Okay to monitor blood pressure on this new dose and let us know how things are going over the next week or so.

## 2025-01-06 LAB
BUN BLDV-MCNC: 13 MG/DL
CALCIUM SERPL-MCNC: 10 MG/DL
CHLORIDE BLD-SCNC: 103 MMOL/L
CO2: 29 MMOL/L
CREAT SERPL-MCNC: 0.83 MG/DL
EGFR: 90
GLUCOSE BLD-MCNC: 97 MG/DL
POTASSIUM SERPL-SCNC: 5.3 MMOL/L
SODIUM BLD-SCNC: 139 MMOL/L

## 2025-03-13 DIAGNOSIS — I10 ESSENTIAL HYPERTENSION: ICD-10-CM

## 2025-03-13 LAB
ANION GAP SERPL CALCULATED.3IONS-SCNC: 10 MMOL/L (ref 3–16)
BUN SERPL-MCNC: 12 MG/DL (ref 7–20)
CALCIUM SERPL-MCNC: 9.4 MG/DL (ref 8.3–10.6)
CHLORIDE SERPL-SCNC: 100 MMOL/L (ref 99–110)
CO2 SERPL-SCNC: 27 MMOL/L (ref 21–32)
CREAT SERPL-MCNC: 0.7 MG/DL (ref 0.8–1.3)
GFR SERPLBLD CREATININE-BSD FMLA CKD-EPI: >90 ML/MIN/{1.73_M2}
GLUCOSE SERPL-MCNC: 103 MG/DL (ref 70–99)
POTASSIUM SERPL-SCNC: 5 MMOL/L (ref 3.5–5.1)
SODIUM SERPL-SCNC: 137 MMOL/L (ref 136–145)

## 2025-03-17 ENCOUNTER — RESULTS FOLLOW-UP (OUTPATIENT)
Dept: FAMILY MEDICINE CLINIC | Age: 80
End: 2025-03-17

## 2025-03-18 DIAGNOSIS — K21.9 GASTROESOPHAGEAL REFLUX DISEASE, UNSPECIFIED WHETHER ESOPHAGITIS PRESENT: ICD-10-CM

## 2025-03-18 RX ORDER — ESOMEPRAZOLE MAGNESIUM 40 MG/1
40 CAPSULE, DELAYED RELEASE ORAL
Qty: 90 CAPSULE | Refills: 1 | Status: SHIPPED | OUTPATIENT
Start: 2025-03-18

## 2025-03-18 RX ORDER — ATORVASTATIN CALCIUM 80 MG/1
80 TABLET, FILM COATED ORAL DAILY
Qty: 90 TABLET | Refills: 1 | Status: SHIPPED | OUTPATIENT
Start: 2025-03-18

## 2025-03-19 DIAGNOSIS — I10 ESSENTIAL HYPERTENSION: ICD-10-CM

## 2025-03-19 RX ORDER — LOSARTAN POTASSIUM 50 MG/1
50 TABLET ORAL DAILY
Qty: 90 TABLET | Refills: 1 | Status: SHIPPED | OUTPATIENT
Start: 2025-03-19

## 2025-03-20 RX ORDER — AMLODIPINE BESYLATE 10 MG/1
10 TABLET ORAL DAILY
Qty: 90 TABLET | Refills: 1 | Status: SHIPPED | OUTPATIENT
Start: 2025-03-20

## 2025-06-03 ENCOUNTER — OFFICE VISIT (OUTPATIENT)
Dept: FAMILY MEDICINE CLINIC | Age: 80
End: 2025-06-03

## 2025-06-03 VITALS
TEMPERATURE: 98.1 F | RESPIRATION RATE: 18 BRPM | DIASTOLIC BLOOD PRESSURE: 72 MMHG | SYSTOLIC BLOOD PRESSURE: 118 MMHG | HEART RATE: 72 BPM | BODY MASS INDEX: 29.36 KG/M2 | OXYGEN SATURATION: 94 % | HEIGHT: 72 IN | WEIGHT: 216.8 LBS

## 2025-06-03 DIAGNOSIS — E78.00 HYPERCHOLESTEREMIA: ICD-10-CM

## 2025-06-03 DIAGNOSIS — I10 ESSENTIAL HYPERTENSION: ICD-10-CM

## 2025-06-03 DIAGNOSIS — H35.30 AMD (AGE RELATED MACULAR DEGENERATION): ICD-10-CM

## 2025-06-03 DIAGNOSIS — D75.89 MACROCYTOSIS WITHOUT ANEMIA: ICD-10-CM

## 2025-06-03 DIAGNOSIS — K21.9 GASTROESOPHAGEAL REFLUX DISEASE, UNSPECIFIED WHETHER ESOPHAGITIS PRESENT: ICD-10-CM

## 2025-06-03 DIAGNOSIS — I71.40 ABDOMINAL AORTIC ANEURYSM (AAA) WITHOUT RUPTURE, UNSPECIFIED PART: ICD-10-CM

## 2025-06-03 DIAGNOSIS — R73.03 PREDIABETES: ICD-10-CM

## 2025-06-03 DIAGNOSIS — Z00.00 MEDICARE ANNUAL WELLNESS VISIT, SUBSEQUENT: Primary | ICD-10-CM

## 2025-06-03 SDOH — ECONOMIC STABILITY: FOOD INSECURITY: WITHIN THE PAST 12 MONTHS, YOU WORRIED THAT YOUR FOOD WOULD RUN OUT BEFORE YOU GOT MONEY TO BUY MORE.: NEVER TRUE

## 2025-06-03 SDOH — ECONOMIC STABILITY: FOOD INSECURITY: WITHIN THE PAST 12 MONTHS, THE FOOD YOU BOUGHT JUST DIDN'T LAST AND YOU DIDN'T HAVE MONEY TO GET MORE.: NEVER TRUE

## 2025-06-03 ASSESSMENT — PATIENT HEALTH QUESTIONNAIRE - PHQ9
SUM OF ALL RESPONSES TO PHQ QUESTIONS 1-9: 0
1. LITTLE INTEREST OR PLEASURE IN DOING THINGS: NOT AT ALL
SUM OF ALL RESPONSES TO PHQ QUESTIONS 1-9: 0
2. FEELING DOWN, DEPRESSED OR HOPELESS: NOT AT ALL

## 2025-06-03 ASSESSMENT — LIFESTYLE VARIABLES
HOW OFTEN DURING THE LAST YEAR HAVE YOU FOUND THAT YOU WERE NOT ABLE TO STOP DRINKING ONCE YOU HAD STARTED: NEVER
HAS A RELATIVE, FRIEND, DOCTOR, OR ANOTHER HEALTH PROFESSIONAL EXPRESSED CONCERN ABOUT YOUR DRINKING OR SUGGESTED YOU CUT DOWN: NO
HAVE YOU OR SOMEONE ELSE BEEN INJURED AS A RESULT OF YOUR DRINKING: NO
HOW OFTEN DURING THE LAST YEAR HAVE YOU NEEDED AN ALCOHOLIC DRINK FIRST THING IN THE MORNING TO GET YOURSELF GOING AFTER A NIGHT OF HEAVY DRINKING: NEVER
HOW OFTEN DO YOU HAVE A DRINK CONTAINING ALCOHOL: 4 OR MORE TIMES A WEEK
HOW MANY STANDARD DRINKS CONTAINING ALCOHOL DO YOU HAVE ON A TYPICAL DAY: 1 OR 2
HOW OFTEN DURING THE LAST YEAR HAVE YOU FAILED TO DO WHAT WAS NORMALLY EXPECTED FROM YOU BECAUSE OF DRINKING: NEVER
HOW OFTEN DURING THE LAST YEAR HAVE YOU BEEN UNABLE TO REMEMBER WHAT HAPPENED THE NIGHT BEFORE BECAUSE YOU HAD BEEN DRINKING: NEVER
HOW OFTEN DURING THE LAST YEAR HAVE YOU HAD A FEELING OF GUILT OR REMORSE AFTER DRINKING: NEVER

## 2025-06-03 NOTE — PROGRESS NOTES
Medicare Annual Wellness Visit    Aureliano Campbell is here for Medicare AWV (Has had intermittent dizziness. )    Assessment & Plan   Medicare annual wellness visit, subsequent  -     TSH reflex to FT4; Future  Healthy lifestyles reviewed: Diet, aerobic exercise, sunscreen, vision and dental exams.    Hypercholesteremia  -     Comprehensive Metabolic Panel; Future  -     Lipid, Fasting; Future  On atorvastatin 80 mg daily.  Diet and weight loss discussed and needed.    Prediabetes  -     Comprehensive Metabolic Panel; Future  Low-carb diet, aerobic exercise, and weight loss discussed in needed.    Gastroesophageal reflux disease, unspecified whether esophagitis present  Stable on Nexium 40 mg daily. He has not been able to wean off medication in the past due to recurrent symptoms.   Advised to avoid foods that cause reflux.     Abdominal aortic aneurysm (AAA) without rupture, unspecified part  Asymptomatic. Rechecked ultrasound Dec 2024- scheduled for repeat in 12/2025. Continue f/u with vascular Dr. Wall.      Essential hypertension  -     Comprehensive Metabolic Panel; Future  Controlled. On losartan 50 mg daily and amlodipine 10 mg daily.   Low-salt diet, aerobic exercise, and weight loss discussed in needed.    Macrocytosis without anemia  -     CBC with Auto Differential; Future  -     Vitamin B12 & Folate; Future  Repeat labs.   Advised to drink alcohol in moderation     Chronic non-seasonal allergic rhinitis, unspecified trigger  Stable. Continue flonase and zyrtec.     Macular degeneration- stable per patient.  Receiving eye injection every 3 months. Continue to follow-up with eye specialist Dr. Melendez     Return in about 6 months (around 12/3/2025), or if symptoms worsen or fail to improve, for chronic conditions.     Subjective   The following acute and/or chronic problems were also addressed today:    Chronic GERD- stable on daily PPI. Sometimes he will need to take BID. Has not been able to wean

## 2025-06-05 DIAGNOSIS — I10 ESSENTIAL HYPERTENSION: ICD-10-CM

## 2025-06-05 DIAGNOSIS — R73.03 PREDIABETES: ICD-10-CM

## 2025-06-05 DIAGNOSIS — Z00.00 MEDICARE ANNUAL WELLNESS VISIT, SUBSEQUENT: ICD-10-CM

## 2025-06-05 DIAGNOSIS — E78.00 HYPERCHOLESTEREMIA: ICD-10-CM

## 2025-06-05 DIAGNOSIS — D75.89 MACROCYTOSIS WITHOUT ANEMIA: ICD-10-CM

## 2025-06-05 LAB
ALBUMIN SERPL-MCNC: 4.1 G/DL (ref 3.4–5)
ALBUMIN/GLOB SERPL: 1.8 {RATIO} (ref 1.1–2.2)
ALP SERPL-CCNC: 72 U/L (ref 40–129)
ALT SERPL-CCNC: 24 U/L (ref 10–40)
ANION GAP SERPL CALCULATED.3IONS-SCNC: 11 MMOL/L (ref 3–16)
AST SERPL-CCNC: 27 U/L (ref 15–37)
BASOPHILS # BLD: 0 K/UL (ref 0–0.2)
BASOPHILS NFR BLD: 0.8 %
BILIRUB SERPL-MCNC: 0.4 MG/DL (ref 0–1)
BUN SERPL-MCNC: 9 MG/DL (ref 7–20)
CALCIUM SERPL-MCNC: 9.2 MG/DL (ref 8.3–10.6)
CHLORIDE SERPL-SCNC: 103 MMOL/L (ref 99–110)
CHOLEST SERPL-MCNC: 171 MG/DL (ref 0–199)
CO2 SERPL-SCNC: 25 MMOL/L (ref 21–32)
CREAT SERPL-MCNC: 0.7 MG/DL (ref 0.8–1.3)
DEPRECATED RDW RBC AUTO: 13.6 % (ref 12.4–15.4)
EOSINOPHIL # BLD: 0.2 K/UL (ref 0–0.6)
EOSINOPHIL NFR BLD: 5 %
FOLATE SERPL-MCNC: 18.8 NG/ML (ref 4.78–24.2)
GFR SERPLBLD CREATININE-BSD FMLA CKD-EPI: >90 ML/MIN/{1.73_M2}
GLUCOSE SERPL-MCNC: 96 MG/DL (ref 70–99)
HCT VFR BLD AUTO: 43.1 % (ref 40.5–52.5)
HDLC SERPL-MCNC: 59 MG/DL (ref 40–60)
HGB BLD-MCNC: 14.6 G/DL (ref 13.5–17.5)
LDL CHOLESTEROL: 98 MG/DL
LYMPHOCYTES # BLD: 1.6 K/UL (ref 1–5.1)
LYMPHOCYTES NFR BLD: 31.3 %
MCH RBC QN AUTO: 34.6 PG (ref 26–34)
MCHC RBC AUTO-ENTMCNC: 33.9 G/DL (ref 31–36)
MCV RBC AUTO: 102.1 FL (ref 80–100)
MONOCYTES # BLD: 0.5 K/UL (ref 0–1.3)
MONOCYTES NFR BLD: 9.9 %
NEUTROPHILS # BLD: 2.6 K/UL (ref 1.7–7.7)
NEUTROPHILS NFR BLD: 53 %
PLATELET # BLD AUTO: 225 K/UL (ref 135–450)
PMV BLD AUTO: 9.9 FL (ref 5–10.5)
POTASSIUM SERPL-SCNC: 4.2 MMOL/L (ref 3.5–5.1)
PROT SERPL-MCNC: 6.4 G/DL (ref 6.4–8.2)
RBC # BLD AUTO: 4.22 M/UL (ref 4.2–5.9)
SODIUM SERPL-SCNC: 139 MMOL/L (ref 136–145)
TRIGL SERPL-MCNC: 71 MG/DL (ref 0–150)
TSH SERPL DL<=0.005 MIU/L-ACNC: 1.42 UIU/ML (ref 0.27–4.2)
VIT B12 SERPL-MCNC: 411 PG/ML (ref 211–911)
VLDLC SERPL CALC-MCNC: 14 MG/DL
WBC # BLD AUTO: 5 K/UL (ref 4–11)

## 2025-06-06 ENCOUNTER — RESULTS FOLLOW-UP (OUTPATIENT)
Dept: FAMILY MEDICINE CLINIC | Age: 80
End: 2025-06-06

## 2025-08-08 ENCOUNTER — TELEPHONE (OUTPATIENT)
Dept: VASCULAR SURGERY | Age: 80
End: 2025-08-08

## 2025-08-11 DIAGNOSIS — I71.43 INFRARENAL ABDOMINAL AORTIC ANEURYSM (AAA) WITHOUT RUPTURE: Primary | ICD-10-CM

## (undated) DEVICE — FORCEPS BX 240CM 2.4MM L NDL RAD JAW 4 M00513334